# Patient Record
Sex: FEMALE | Race: WHITE | NOT HISPANIC OR LATINO | Employment: PART TIME | URBAN - METROPOLITAN AREA
[De-identification: names, ages, dates, MRNs, and addresses within clinical notes are randomized per-mention and may not be internally consistent; named-entity substitution may affect disease eponyms.]

---

## 2017-08-31 ENCOUNTER — HOSPITAL ENCOUNTER (OUTPATIENT)
Dept: RADIOLOGY | Facility: MEDICAL CENTER | Age: 32
End: 2017-08-31
Attending: FAMILY MEDICINE
Payer: MEDICAID

## 2017-08-31 DIAGNOSIS — M54.5 LOW BACK PAIN, UNSPECIFIED BACK PAIN LATERALITY, UNSPECIFIED CHRONICITY, WITH SCIATICA PRESENCE UNSPECIFIED: ICD-10-CM

## 2017-08-31 PROCEDURE — 72148 MRI LUMBAR SPINE W/O DYE: CPT

## 2017-10-26 ENCOUNTER — NON-PROVIDER VISIT (OUTPATIENT)
Dept: NEUROLOGY | Facility: MEDICAL CENTER | Age: 32
End: 2017-10-26
Payer: MEDICAID

## 2017-10-26 DIAGNOSIS — R20.2 PARESTHESIAS: ICD-10-CM

## 2017-10-26 PROCEDURE — 95909 NRV CNDJ TST 5-6 STUDIES: CPT | Performed by: PSYCHIATRY & NEUROLOGY

## 2017-10-26 PROCEDURE — 95886 MUSC TEST DONE W/N TEST COMP: CPT | Performed by: PSYCHIATRY & NEUROLOGY

## 2017-10-26 NOTE — PROGRESS NOTES
[facility]  Neurodiagnostic Laboratory  [address], [address]  [place] [address]  Tel: [phone]  Fax: [phone]  Test Date:  10/26/2017    Patient: ALISHA BRIZUELA : 1985 Physician: NICOLÁS   Sex: Female Height:  cm Ref Phys: MAXIMILIANO   ID#: [id number] Weight:  lbs. Technician:    CSN#: [id number]         CSN  [id number]        ___________________________  NICOLÁS        Nerve Conduction Studies  Anti Sensory Summary Table     Stim Site NR Peak (ms) Norm Peak (ms) P-T Amp (µV) Norm O-P Amp Site1 Site2 Delta-P (ms) Dist (cm) Yandel (m/s) Norm Yandel (m/s)   Right Sural Anti Sensory (Lat Mall)   Calf    4.0   >5 Calf Lat Mall 4.0 20.0 50 >40     Motor Summary Table     Stim Site NR Onset (ms) Norm Onset (ms) O-P Amp (mV) Norm O-P Amp Site1 Site2 Delta-0 (ms) Dist (cm) Yandel (m/s) Norm Yandel (m/s)   Right Peroneal EDB Motor (Ext Dig Brev)   Ankle    3.3 <5.5 5.5 >3.0 B Fib Ankle 8.3 39.0 47 >40   B Fib    11.6  4.0          Right Tibial Motor (Abd Mccormick Brev)   Ankle    3.2 <6 10.6 >8 Knee Ankle 9.8 40.0 41 >40   Knee    13.0  8.2            EMG+     Side Muscle Nerve Root Ins Act Fibs Psw Amp Dur Poly Recrt Int Pat Comment   Right VastusLat Femoral L2-4 Nml Nml Nml Nml Nml 0 Nml Nml    Right AntTibialis Dp Br Fibular L4-5 Nml Nml Nml Nml Nml 0 Nml Nml    Right Gastroc Tibial S1-2 Nml Nml Nml Nml Nml 0 Nml Nml    Right Ext Dig Brev Dp Br Fibular L5, S1 Nml Nml Nml Nml Nml 0 Nml Nml    Right AbdHallucis MedPlantar S1-2 Nml Nml Nml Nml Nml 0 Nml Nml        Nerve Conduction Studies  Anti Sensory Left/Right Comparison     Stim Site L Lat (ms) R Lat (ms) L-R Lat (ms) L Amp (µV) R Amp (µV) L-R Amp (%) Site1 Site2 L Yandel (m/s) R Yandel (m/s) L-R Yandel (m/s)   Sural Anti Sensory (Lat Mall)   Calf  4.0   14.1  Calf Lat Mall  50      Motor Left/Right Comparison     Stim Site L Lat (ms) R Lat (ms) L-R Lat (ms) L Amp (mV) R Amp (mV) L-R Amp (%) Site1 Site2 L Yandel (m/s) R Yandel (m/s) L-R Yandel (m/s)   Peroneal EDB  Motor (Ext Dig Brev)   Ankle  3.3   5.5  B Fib Ankle  47    B Fib  11.6   4.0         Tibial Motor (Abd Mccormick Brev)   Ankle  3.2   10.6  Knee Ankle  41    Knee  13.0   8.2               Waveforms:           DESCRIPTION:    Normal NCV EMG    SUMMARY:  ________________________________________________________________________      Normal NCV EMG    ________________________________________________________________________      However, recovering right peroneal nerve entrapment could have normal NCV EMG

## 2017-10-27 NOTE — PROCEDURES
DATE OF SERVICE:  10/26/2017    This is an outpatient NCV/EMG.    [facility]  Neurodiagnostic Laboratory  [address], [address]  [place] [address]  Tel: [phone]  Fax: [phone]  Test Date:  10/26/2017     Patient: ALISHA BRIZUELA : 1985 Physician: NICOLÁS   Sex: Female Height:  cm Ref Phys: MAXIMILIANO   ID#: [id number] Weight:  lbs. 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             ___________________________  NICOLÁS          Nerve Conduction Studies  Anti Sensory Summary Table      Stim Site NR Peak (ms) Norm Peak (ms) P-T Amp (µV) Norm O-P Amp Site1 Site2 Delta-P (ms) Dist (cm) Yandel (m/s) Norm Yandel (m/s)   Right Sural Anti Sensory (Lat Mall)   Calf    4.0     >5 Calf Lat Mall 4.0 20.0 50 >40      Motor Summary Table      Stim Site NR Onset (ms) Norm Onset (ms) O-P Amp (mV) Norm O-P Amp Site1 Site2 Delta-0 (ms) Dist (cm) Yandel (m/s) Norm Yandel (m/s)   Right Peroneal EDB Motor (Ext Dig Brev)   Ankle    3.3 <5.5 5.5 >3.0 B Fib Ankle 8.3 39.0 47 >40   B Fib    11.6   4.0                 Right Tibial Motor (Abd Mccormick Brev)   Ankle    3.2 <6 10.6 >8 Knee Ankle 9.8 40.0 41 >40   Knee    13.0   8.2                    EMG+      Side Muscle Nerve Root Ins Act Fibs Psw Amp Dur Poly Recrt Int Pat Comment   Right VastusLat Femoral L2-4 Nml Nml Nml Nml Nml 0 Nml Nml     Right AntTibialis Dp Br Fibular L4-5 Nml Nml Nml Nml Nml 0 Nml Nml     Right Gastroc Tibial S1-2 Nml Nml Nml Nml Nml 0 Nml Nml     Right Ext Dig Brev Dp Br Fibular L5, S1 Nml Nml Nml Nml Nml 0 Nml Nml     Right AbdHallucis MedPlantar S1-2 Nml Nml Nml Nml Nml 0 Nml Nml           Nerve Conduction Studies  Anti Sensory Left/Right Comparison      Stim Site L Lat (ms) R Lat (ms) L-R Lat (ms) L Amp (µV) R Amp (µV) L-R Amp (%) Site1 Site2 L Yandel (m/s) R Yandel (m/s) L-R Yandel (m/s)   Sural Anti Sensory (Lat Mall)   Calf   4.0     14.1   Calf Lat Mall   50         Motor Left/Right Comparison      Stim Site L Lat (ms) R Lat (ms) L-R Lat (ms) L Amp (mV) R Amp (mV) L-R Amp (%) Site1 Site2 L Yandel (m/s) R Yandel (m/s) L-R Yandel (m/s)   Peroneal EDB Motor (Ext Dig Brev)   Ankle   3.3     5.5   B Fib Ankle   47     B Fib   11.6     4.0               Tibial Motor (Abd Mccormick Brev)   Ankle   3.2     10.6   Knee Ankle   41     Knee   13.0     8.2                       Waveforms:             DESCRIPTION:     Normal NCV EMG     SUMMARY:  ________________________________________________________________________        Normal NCV EMG     ________________________________________________________________________        However, recovering right peroneal nerve entrapment could have normal NCV EMG    Nerve conduction studies and EMG studies are useful supportive diagnostic tool but not confirmatory tests in majority of cases. Clinical Correlation is advised       ____________________________________     MD TOMY COREA / KISHA    DD:  10/26/2017 16:10:19  DT:  10/26/2017 16:22:05    D#:  [id number]  Job#:  [id number]    cc: Delmy Tiwari MD

## 2019-02-27 ENCOUNTER — HOSPITAL ENCOUNTER (OUTPATIENT)
Dept: RADIOLOGY | Facility: MEDICAL CENTER | Age: 34
End: 2019-02-27
Attending: FAMILY MEDICINE
Payer: COMMERCIAL

## 2019-02-27 DIAGNOSIS — M25.561 ACUTE PAIN OF RIGHT KNEE: ICD-10-CM

## 2019-02-27 PROCEDURE — 73721 MRI JNT OF LWR EXTRE W/O DYE: CPT | Mod: RT

## 2019-06-07 ENCOUNTER — PHYSICAL THERAPY (OUTPATIENT)
Dept: PHYSICAL THERAPY | Facility: REHABILITATION | Age: 34
End: 2019-06-07
Attending: ORTHOPAEDIC SURGERY
Payer: COMMERCIAL

## 2019-06-07 DIAGNOSIS — M25.561 ACUTE PAIN OF RIGHT KNEE: ICD-10-CM

## 2019-06-07 DIAGNOSIS — M22.41 CHONDROMALACIA OF PATELLOFEMORAL JOINT, RIGHT: ICD-10-CM

## 2019-06-07 PROCEDURE — 97110 THERAPEUTIC EXERCISES: CPT

## 2019-06-07 PROCEDURE — 97162 PT EVAL MOD COMPLEX 30 MIN: CPT

## 2019-06-07 ASSESSMENT — ENCOUNTER SYMPTOMS
EXACERBATED BY: ACTIVITY
ALLEVIATING FACTORS: PHARMACOTHERAPY
ALLEVIATING FACTORS: ACTIVITY MODIFICATION
QUALITY: TIGHT
QUALITY: DISCOMFORT

## 2019-06-07 NOTE — OP THERAPY EVALUATION
Outpatient Physical Therapy  INITIAL EVALUATION    [facility] Physical Therapy [address]  [address]  [place] [address]  Phone:  [phone]  Fax:  [phone]    Date of Evaluation: 06/07/2019    Patient: Vanessa Crenshaw  YOB: 1985  MRN: [id number]     Referring Provider: WALT [id number]  [address]  [place] [address]   Referring Diagnosis Pain in right knee [M25.561]     Time Calculation  Start time: 1330  Stop time: 1430 Time Calculation (min): 60 minutes     Physical Therapy Occurrence Codes    Date of onset of impairment:  2/15/19   Date physical therapy care plan established or reviewed:  6/7/19   Date physical therapy treatment started:  6/7/19          Chief Complaint: Knee Problem    Visit Diagnoses     ICD-10-CM   1. Chondromalacia of patellofemoral joint, right M22.41   2. Acute pain of right knee M25.561         Subjective:   History of Present Illness:     Date of onset:  2/15/2019    Mechanism of injury:  February, wore ski boots for a full day at work.  Was having sharp pains.  No longer having sharp pains, but feels very tight.    Was doing gym workouts 5x/week- jogging/running and weight machines.  Stopped for a few months.  Recently returned to gym 1x/week doing only walking and incline walking on TM without much symptoms.  Tried 2x this week, and caused discomfort and tightness the next day.  Feels tight.  Flat shoes cause distal medial and lateral thigh pain.  , standing 8 hour shifts.  Wears sneakers  Pain:     Quality:  Tight and discomfort    Pain timing: after activity.    Relieving factors:  Pharmacotherapy and activity modification (ibuprofen 600 mg prn (next morning after agg activity))    Aggravating factors:  Activity (walking downhill, standing from sitting)  Treatments:     None        History reviewed. No pertinent past medical history.  History reviewed. No pertinent surgical history.  Social History   Substance Use  "Topics   • Smoking status: Current Every Day Smoker     Packs/day: 3.00     Types: Cigarettes   • Smokeless tobacco: Not on file   • Alcohol use Yes     Family and Occupational History     Social History   • Marital status: Single     Spouse name: N/A   • Number of children: N/A   • Years of education: N/A       Objective     Tenderness     Right Knee   Tenderness in the inferior patella.     Active Range of Motion     Right Knee   Flexion: WFL  Extension: 0 degrees     Passive Range of Motion     Additional Passive Range of Motion Details  Supine passive hamstring stretch- L hip flexion to 75 deg, R hip flexion to 60 degrees.  Prone passive quad stretch knee flexion- equal R and L without symptoms.    Patellar Mobility   Left Knee Patellar tendons within functional limits include the superior and inferior. Hypermobile in the left medial and left lateral patellar tendon(s).     Right Knee Patellar tendons within functional limits include the lateral, superior and inferior. Hypermobile in the medial patellar tendon(s).     Strength:      Left Knee   Normal strength    Right Knee   Flexion: 4+  Extension: 5    Additional Strength Details  R SLR- fatigues with reps  MMTs:  R hip abd= 4+/5  R hip ext= 4+/5  R hip IR and ER = 4+/5      Tests     Right Knee   Positive patella-femoral grind.   Negative lateral Mitch, medial Mitch, patellar apprehension, patellar compression, valgus stress test at 0 degrees and varus stress test at 0 degrees.   Ambulation     Observational Gait   Gait: asymmetric     Quality of Movement During Gait     Knee    Knee (Right): Positive IR tibial torsion.     Functional Assessment   Squat   Sitting toward left side.     Forward Step Up 6\"     Right Leg  Pain and increased contralateral push off.     Forward Step Down 6\"     Right Leg  Pain and valgus.     Single Leg Stance   Left: 30 seconds  Right: 30 (increased knee and hip strategy with R SLS versus ankle strategy only with L SLS) " seconds        Therapeutic Exercises (CPT 78561):     1. Wall squats, emphasis on RLE alignment and equal weight bearing R and LLE.    2. SLR    3. Sidelying hip abd    4. Bridges    5. SL bridges    6. Active HS stretch      Therapeutic Exercise Summary: Provided handout with these exercises for daily HEP.      Time-based treatments/modalities:  Therapeutic exercise minutes (CPT 07136): 15 minutes       Assessment, Response and Plan:   Impairments: activity intolerance, impaired physical strength, lacks appropriate home exercise program, pain with function and weight-bearing intolerance    Assessment details:  33 y.o. female presents with R patellar chondromalacia.  Pt demonstrates decreased strength and weight acceptance on R LE.  Pt will benefit from skilled PT services for strengthening and HEP to improve patellar mobility and positioning in weight bearing activities.  Barriers to therapy:  None  Prognosis: good    Goals:   Short Term Goals:   1. Pt able to return to gym activity 3x/week with < 3/10 symptom intensity.  2. R passive hamstring flexibility equal to L.  3. Pt able to stand from sitting with < 3/10 symptom intensity.  4. Pt will be independent with daily HEP.  Short term goal time span:  2-4 weeks      Long Term Goals:    1. R hip MMTs= 5/5 throughout.  2. R step ups and downs performed without symptoms and with good stability and alignment.  3. Pt will report increased function via improved scores on WOMAC.  Long term goal time span:  6-8 weeks    Plan:   Therapy options:  Physical therapy treatment to continue  Planned therapy interventions:  E Stim Unattended (CPT 75348), Manual Therapy (CPT 76241), Neuromuscular Re-education (CPT 35769) and Therapeutic Exercise (CPT 09172)  Frequency:  2x week  Duration in visits:  8  Discussed with:  Patient  Plan details:  Expect decrease in frequency of visits as pt progresses.      Functional Limitations and Severity Modifiers  WOMAC Grand Total: 25                Referring provider co-signature:  I have reviewed this plan of care and my co-signature certifies the need for services.  Certification Dates:   From 6/7/19     To 8/1/19    Physician Signature: ________________________________ Date: ______________

## 2019-06-14 ENCOUNTER — PHYSICAL THERAPY (OUTPATIENT)
Dept: PHYSICAL THERAPY | Facility: REHABILITATION | Age: 34
End: 2019-06-14
Attending: ORTHOPAEDIC SURGERY
Payer: COMMERCIAL

## 2019-06-14 DIAGNOSIS — M25.561 ACUTE PAIN OF RIGHT KNEE: ICD-10-CM

## 2019-06-14 DIAGNOSIS — M22.41 CHONDROMALACIA OF PATELLOFEMORAL JOINT, RIGHT: ICD-10-CM

## 2019-06-14 PROCEDURE — 97110 THERAPEUTIC EXERCISES: CPT

## 2019-06-14 NOTE — OP THERAPY DAILY TREATMENT
"  Outpatient Physical Therapy  DAILY TREATMENT     [facility] Physical [address]  [address]  [place] [address]  Phone:  [phone]  Fax:  [phone]    Date: 06/14/2019    Patient: Vanessa Crenshaw  YOB: 1985  MRN: [id number]     Time Calculation  Start time: 0832  Stop time: 0857 Time Calculation (min): 25 minutes     Chief Complaint: Knee Problem    Visit #: 2    SUBJECTIVE:  I've been to the gym 2x since eval.  I'm not \"babying\" my knee, I did leg press and hip abd and add weight machines.  I took ibuprofen the following day.  I'm doing the HEP, but not sure if I'm doing a couple right, because I don't feel anything.    OBJECTIVE:          Therapeutic Exercises (CPT 19991):     1. TKE, x15B with medium resistance band    2. Active hamstring stretch at 90/90, x10B, reviewed and corrected for correct technique for HEP    3. SLR with ER, x15B    4. Prone hip ext, x15B    5. Shuttle leg press, 6 cords, x20    6. Long sitting HS stretch, x30 sec B    7. Sidelying hip abd, x10B, reviewed and corrected for correct technique for HEP    8. Standing ITB stretch, x30 sec B    9. Supine ITB stretch, x30 sec B    10. Figure four stretch pulling knee to chest, x30 sec B      Therapeutic Exercise Summary: Add seated HS stretch, ITB stretches, and figure four stretch to HEP.      Time-based treatments/modalities:  Therapeutic exercise minutes (CPT 15633): 25 minutes     ASSESSMENT:   Response to treatment: Pt cooperative and eager to continue progressing exercises independently.    PLAN/RECOMMENDATIONS:   Plan for treatment: therapy treatment to continue next visit.  Planned interventions for next visit: continue with current treatment.      "

## 2019-06-17 ENCOUNTER — PHYSICAL THERAPY (OUTPATIENT)
Dept: PHYSICAL THERAPY | Facility: REHABILITATION | Age: 34
End: 2019-06-17
Attending: ORTHOPAEDIC SURGERY
Payer: COMMERCIAL

## 2019-06-17 DIAGNOSIS — M22.41 CHONDROMALACIA OF PATELLOFEMORAL JOINT, RIGHT: ICD-10-CM

## 2019-06-17 DIAGNOSIS — M25.561 ACUTE PAIN OF RIGHT KNEE: ICD-10-CM

## 2019-06-17 PROCEDURE — 97110 THERAPEUTIC EXERCISES: CPT

## 2019-06-17 NOTE — OP THERAPY DAILY TREATMENT
Outpatient Physical Therapy  DAILY TREATMENT     [facility] Physical [address].  [address]  [place] [address]  Phone:  [phone]  Fax:  [phone]    Date: 06/17/2019    Patient: Vanessa Crenshaw  YOB: 1985  MRN: [id number]     Time Calculation  Start time: 0830  Stop time: 0900 Time Calculation (min): 30 minutes     Chief Complaint: Knee Problem    Visit #: 3    SUBJECTIVE:  Wearing sandals caused some soreness.  Went to gym, did more minutes on incline on treadmill, then did elliptical.  Stretched after gym workout, which seemed to help.    OBJECTIVE:        Therapeutic Exercises (CPT 33278):     1. Standing TKE with ball smash, x10 x5sec R    2. Active hamstring stretch at 90/90, x10B    4. Standing hip ext weighted machine, x15B    5. Shuttle leg press with band for hip abd, 5 cords, x20    6. Single leg leg press, 4 cords, x15B    7. Ball squats, x10    8. Tandem stance on 1/2 FR, x30sec B    9. Airex heel raises and toe raises, x10    10. Figure four stretch pulling knee to chest, x30 sec B      Time-based treatments/modalities:  Therapeutic exercise minutes (CPT 06299): 30 minutes       ASSESSMENT:   Response to treatment: Improving symptoms.    PLAN/RECOMMENDATIONS:   Plan for treatment: therapy treatment to continue next visit.  Planned interventions for next visit: continue with current treatment. Add dynamic balance activities and SL balance activities as tolerated.

## 2019-06-21 ENCOUNTER — PHYSICAL THERAPY (OUTPATIENT)
Dept: PHYSICAL THERAPY | Facility: REHABILITATION | Age: 34
End: 2019-06-21
Attending: ORTHOPAEDIC SURGERY
Payer: COMMERCIAL

## 2019-06-21 DIAGNOSIS — M22.41 CHONDROMALACIA OF PATELLOFEMORAL JOINT, RIGHT: ICD-10-CM

## 2019-06-21 DIAGNOSIS — M25.561 ACUTE PAIN OF RIGHT KNEE: ICD-10-CM

## 2019-06-21 PROCEDURE — 97110 THERAPEUTIC EXERCISES: CPT

## 2019-06-21 NOTE — OP THERAPY DAILY TREATMENT
Outpatient Physical Therapy  DAILY TREATMENT     [facility] Physical [address]  [address]  [place] [address]  Phone:  [phone]  Fax:  [phone]    Date: 06/21/2019    Patient: Vanessa Crenshaw  YOB: 1985  MRN: [id number]     Time Calculation  Start time: 0830  Stop time: 0900 Time Calculation (min): 30 minutes     Chief Complaint: Knee Problem    Visit #: 4    SUBJECTIVE:  Gym 3 x this week.  A little sore in distal R quad, I think from leg press.  Did 50# 4 sets of 10-15.  Pain with sit to stand only if later in day and already tired or from low to floor.    OBJECTIVE:  Current objective measures: MMTs: R knee ext=5, R knee flex=5-/5, R hip flex= 5-/5, R hip ext=5-/5, R hip abd=5-/5, R hip add=5-/5  R SLS= 30 sec with increased sway and hip streaky versus L SLS R hamstring length          Therapeutic Exercises (CPT 21925):     1. Standing TKE with ball smash, x10 x5sec R    2. Rebounder in stride on Airex, x10B with 4kg med ball    3. Upright bike, resistance 2.3, x5 min    4. Standing hip ext weighted machine 25#, x15B    5. Rebounder in SLS, x10B with 4kg med ball    6. Ball bridges, x10, x15 sec holds x4    7. Ball squats, x10    8. Tandem stance on 1/2 FR, x30sec B    9. Airex heel raises and toe raises, x15    11. SLR, x15R, mild fatigue at end of set of 15.      Therapeutic Exercise Summary: Add TKE ball smash, ball bridges, ball squats, SLS and tandem balance to HEP.      Time-based treatments/modalities:  Therapeutic exercise minutes (CPT 93651): 30 minutes       ASSESSMENT:   Response to treatment: Improving strength and symptoms.    PLAN/RECOMMENDATIONS:   Plan for treatment: therapy treatment to continue next visit. Decrease frequency to 1x/week.  Planned interventions for next visit: continue with current treatment.  EMMANUEL clock lunge, EMMANUEL dynadisc activity.

## 2019-06-24 ENCOUNTER — APPOINTMENT (OUTPATIENT)
Dept: PHYSICAL THERAPY | Facility: REHABILITATION | Age: 34
End: 2019-06-24
Attending: ORTHOPAEDIC SURGERY
Payer: COMMERCIAL

## 2019-06-28 ENCOUNTER — APPOINTMENT (OUTPATIENT)
Dept: PHYSICAL THERAPY | Facility: REHABILITATION | Age: 34
End: 2019-06-28
Attending: ORTHOPAEDIC SURGERY
Payer: COMMERCIAL

## 2020-03-02 ENCOUNTER — OFFICE VISIT (OUTPATIENT)
Dept: URGENT CARE | Facility: CLINIC | Age: 35
End: 2020-03-02
Payer: COMMERCIAL

## 2020-03-02 VITALS
OXYGEN SATURATION: 99 % | TEMPERATURE: 98.3 F | SYSTOLIC BLOOD PRESSURE: 116 MMHG | RESPIRATION RATE: 16 BRPM | HEIGHT: 68 IN | DIASTOLIC BLOOD PRESSURE: 72 MMHG | HEART RATE: 66 BPM | WEIGHT: 189 LBS | BODY MASS INDEX: 28.64 KG/M2

## 2020-03-02 DIAGNOSIS — R07.0 THROAT DISCOMFORT: ICD-10-CM

## 2020-03-02 DIAGNOSIS — R68.83 CHILLS (WITHOUT FEVER): ICD-10-CM

## 2020-03-02 DIAGNOSIS — R29.898 WEAKNESS OF LIMB: ICD-10-CM

## 2020-03-02 LAB
APPEARANCE UR: CLEAR
BILIRUB UR STRIP-MCNC: NEGATIVE MG/DL
COLOR UR AUTO: YELLOW
GLUCOSE UR STRIP.AUTO-MCNC: NEGATIVE MG/DL
INT CON NEG: NEGATIVE
INT CON POS: POSITIVE
KETONES UR STRIP.AUTO-MCNC: NEGATIVE MG/DL
LEUKOCYTE ESTERASE UR QL STRIP.AUTO: NEGATIVE
NITRITE UR QL STRIP.AUTO: NEGATIVE
PH UR STRIP.AUTO: 7 [PH] (ref 5–8)
PROT UR QL STRIP: NEGATIVE MG/DL
RBC UR QL AUTO: NEGATIVE
S PYO AG THROAT QL: NEGATIVE
SP GR UR STRIP.AUTO: 1.01
UROBILINOGEN UR STRIP-MCNC: 0.2 MG/DL

## 2020-03-02 PROCEDURE — 81002 URINALYSIS NONAUTO W/O SCOPE: CPT | Performed by: EMERGENCY MEDICINE

## 2020-03-02 PROCEDURE — 87880 STREP A ASSAY W/OPTIC: CPT | Performed by: EMERGENCY MEDICINE

## 2020-03-02 PROCEDURE — 99203 OFFICE O/P NEW LOW 30 MIN: CPT | Performed by: EMERGENCY MEDICINE

## 2020-03-02 ASSESSMENT — ENCOUNTER SYMPTOMS
CHANGE IN BOWEL HABIT: 0
WEAKNESS: 1
COUGH: 0
SHORTNESS OF BREATH: 0
SORE THROAT: 1
LOSS OF CONSCIOUSNESS: 0
ROS GI COMMENTS: SOME DECREASED APPETITE.
DIARRHEA: 0
ABDOMINAL PAIN: 0
HEADACHES: 0
FLANK PAIN: 0
FEVER: 0
VOMITING: 0
PALPITATIONS: 0

## 2020-03-02 NOTE — LETTER
March 2, 2020       Patient: Vanessa Crenshaw   YOB: 1985   Date of Visit: 3/2/2020         To Whom It May Concern:    It is my medical opinion that Vanessa Crenshaw was not able to attend full workday due to medical reasons.      Sincerely,          Kwasi Gardner M.D.  Electronically Signed

## 2020-03-03 NOTE — PROGRESS NOTES
Subjective:      Vanessa Crenshaw is a 34 y.o. female who presents with Weakness ( x today, weakness in the knees, dizziness, chills, headache)            Other   This is a new problem. The current episode started today. The problem has been gradually improving. Associated symptoms include a sore throat and weakness. Pertinent negatives include no abdominal pain, change in bowel habit, chest pain, congestion, coughing, fever, headaches, rash, urinary symptoms or vomiting. Nothing aggravates the symptoms. She has tried nothing for the symptoms. The treatment provided significant relief.   Patient notes onset of leg and hand sensation of weakness, subsequently some lightheadedness, chills.  Then noted throat tightness when swallowing food.  Associated with work anxiety; denies similar prior events.    Review of Systems   Constitutional: Negative for fever.   HENT: Positive for sore throat. Negative for congestion and nosebleeds.    Respiratory: Negative for cough and shortness of breath.    Cardiovascular: Negative for chest pain and palpitations.   Gastrointestinal: Negative for abdominal pain, change in bowel habit, diarrhea and vomiting.        Some decreased appetite.   Genitourinary: Negative for dysuria, flank pain, frequency, hematuria and urgency.        No vaginal discharge or bleeding. Denies pregnancy.   Skin: Negative for rash.   Neurological: Positive for weakness. Negative for loss of consciousness and headaches.     History reviewed. No pertinent past medical history.  History reviewed. No pertinent surgical history.   Allergy:  Patient has no known allergies.     Current Outpatient Medications:   •  bacitracin-polymyxin b, Apply to affected area bid, Not Taking  •  oxyCODONE-acetaminophen, 1-2 Tab, Oral, Q4HRS PRN (Patient not taking: Reported on 3/2/2020), Not Taking  •  ondansetron, 4 mg, Oral, Q8HRS PRN (Patient not taking: Reported on 3/2/2020), Not Taking   family history is not on file.  "  Social History     Tobacco Use   • Smoking status: Current Every Day Smoker     Packs/day: 3.00     Types: Cigarettes   • Smokeless tobacco: Never Used   Substance Use Topics   • Alcohol use: Yes   • Drug use: Not Currently     Types: Intravenous     Comment: meth         Objective:     /72   Pulse 66   Temp 36.8 °C (98.3 °F) (Temporal)   Resp 16   Ht 1.727 m (5' 8\")   Wt 85.7 kg (189 lb)   SpO2 99%   BMI 28.74 kg/m²      Physical Exam  Constitutional:       General: She is not in acute distress.     Appearance: She is well-developed. She is not ill-appearing.   HENT:      Head: Normocephalic.      Right Ear: Hearing and external ear normal.      Left Ear: Hearing and external ear normal.      Nose: No rhinorrhea.      Mouth/Throat:      Mouth: Mucous membranes are moist.      Pharynx: Oropharynx is clear. No uvula swelling.   Eyes:      General: Lids are normal.      Conjunctiva/sclera: Conjunctivae normal.   Neck:      Musculoskeletal: Neck supple.      Thyroid: No thyromegaly.      Trachea: Phonation normal.   Cardiovascular:      Rate and Rhythm: Normal rate and regular rhythm.  No extrasystoles are present.     Heart sounds: Normal heart sounds. No murmur.   Pulmonary:      Effort: Pulmonary effort is normal.      Breath sounds: Normal breath sounds.   Abdominal:      General: There is no distension.      Palpations: Abdomen is soft. There is no mass.      Tenderness: There is no abdominal tenderness.   Skin:     General: Skin is warm and dry.   Neurological:      Mental Status: She is alert and oriented to person, place, and time.      Motor: No tremor.      Gait: Gait is intact.   Psychiatric:         Mood and Affect: Mood normal.         Speech: Speech normal.         Behavior: Behavior is cooperative.         Thought Content: Thought content normal.            Advised to be reevaluated if symptoms are recurrent or worsening     Assessment/Plan:     1. Weakness of limb  Resolved  2. Chills " (without fever)  normal- POCT Urinalysis  3. Throat discomfort  negative- POCT Rapid Strep A

## 2020-04-03 ENCOUNTER — TELEPHONE (OUTPATIENT)
Dept: PHYSICAL THERAPY | Facility: REHABILITATION | Age: 35
End: 2020-04-03

## 2020-04-03 NOTE — OP THERAPY DISCHARGE SUMMARY
Outpatient Physical Therapy  DISCHARGE SUMMARY NOTE      [facility] Therapy [address]  [address]  [place] [address]  Phone:  [phone]  Fax:  [phone]    Date of Visit: 04/03/2020    Patient: Vanessa Crenshaw  YOB: 1985  MRN: [id number]     Referring Provider: Erik Degroot M.D.  [address] [place] [address] [address]  [address]  [place] [address]   Referring Diagnosis Pain in right knee [M25.561]     Physical Therapy Occurrence Codes    Date of onset of impairment:  2/15/19   Date physical therapy care plan established or reviewed:  6/7/19   Date physical therapy treatment started:  6/7/19              Your patient is being discharged from Physical Therapy with the following comments:   · Goals met      Sarahi Majano, PT    Date: 4/3/2020

## 2020-05-12 ENCOUNTER — HOSPITAL ENCOUNTER (EMERGENCY)
Facility: MEDICAL CENTER | Age: 35
End: 2020-05-12
Attending: EMERGENCY MEDICINE
Payer: COMMERCIAL

## 2020-05-12 VITALS
SYSTOLIC BLOOD PRESSURE: 127 MMHG | RESPIRATION RATE: 18 BRPM | DIASTOLIC BLOOD PRESSURE: 84 MMHG | TEMPERATURE: 97.1 F | HEART RATE: 69 BPM | WEIGHT: 191.8 LBS | OXYGEN SATURATION: 96 % | BODY MASS INDEX: 29.07 KG/M2 | HEIGHT: 68 IN

## 2020-05-12 DIAGNOSIS — R11.0 NAUSEA: ICD-10-CM

## 2020-05-12 LAB
APPEARANCE UR: CLEAR
BACTERIA #/AREA URNS HPF: NEGATIVE /HPF
BILIRUB UR QL STRIP.AUTO: NEGATIVE
COLOR UR: YELLOW
EPI CELLS #/AREA URNS HPF: NEGATIVE /HPF
GLUCOSE UR STRIP.AUTO-MCNC: NEGATIVE MG/DL
HCG UR QL: NEGATIVE
HYALINE CASTS #/AREA URNS LPF: NORMAL /LPF
KETONES UR STRIP.AUTO-MCNC: ABNORMAL MG/DL
LEUKOCYTE ESTERASE UR QL STRIP.AUTO: NEGATIVE
MICRO URNS: ABNORMAL
NITRITE UR QL STRIP.AUTO: NEGATIVE
PH UR STRIP.AUTO: 5.5 [PH] (ref 5–8)
PROT UR QL STRIP: NEGATIVE MG/DL
RBC # URNS HPF: NORMAL /HPF
RBC UR QL AUTO: ABNORMAL
SP GR UR STRIP.AUTO: 1.01
UROBILINOGEN UR STRIP.AUTO-MCNC: 0.2 MG/DL
WBC #/AREA URNS HPF: NORMAL /HPF

## 2020-05-12 PROCEDURE — 81001 URINALYSIS AUTO W/SCOPE: CPT

## 2020-05-12 PROCEDURE — 99284 EMERGENCY DEPT VISIT MOD MDM: CPT

## 2020-05-12 PROCEDURE — 81025 URINE PREGNANCY TEST: CPT

## 2020-05-12 RX ORDER — ONDANSETRON 4 MG/1
4 TABLET, ORALLY DISINTEGRATING ORAL EVERY 8 HOURS PRN
Qty: 10 TAB | Refills: 0 | Status: SHIPPED | OUTPATIENT
Start: 2020-05-12 | End: 2023-12-25

## 2020-05-13 NOTE — ED PROVIDER NOTES
ED Provider Note    CHIEF COMPLAINT  Chief Complaint   Patient presents with   • Blood in Urine     One week ago, recently diagnosed with a UTI, prescriptions for phenazopyridine and nitrofurantoin   • Urinary Frequency     Symptoms resolved after taking medication   • Bloated     Feeling of fullness when bending forward   • Nausea     For one day   • Abdominal Pain     Right lower quadrant pain for one day       HPI  Vanessa Crenshaw is a 34 y.o. female who presents with a tingling sensation and bloating sensation in her right lower quadrant without abdominal pain.  A week ago she had urgency hesitancy and was clinically diagnosed with a UTI and treated with Macrobid.  She has 2 doses remaining.  She was treated on the seventh.  She doubts a urine culture was done.  She is had no fever, flank pain, abdominal pain.  Today she developed some nausea.  No diabetes or immune compromise.  No prior UTI or kidney stone.    REVIEW OF SYSTEMS  Pertinent positives include: Abdominal tingling, nausea, bloating, possible recent UTI, early menstrual..  Pertinent negatives include: Fever, cough, shortness of breath.    PAST MEDICAL HISTORY  Denies including appendicitis UTI or kidney stone    SOCIAL HISTORY  Social History     Tobacco Use   • Smoking status: Current Every Day Smoker     Packs/day: 3.00     Types: Cigarettes   • Smokeless tobacco: Never Used   Substance Use Topics   • Alcohol use: Yes   • Drug use: Not Currently     Types: Intravenous     Comment: meth     .    CURRENT MEDICATIONS  Home Medications     Reviewed by Caesar Henao R.N. (Registered Nurse) on 05/12/20 at 2042  Med List Status: Partial   Medication Last Dose Status   bacitracin-polymyxin b (POLYSPORIN) 500-92938 UNIT/GM OINT  Active   ondansetron (ZOFRAN) 4 MG TABS  Active   oxycodone-acetaminophen (PERCOCET) 5-325 MG TABS  Active                ALLERGIES  No Known Allergies    PHYSICAL EXAM  VITAL SIGNS: BP (!) 173/95   Pulse 95   Temp  "36.2 °C (97.1 °F) (Oral)   Resp 18   Ht 1.727 m (5' 8\")   Wt 87 kg (191 lb 12.8 oz)   SpO2 99%   BMI 29.16 kg/m² Hypertensive, afebrile  Constitutional :  Well developed, Well nourished, well-appearing.   HNT: Atraumatic.   Eyes: pupils reactive without eye discharge nor conjunctival hyperemia.  Cardiovascular: Regular rhythm, No murmurs, No rubs, No gallops.  No cyanosis.   Respiratory: No rales, rhonchi, wheeze  Abdomen:  Soft, nontender including the right lower quadrant, no distention, no CVA tenderness  Skin: Warm, dry, no erythema, no rash.   Musculoskeletal: no limb deformities.    LABORATORY:  Results for orders placed or performed during the hospital encounter of 05/12/20   URINALYSIS,CULTURE IF INDICATED   Result Value Ref Range    Color Yellow     Character Clear     Specific Gravity 1.007 <1.035    Ph 5.5 5.0 - 8.0    Glucose Negative Negative mg/dL    Ketones Trace (A) Negative mg/dL    Protein Negative Negative mg/dL    Bilirubin Negative Negative    Urobilinogen, Urine 0.2 Negative    Nitrite Negative Negative    Leukocyte Esterase Negative Negative    Occult Blood Trace (A) Negative    Micro Urine Req Microscopic    BETA-HCG QUALITATIVE URINE   Result Value Ref Range    Beta-Hcg Urine Negative Negative   URINE MICROSCOPIC (W/UA)   Result Value Ref Range    WBC 0-2 /hpf    RBC 0-2 /hpf    Bacteria Negative None /hpf    Epithelial Cells Negative /hpf    Hyaline Cast 0-2 /lpf         COURSE & MEDICAL DECISION MAKING  Well-appearing patient presents with tingling in her right abdomen and a sensation of bloating without pain or fever.  Her UTI appears to be resolving on Macrobid.  At this point laboratory evaluation and imaging considered but unlikely to .  This is unlikely to be early appendicitis or bowel obstruction.    This patient has borderline or elevated blood pressure as recorded above and was instructed to followup with primary physician for comprehensive blood pressure " evaluation and yearly fasting cholesterol assessment according to to CMS protocol.    PLAN:  Finish antibiotics  Return for uncontrolled vomiting, dizziness, severe abdominal pain, ill appearance    your doctor if not better 2 days          CONDITION:  Good.    FINAL IMPRESSION:  1. Nausea          Electronically signed by: Gavin Coreas M.D., 5/12/2020

## 2020-05-13 NOTE — ED NOTES
Pt verbalizes understanding of discharge and follow-up instructions. Given Rx Xo.  VSS.  All questions answered.  Ambulates to discharge with steady gait.

## 2020-05-13 NOTE — DISCHARGE INSTRUCTIONS
Initially antibiotic.  Take Zofran for nausea.  See your doctor if not better in 2 to 3 days.  Return for ill appearance, control vomiting, abdominal pain, GI bleed or other concerning symptoms.  None of these are anticipated.    You had a borderline or high normal blood pressure reading today.  This does not necessarily mean you have hypertension.  Please followup with your/a primary physician for comprehensive blood pressure evaluation and yearly fasting cholesterol assessment.  BP Readings from Last 3 Encounters:   05/12/20 146/93   03/02/20 116/72   11/08/12 115/75

## 2020-05-13 NOTE — ED TRIAGE NOTES
Chief Complaint   Patient presents with   • Blood in Urine     One week ago, recently diagnosed with a UTI, prescriptions for phenazopyridine and nitrofurantoin   • Urinary Frequency     Symptoms resolved after taking medication   • Bloated     Feeling of fullness when bending forward   • Nausea     For one day   • Abdominal Pain     Right lower quadrant pain for one day

## 2020-05-13 NOTE — ED NOTES
Assist RN: Pt resting comfortably in bed. Respirations even and unlabored. Pt denies needs at this time. Urine sent to lab. Call light within reach.

## 2020-11-17 ENCOUNTER — HOSPITAL ENCOUNTER (OUTPATIENT)
Dept: RADIOLOGY | Facility: MEDICAL CENTER | Age: 35
End: 2020-11-17
Attending: PHYSICIAN ASSISTANT
Payer: COMMERCIAL

## 2020-11-17 DIAGNOSIS — R10.31 RLQ ABDOMINAL PAIN: ICD-10-CM

## 2020-11-17 PROCEDURE — 76830 TRANSVAGINAL US NON-OB: CPT

## 2021-04-08 NOTE — H&P
DATE OF ADMISSION:  2021     HISTORY OF PRESENT ILLNESS:  The patient is a 35-year-old  0, para 0   who was seen for abnormal uterine bleeding, had an ultrasound suggestive of   small fibroid impinging into the endometrial cavity and thick lining.  The   patient scheduled for hysteroscopy, dilatation and curettage, and MyoSure   removal of the fibroid polyp.  Here for preop appointment.  History of heavy   periods and painful periods for a few years now, is sexually active, has a 60   years old partner and uses withdrawal method techniques for contraception, is   not interested in family.     MEDICAL HISTORY:  Denies high blood pressure, diabetes or thyroid issues.     OBSTETRIC HISTORY:   0, para 0.     SOCIAL HISTORY:  Current everyday smoker, smokes about five cigarettes a day.     SURGICAL HISTORY:  No prior surgeries.     FAMILY HISTORY:  Nothing contributory.     ALLERGIES:  No known drug allergies.     REVIEW OF SYSTEMS:  The patient is alert and oriented.  Denies shortness of   breath, chest pain or palpitation.  Regular bowel and bladder habits.     PHYSICAL EXAMINATION:  VITAL SIGNS:  The patient is 5 feet 8 inches tall and weighs 200 pounds.    Vital signs are stable.  See EMR for current vitals.  GENERAL:  The patient is awake, alert, well developed, well nourished, well   groomed.  RESPIRATIONS:  The patient is relaxed, breathes without effort.  LUNGS:  Clear to auscultate, expands symmetrically.  CARDIOVASCULAR:  Rate is normal, rhythm is regular.  S1, S2 normal.  No   murmurs, gallops or rubs.  PELVIC:  Deferred.     IMPRESSION:  A 35-year-old  0, para 0 with abnormal uterine bleeding   with a small submucosal fibroid with thickened endometrial lining, scheduled   for hysteroscopy, dilatation and curettage, and resection of polyp/fibroid.    Preoperative instructions was given.  Operative procedure discussed in detail.    Postoperative recovery explained.  The patient  understands and wants to go   ahead with the procedure.  Blood pressure today has been 160/88 mmHg.  The   patient has not been able to sleep for a few days and has been using   hydroxyzine.  The patient denies any high blood pressure prior.  Repeat blood   pressure was 140/90 mmHg.  The patient has a primary care and will follow up   with the primary care regarding the blood pressure.  All questions regarding   surgery was answered to satisfaction.        ______________________________  MD DELMY Irving/CARLITO    DD:  04/08/2021 14:58  DT:  04/08/2021 16:20    Job#:  [id number]

## 2021-04-14 ENCOUNTER — PRE-ADMISSION TESTING (OUTPATIENT)
Dept: ADMISSIONS | Facility: MEDICAL CENTER | Age: 36
DRG: 989 | End: 2021-04-14
Attending: OBSTETRICS & GYNECOLOGY
Payer: MEDICAID

## 2021-04-14 DIAGNOSIS — Z01.812 PRE-OPERATIVE LABORATORY EXAMINATION: ICD-10-CM

## 2021-04-14 LAB
ANION GAP SERPL CALC-SCNC: 7 MMOL/L (ref 7–16)
APPEARANCE UR: CLEAR
BASOPHILS # BLD AUTO: 0.4 % (ref 0–1.8)
BASOPHILS # BLD: 0.02 K/UL (ref 0–0.12)
BILIRUB UR QL STRIP.AUTO: NEGATIVE
BUN SERPL-MCNC: 9 MG/DL (ref 8–22)
CALCIUM SERPL-MCNC: 8.8 MG/DL (ref 8.5–10.5)
CHLORIDE SERPL-SCNC: 106 MMOL/L (ref 96–112)
CO2 SERPL-SCNC: 25 MMOL/L (ref 20–33)
COLOR UR: YELLOW
CREAT SERPL-MCNC: 0.55 MG/DL (ref 0.5–1.4)
EOSINOPHIL # BLD AUTO: 0.14 K/UL (ref 0–0.51)
EOSINOPHIL NFR BLD: 2.8 % (ref 0–6.9)
ERYTHROCYTE [DISTWIDTH] IN BLOOD BY AUTOMATED COUNT: 49.1 FL (ref 35.9–50)
GLUCOSE SERPL-MCNC: 88 MG/DL (ref 65–99)
GLUCOSE UR STRIP.AUTO-MCNC: NEGATIVE MG/DL
HCT VFR BLD AUTO: 42.6 % (ref 37–47)
HGB BLD-MCNC: 13.3 G/DL (ref 12–16)
IMM GRANULOCYTES # BLD AUTO: 0.01 K/UL (ref 0–0.11)
IMM GRANULOCYTES NFR BLD AUTO: 0.2 % (ref 0–0.9)
KETONES UR STRIP.AUTO-MCNC: NEGATIVE MG/DL
LEUKOCYTE ESTERASE UR QL STRIP.AUTO: NEGATIVE
LYMPHOCYTES # BLD AUTO: 1.56 K/UL (ref 1–4.8)
LYMPHOCYTES NFR BLD: 31.6 % (ref 22–41)
MCH RBC QN AUTO: 27.3 PG (ref 27–33)
MCHC RBC AUTO-ENTMCNC: 31.2 G/DL (ref 33.6–35)
MCV RBC AUTO: 87.3 FL (ref 81.4–97.8)
MICRO URNS: NORMAL
MONOCYTES # BLD AUTO: 0.42 K/UL (ref 0–0.85)
MONOCYTES NFR BLD AUTO: 8.5 % (ref 0–13.4)
NEUTROPHILS # BLD AUTO: 2.79 K/UL (ref 2–7.15)
NEUTROPHILS NFR BLD: 56.5 % (ref 44–72)
NITRITE UR QL STRIP.AUTO: NEGATIVE
NRBC # BLD AUTO: 0 K/UL
NRBC BLD-RTO: 0 /100 WBC
PH UR STRIP.AUTO: 5.5 [PH] (ref 5–8)
PLATELET # BLD AUTO: 219 K/UL (ref 164–446)
PMV BLD AUTO: 9.5 FL (ref 9–12.9)
POTASSIUM SERPL-SCNC: 4.5 MMOL/L (ref 3.6–5.5)
PROT UR QL STRIP: NEGATIVE MG/DL
RBC # BLD AUTO: 4.88 M/UL (ref 4.2–5.4)
RBC UR QL AUTO: NEGATIVE
SODIUM SERPL-SCNC: 138 MMOL/L (ref 135–145)
SP GR UR STRIP.AUTO: 1.01
UROBILINOGEN UR STRIP.AUTO-MCNC: 0.2 MG/DL
WBC # BLD AUTO: 4.9 K/UL (ref 4.8–10.8)

## 2021-04-14 PROCEDURE — 36415 COLL VENOUS BLD VENIPUNCTURE: CPT

## 2021-04-14 PROCEDURE — 81003 URINALYSIS AUTO W/O SCOPE: CPT

## 2021-04-14 PROCEDURE — 85025 COMPLETE CBC W/AUTO DIFF WBC: CPT

## 2021-04-14 PROCEDURE — 80048 BASIC METABOLIC PNL TOTAL CA: CPT

## 2021-04-14 RX ORDER — IBUPROFEN 600 MG/1
600 TABLET ORAL EVERY 6 HOURS PRN
COMMUNITY
End: 2023-12-25

## 2021-04-14 RX ORDER — HYDROXYZINE HYDROCHLORIDE 10 MG/1
10 TABLET, FILM COATED ORAL PRN
COMMUNITY
End: 2023-12-25

## 2021-04-17 ENCOUNTER — PRE-ADMISSION TESTING (OUTPATIENT)
Dept: ADMISSIONS | Facility: MEDICAL CENTER | Age: 36
DRG: 989 | End: 2021-04-17
Attending: OBSTETRICS & GYNECOLOGY
Payer: MEDICAID

## 2021-04-17 DIAGNOSIS — Z01.812 PRE-OPERATIVE LABORATORY EXAMINATION: ICD-10-CM

## 2021-04-17 LAB — COVID ORDER STATUS COVID19: NORMAL

## 2021-04-17 PROCEDURE — C9803 HOPD COVID-19 SPEC COLLECT: HCPCS

## 2021-04-17 PROCEDURE — U0003 INFECTIOUS AGENT DETECTION BY NUCLEIC ACID (DNA OR RNA); SEVERE ACUTE RESPIRATORY SYNDROME CORONAVIRUS 2 (SARS-COV-2) (CORONAVIRUS DISEASE [COVID-19]), AMPLIFIED PROBE TECHNIQUE, MAKING USE OF HIGH THROUGHPUT TECHNOLOGIES AS DESCRIBED BY CMS-2020-01-R: HCPCS

## 2021-04-17 PROCEDURE — U0005 INFEC AGEN DETEC AMPLI PROBE: HCPCS

## 2021-04-18 LAB
SARS-COV-2 RNA RESP QL NAA+PROBE: NOTDETECTED
SPECIMEN SOURCE: NORMAL

## 2021-04-21 ENCOUNTER — HOSPITAL ENCOUNTER (INPATIENT)
Facility: MEDICAL CENTER | Age: 36
LOS: 1 days | DRG: 989 | End: 2021-04-21
Attending: OBSTETRICS & GYNECOLOGY | Admitting: OBSTETRICS & GYNECOLOGY
Payer: MEDICAID

## 2021-04-21 ENCOUNTER — ANESTHESIA (OUTPATIENT)
Dept: SURGERY | Facility: MEDICAL CENTER | Age: 36
DRG: 989 | End: 2021-04-21
Payer: MEDICAID

## 2021-04-21 ENCOUNTER — ANESTHESIA EVENT (OUTPATIENT)
Dept: SURGERY | Facility: MEDICAL CENTER | Age: 36
DRG: 989 | End: 2021-04-21
Payer: MEDICAID

## 2021-04-21 VITALS
BODY MASS INDEX: 31.07 KG/M2 | TEMPERATURE: 97.3 F | HEART RATE: 78 BPM | DIASTOLIC BLOOD PRESSURE: 64 MMHG | WEIGHT: 205.03 LBS | RESPIRATION RATE: 18 BRPM | OXYGEN SATURATION: 96 % | SYSTOLIC BLOOD PRESSURE: 116 MMHG | HEIGHT: 68 IN

## 2021-04-21 DIAGNOSIS — G89.18 POST-OP PAIN: ICD-10-CM

## 2021-04-21 LAB
BASOPHILS # BLD AUTO: 0.4 % (ref 0–1.8)
BASOPHILS # BLD: 0.02 K/UL (ref 0–0.12)
EOSINOPHIL # BLD AUTO: 0.04 K/UL (ref 0–0.51)
EOSINOPHIL NFR BLD: 0.7 % (ref 0–6.9)
ERYTHROCYTE [DISTWIDTH] IN BLOOD BY AUTOMATED COUNT: 48.3 FL (ref 35.9–50)
HCG UR QL: NEGATIVE
HCT VFR BLD AUTO: 40 % (ref 37–47)
HGB BLD-MCNC: 12.4 G/DL (ref 12–16)
IMM GRANULOCYTES # BLD AUTO: 0.02 K/UL (ref 0–0.11)
IMM GRANULOCYTES NFR BLD AUTO: 0.4 % (ref 0–0.9)
LYMPHOCYTES # BLD AUTO: 0.62 K/UL (ref 1–4.8)
LYMPHOCYTES NFR BLD: 11.1 % (ref 22–41)
MCH RBC QN AUTO: 27.1 PG (ref 27–33)
MCHC RBC AUTO-ENTMCNC: 31 G/DL (ref 33.6–35)
MCV RBC AUTO: 87.5 FL (ref 81.4–97.8)
MONOCYTES # BLD AUTO: 0.12 K/UL (ref 0–0.85)
MONOCYTES NFR BLD AUTO: 2.1 % (ref 0–13.4)
NEUTROPHILS # BLD AUTO: 4.78 K/UL (ref 2–7.15)
NEUTROPHILS NFR BLD: 85.3 % (ref 44–72)
NRBC # BLD AUTO: 0 K/UL
NRBC BLD-RTO: 0 /100 WBC
PATHOLOGY CONSULT NOTE: NORMAL
PLATELET # BLD AUTO: 201 K/UL (ref 164–446)
PMV BLD AUTO: 9.1 FL (ref 9–12.9)
RBC # BLD AUTO: 4.57 M/UL (ref 4.2–5.4)
WBC # BLD AUTO: 5.6 K/UL (ref 4.8–10.8)

## 2021-04-21 PROCEDURE — 85025 COMPLETE CBC W/AUTO DIFF WBC: CPT

## 2021-04-21 PROCEDURE — 160041 HCHG SURGERY MINUTES - EA ADDL 1 MIN LEVEL 4: Performed by: OBSTETRICS & GYNECOLOGY

## 2021-04-21 PROCEDURE — 700101 HCHG RX REV CODE 250: Performed by: OBSTETRICS & GYNECOLOGY

## 2021-04-21 PROCEDURE — 88305 TISSUE EXAM BY PATHOLOGIST: CPT

## 2021-04-21 PROCEDURE — 700105 HCHG RX REV CODE 258: Performed by: OBSTETRICS & GYNECOLOGY

## 2021-04-21 PROCEDURE — 160048 HCHG OR STATISTICAL LEVEL 1-5: Performed by: OBSTETRICS & GYNECOLOGY

## 2021-04-21 PROCEDURE — 502587 HCHG PACK, D&C: Performed by: OBSTETRICS & GYNECOLOGY

## 2021-04-21 PROCEDURE — 306637 HCHG MISC ORTHO ITEM RC 0274

## 2021-04-21 PROCEDURE — 700111 HCHG RX REV CODE 636 W/ 250 OVERRIDE (IP): Performed by: ANESTHESIOLOGY

## 2021-04-21 PROCEDURE — 160036 HCHG PACU - EA ADDL 30 MINS PHASE I: Performed by: OBSTETRICS & GYNECOLOGY

## 2021-04-21 PROCEDURE — A9270 NON-COVERED ITEM OR SERVICE: HCPCS | Performed by: OBSTETRICS & GYNECOLOGY

## 2021-04-21 PROCEDURE — 81025 URINE PREGNANCY TEST: CPT

## 2021-04-21 PROCEDURE — 160002 HCHG RECOVERY MINUTES (STAT): Performed by: OBSTETRICS & GYNECOLOGY

## 2021-04-21 PROCEDURE — 0UDB8ZZ EXTRACTION OF ENDOMETRIUM, VIA NATURAL OR ARTIFICIAL OPENING ENDOSCOPIC: ICD-10-PCS | Performed by: OBSTETRICS & GYNECOLOGY

## 2021-04-21 PROCEDURE — 700101 HCHG RX REV CODE 250: Performed by: ANESTHESIOLOGY

## 2021-04-21 PROCEDURE — 160035 HCHG PACU - 1ST 60 MINS PHASE I: Performed by: OBSTETRICS & GYNECOLOGY

## 2021-04-21 PROCEDURE — 160046 HCHG PACU - 1ST 60 MINS PHASE II: Performed by: OBSTETRICS & GYNECOLOGY

## 2021-04-21 PROCEDURE — 160029 HCHG SURGERY MINUTES - 1ST 30 MINS LEVEL 4: Performed by: OBSTETRICS & GYNECOLOGY

## 2021-04-21 PROCEDURE — 501838 HCHG SUTURE GENERAL: Performed by: OBSTETRICS & GYNECOLOGY

## 2021-04-21 PROCEDURE — 160009 HCHG ANES TIME/MIN: Performed by: OBSTETRICS & GYNECOLOGY

## 2021-04-21 PROCEDURE — 160025 RECOVERY II MINUTES (STATS): Performed by: OBSTETRICS & GYNECOLOGY

## 2021-04-21 RX ORDER — ENEMA 19; 7 G/133ML; G/133ML
1 ENEMA RECTAL
Status: CANCELLED | OUTPATIENT
Start: 2021-04-21

## 2021-04-21 RX ORDER — AMOXICILLIN 250 MG
1 CAPSULE ORAL
Status: CANCELLED | OUTPATIENT
Start: 2021-04-21

## 2021-04-21 RX ORDER — ONDANSETRON 2 MG/ML
INJECTION INTRAMUSCULAR; INTRAVENOUS PRN
Status: DISCONTINUED | OUTPATIENT
Start: 2021-04-21 | End: 2021-04-21 | Stop reason: SURG

## 2021-04-21 RX ORDER — DEXAMETHASONE SODIUM PHOSPHATE 4 MG/ML
4 INJECTION, SOLUTION INTRA-ARTICULAR; INTRALESIONAL; INTRAMUSCULAR; INTRAVENOUS; SOFT TISSUE
Status: CANCELLED | OUTPATIENT
Start: 2021-04-21

## 2021-04-21 RX ORDER — METHYLERGONOVINE MALEATE 0.2 MG/ML
INJECTION INTRAVENOUS
Status: DISCONTINUED
Start: 2021-04-21 | End: 2021-04-21 | Stop reason: HOSPADM

## 2021-04-21 RX ORDER — HYDROMORPHONE HYDROCHLORIDE 1 MG/ML
0.4 INJECTION, SOLUTION INTRAMUSCULAR; INTRAVENOUS; SUBCUTANEOUS
Status: DISCONTINUED | OUTPATIENT
Start: 2021-04-21 | End: 2021-04-21 | Stop reason: HOSPADM

## 2021-04-21 RX ORDER — SODIUM CHLORIDE, SODIUM LACTATE, POTASSIUM CHLORIDE, CALCIUM CHLORIDE 600; 310; 30; 20 MG/100ML; MG/100ML; MG/100ML; MG/100ML
INJECTION, SOLUTION INTRAVENOUS CONTINUOUS
Status: DISCONTINUED | OUTPATIENT
Start: 2021-04-21 | End: 2021-04-21 | Stop reason: HOSPADM

## 2021-04-21 RX ORDER — SCOLOPAMINE TRANSDERMAL SYSTEM 1 MG/1
1 PATCH, EXTENDED RELEASE TRANSDERMAL
Status: CANCELLED | OUTPATIENT
Start: 2021-04-21

## 2021-04-21 RX ORDER — DOCUSATE SODIUM 100 MG/1
100 CAPSULE, LIQUID FILLED ORAL 2 TIMES DAILY
Status: CANCELLED | OUTPATIENT
Start: 2021-04-21

## 2021-04-21 RX ORDER — ROCURONIUM BROMIDE 10 MG/ML
INJECTION, SOLUTION INTRAVENOUS PRN
Status: DISCONTINUED | OUTPATIENT
Start: 2021-04-21 | End: 2021-04-21 | Stop reason: SURG

## 2021-04-21 RX ORDER — SODIUM CHLORIDE 0.65 %
2 AEROSOL, SPRAY (ML) NASAL PRN
COMMUNITY
Start: 2021-02-23 | End: 2023-12-25

## 2021-04-21 RX ORDER — BUPIVACAINE HYDROCHLORIDE 2.5 MG/ML
INJECTION, SOLUTION EPIDURAL; INFILTRATION; INTRACAUDAL
Status: DISCONTINUED
Start: 2021-04-21 | End: 2021-04-21 | Stop reason: HOSPADM

## 2021-04-21 RX ORDER — CARBOXYMETHYLCELLULOSE SODIUM 0.5 G/100ML
2 SOLUTION/ DROPS OPHTHALMIC PRN
COMMUNITY
Start: 2021-03-29 | End: 2023-12-25

## 2021-04-21 RX ORDER — LABETALOL HYDROCHLORIDE 5 MG/ML
5 INJECTION, SOLUTION INTRAVENOUS
Status: DISCONTINUED | OUTPATIENT
Start: 2021-04-21 | End: 2021-04-21 | Stop reason: HOSPADM

## 2021-04-21 RX ORDER — DEXAMETHASONE SODIUM PHOSPHATE 4 MG/ML
INJECTION, SOLUTION INTRA-ARTICULAR; INTRALESIONAL; INTRAMUSCULAR; INTRAVENOUS; SOFT TISSUE PRN
Status: DISCONTINUED | OUTPATIENT
Start: 2021-04-21 | End: 2021-04-21 | Stop reason: SURG

## 2021-04-21 RX ORDER — OXYCODONE HYDROCHLORIDE 5 MG/1
10 TABLET ORAL
Status: CANCELLED | OUTPATIENT
Start: 2021-04-21

## 2021-04-21 RX ORDER — IBUPROFEN 600 MG/1
600 TABLET ORAL EVERY 6 HOURS PRN
Qty: 20 TABLET | Refills: 1 | Status: SHIPPED | OUTPATIENT
Start: 2021-04-21 | End: 2023-12-25

## 2021-04-21 RX ORDER — BISACODYL 10 MG
10 SUPPOSITORY, RECTAL RECTAL
Status: CANCELLED | OUTPATIENT
Start: 2021-04-21

## 2021-04-21 RX ORDER — ACETAMINOPHEN 500 MG
1000 TABLET ORAL EVERY 6 HOURS PRN
Status: CANCELLED | OUTPATIENT
Start: 2021-04-26

## 2021-04-21 RX ORDER — POLYETHYLENE GLYCOL 3350 17 G/17G
1 POWDER, FOR SOLUTION ORAL 2 TIMES DAILY PRN
Status: CANCELLED | OUTPATIENT
Start: 2021-04-21

## 2021-04-21 RX ORDER — OXYCODONE HYDROCHLORIDE 5 MG/1
5 TABLET ORAL
Status: CANCELLED | OUTPATIENT
Start: 2021-04-21

## 2021-04-21 RX ORDER — HYDROMORPHONE HYDROCHLORIDE 1 MG/ML
0.2 INJECTION, SOLUTION INTRAMUSCULAR; INTRAVENOUS; SUBCUTANEOUS
Status: DISCONTINUED | OUTPATIENT
Start: 2021-04-21 | End: 2021-04-21 | Stop reason: HOSPADM

## 2021-04-21 RX ORDER — IBUPROFEN 400 MG/1
800 TABLET ORAL 3 TIMES DAILY PRN
Status: CANCELLED | OUTPATIENT
Start: 2021-04-26

## 2021-04-21 RX ORDER — OXYCODONE HYDROCHLORIDE AND ACETAMINOPHEN 5; 325 MG/1; MG/1
2 TABLET ORAL
Status: DISCONTINUED | OUTPATIENT
Start: 2021-04-21 | End: 2021-04-21 | Stop reason: HOSPADM

## 2021-04-21 RX ORDER — SODIUM CHLORIDE, SODIUM LACTATE, POTASSIUM CHLORIDE, CALCIUM CHLORIDE 600; 310; 30; 20 MG/100ML; MG/100ML; MG/100ML; MG/100ML
INJECTION, SOLUTION INTRAVENOUS EVERY 6 HOURS
Status: CANCELLED | OUTPATIENT
Start: 2021-04-21 | End: 2021-04-21

## 2021-04-21 RX ORDER — HYDROMORPHONE HYDROCHLORIDE 1 MG/ML
0.1 INJECTION, SOLUTION INTRAMUSCULAR; INTRAVENOUS; SUBCUTANEOUS
Status: DISCONTINUED | OUTPATIENT
Start: 2021-04-21 | End: 2021-04-21 | Stop reason: HOSPADM

## 2021-04-21 RX ORDER — DIPHENHYDRAMINE HYDROCHLORIDE 50 MG/ML
25 INJECTION INTRAMUSCULAR; INTRAVENOUS EVERY 6 HOURS PRN
Status: CANCELLED | OUTPATIENT
Start: 2021-04-21

## 2021-04-21 RX ORDER — AMOXICILLIN 250 MG
1 CAPSULE ORAL NIGHTLY
Status: CANCELLED | OUTPATIENT
Start: 2021-04-21

## 2021-04-21 RX ORDER — MIDAZOLAM HYDROCHLORIDE 1 MG/ML
INJECTION INTRAMUSCULAR; INTRAVENOUS PRN
Status: DISCONTINUED | OUTPATIENT
Start: 2021-04-21 | End: 2021-04-21 | Stop reason: SURG

## 2021-04-21 RX ORDER — OXYCODONE HYDROCHLORIDE AND ACETAMINOPHEN 5; 325 MG/1; MG/1
1 TABLET ORAL
Qty: 20 TABLET | Refills: 0 | Status: SHIPPED | OUTPATIENT
Start: 2021-04-21 | End: 2021-04-27

## 2021-04-21 RX ORDER — VASOPRESSIN 20 U/ML
INJECTION PARENTERAL
Status: DISCONTINUED
Start: 2021-04-21 | End: 2021-04-21 | Stop reason: HOSPADM

## 2021-04-21 RX ORDER — ACETAMINOPHEN 500 MG
1000 TABLET ORAL EVERY 6 HOURS
Status: CANCELLED | OUTPATIENT
Start: 2021-04-21 | End: 2021-04-26

## 2021-04-21 RX ORDER — SUCCINYLCHOLINE/SOD CL,ISO/PF 200MG/10ML
SYRINGE (ML) INTRAVENOUS PRN
Status: DISCONTINUED | OUTPATIENT
Start: 2021-04-21 | End: 2021-04-21 | Stop reason: SURG

## 2021-04-21 RX ORDER — METOPROLOL TARTRATE 1 MG/ML
1 INJECTION, SOLUTION INTRAVENOUS
Status: DISCONTINUED | OUTPATIENT
Start: 2021-04-21 | End: 2021-04-21 | Stop reason: HOSPADM

## 2021-04-21 RX ORDER — SODIUM CHLORIDE, SODIUM LACTATE, POTASSIUM CHLORIDE, CALCIUM CHLORIDE 600; 310; 30; 20 MG/100ML; MG/100ML; MG/100ML; MG/100ML
INJECTION, SOLUTION INTRAVENOUS CONTINUOUS
Status: ACTIVE | OUTPATIENT
Start: 2021-04-21 | End: 2021-04-21

## 2021-04-21 RX ORDER — IBUPROFEN 400 MG/1
800 TABLET ORAL 3 TIMES DAILY
Status: CANCELLED | OUTPATIENT
Start: 2021-04-21 | End: 2021-04-26

## 2021-04-21 RX ORDER — IBUPROFEN 600 MG/1
600 TABLET ORAL EVERY 6 HOURS PRN
Status: CANCELLED | OUTPATIENT
Start: 2021-04-21

## 2021-04-21 RX ORDER — ONDANSETRON 2 MG/ML
4 INJECTION INTRAMUSCULAR; INTRAVENOUS EVERY 4 HOURS PRN
Status: CANCELLED | OUTPATIENT
Start: 2021-04-21

## 2021-04-21 RX ORDER — GLYCOPYRROLATE 0.2 MG/ML
INJECTION INTRAMUSCULAR; INTRAVENOUS PRN
Status: DISCONTINUED | OUTPATIENT
Start: 2021-04-21 | End: 2021-04-21 | Stop reason: SURG

## 2021-04-21 RX ORDER — OXYCODONE HYDROCHLORIDE AND ACETAMINOPHEN 5; 325 MG/1; MG/1
1 TABLET ORAL EVERY 4 HOURS PRN
Status: CANCELLED | OUTPATIENT
Start: 2021-04-21

## 2021-04-21 RX ORDER — HALOPERIDOL 5 MG/ML
1 INJECTION INTRAMUSCULAR
Status: DISCONTINUED | OUTPATIENT
Start: 2021-04-21 | End: 2021-04-21 | Stop reason: HOSPADM

## 2021-04-21 RX ORDER — OXYCODONE HYDROCHLORIDE AND ACETAMINOPHEN 5; 325 MG/1; MG/1
1 TABLET ORAL
Status: DISCONTINUED | OUTPATIENT
Start: 2021-04-21 | End: 2021-04-21 | Stop reason: HOSPADM

## 2021-04-21 RX ORDER — HALOPERIDOL 5 MG/ML
1 INJECTION INTRAMUSCULAR EVERY 6 HOURS PRN
Status: CANCELLED | OUTPATIENT
Start: 2021-04-21

## 2021-04-21 RX ORDER — HYDROMORPHONE HYDROCHLORIDE 1 MG/ML
0.5 INJECTION, SOLUTION INTRAMUSCULAR; INTRAVENOUS; SUBCUTANEOUS
Status: CANCELLED | OUTPATIENT
Start: 2021-04-21

## 2021-04-21 RX ORDER — DIPHENHYDRAMINE HYDROCHLORIDE 50 MG/ML
12.5 INJECTION INTRAMUSCULAR; INTRAVENOUS
Status: DISCONTINUED | OUTPATIENT
Start: 2021-04-21 | End: 2021-04-21 | Stop reason: HOSPADM

## 2021-04-21 RX ADMIN — SODIUM CHLORIDE, POTASSIUM CHLORIDE, SODIUM LACTATE AND CALCIUM CHLORIDE: 600; 310; 30; 20 INJECTION, SOLUTION INTRAVENOUS at 11:45

## 2021-04-21 RX ADMIN — ONDANSETRON 4 MG: 2 INJECTION INTRAMUSCULAR; INTRAVENOUS at 13:29

## 2021-04-21 RX ADMIN — POVIDONE IODINE 15 ML: 100 SOLUTION TOPICAL at 11:59

## 2021-04-21 RX ADMIN — PROPOFOL 150 MG: 10 INJECTION, EMULSION INTRAVENOUS at 13:04

## 2021-04-21 RX ADMIN — ROCURONIUM BROMIDE 5 MG: 10 INJECTION, SOLUTION INTRAVENOUS at 13:04

## 2021-04-21 RX ADMIN — FENTANYL CITRATE 100 MCG: 50 INJECTION, SOLUTION INTRAMUSCULAR; INTRAVENOUS at 13:04

## 2021-04-21 RX ADMIN — MIDAZOLAM HYDROCHLORIDE 2 MG: 1 INJECTION, SOLUTION INTRAMUSCULAR; INTRAVENOUS at 12:51

## 2021-04-21 RX ADMIN — DEXAMETHASONE SODIUM PHOSPHATE 8 MG: 4 INJECTION, SOLUTION INTRA-ARTICULAR; INTRALESIONAL; INTRAMUSCULAR; INTRAVENOUS; SOFT TISSUE at 12:53

## 2021-04-21 RX ADMIN — GLYCOPYRROLATE 0.1 MG: 0.2 INJECTION INTRAMUSCULAR; INTRAVENOUS at 13:15

## 2021-04-21 RX ADMIN — Medication 100 MG: at 13:04

## 2021-04-21 ASSESSMENT — PAIN SCALES - GENERAL: PAIN_LEVEL: 3

## 2021-04-21 ASSESSMENT — PAIN DESCRIPTION - PAIN TYPE
TYPE: SURGICAL PAIN

## 2021-04-21 NOTE — OR NURSING
1337- Pt to PACU bay 6 from OR. Report from anesthesia and OR RN. Oral airway in place. Respirations even and unlabored. VSS. Peripad in place, small amount of spotting noted.    1338- Pt waking up. Oral airway D/C'd    1344- ERAs protocol started    1350- Dr. Rehman at bedside to update patient. Plan for patient to stay inpatient overnight.    1420- Report to Nasrin RN    1500- Report from Nasrin RN, care reassumed.     1502- Pt weaned off of supplemental O2. SpO2 stable on room air. Able to tolerate PO intake.    1510- Dr. Rehman to bedside to speak with patient. Pt requesting to go home. Orders obtained for CBC. Ok to discharge patient home after 4 hours if CBC and bleeding stable. Phase II criteria met.    1710- Pt up to bathroom. Able to void. Dressed independently. Pt's mother, Tasha, called and updated. Discharge instructions discusses. Verbalized understanding.     1715- PIV removed with tip intact.    1739- Discharge criteria met. CBC and bleeding WDL. Pt escorted out of department ambulatory. Discharged home to responsible adult.

## 2021-04-21 NOTE — DISCHARGE INSTRUCTIONS
ACTIVITY: Rest and take it easy for the first 24 hours.  A responsible adult is recommended to remain with you during that time.  It is normal to feel sleepy.  We encourage you to not do anything that requires balance, judgment or coordination.    MILD FLU-LIKE SYMPTOMS ARE NORMAL. YOU MAY EXPERIENCE GENERALIZED MUSCLE ACHES, THROAT IRRITATION, HEADACHE AND/OR SOME NAUSEA.    FOR 24 HOURS DO NOT:  Drive, operate machinery or run household appliances.  Drink beer or alcoholic beverages.   Make important decisions or sign legal documents.    SPECIAL INSTRUCTIONS: See handout. Pelvic rest for 2 weeks.    DIET: To avoid nausea, slowly advance diet as tolerated, avoiding spicy or greasy foods for the first day.  Add more substantial food to your diet according to your physician's instructions.  Babies can be fed formula or breast milk as soon as they are hungry.  INCREASE FLUIDS AND FIBER TO AVOID CONSTIPATION.    SURGICAL DRESSING/BATHING: Ok to shower. No submerging in water  (baths, pools, hot tubs, etc) for 2 weeks or until cleared by Dr. Rehman.    FOLLOW-UP APPOINTMENT:  A follow-up appointment should be arranged with your doctor in 2 weeks; call to schedule.    You should CALL YOUR PHYSICIAN if you develop:  Fever greater than 101 degrees F.  Pain not relieved by medication, or persistent nausea or vomiting.  Excessive bleeding (blood soaking through dressing) or unexpected drainage from the wound.  Extreme redness or swelling around the incision site, drainage of pus or foul smelling drainage.  Inability to urinate or empty your bladder within 8 hours.  Problems with breathing or chest pain.    You should call 911 if you develop problems with breathing or chest pain.  If you are unable to contact your doctor or surgical center, you should go to the nearest emergency room or urgent care center.  Physician's telephone #: [phone]    If any questions arise, call your doctor.  If your doctor is not  available, please feel free to call the Surgical Center at [phone]. The Contact Center is open Monday through Friday 7AM to 5PM and may speak to a nurse at [phone], or toll free at [phone].     A registered nurse may call you a few days after your surgery to see how you are doing after your procedure.    MEDICATIONS: Resume taking daily medication.  Take prescribed pain medication with food.  If no medication is prescribed, you may take non-aspirin pain medication if needed.  PAIN MEDICATION CAN BE VERY CONSTIPATING.  Take a stool softener or laxative such as senokot, pericolace, or milk of magnesia if needed.    Prescription given for Percocet.  You may start this medication at any time    If your physician has prescribed pain medication that includes Acetaminophen (Tylenol), do not take additional Acetaminophen (Tylenol) while taking the prescribed medication.    Depression / Suicide Risk    As you are discharged from this [facility] facility, it is important to learn how to keep safe from harming yourself.    Recognize the warning signs:  · Abrupt changes in personality, positive or negative- including increase in energy   · Giving away possessions  · Change in eating patterns- significant weight changes-  positive or negative  · Change in sleeping patterns- unable to sleep or sleeping all the time   · Unwillingness or inability to communicate  · Depression  · Unusual sadness, discouragement and loneliness  · Talk of wanting to die  · Neglect of personal appearance   · Rebelliousness- reckless behavior  · Withdrawal from people/activities they love  · Confusion- inability to concentrate     If you or a loved one observes any of these behaviors or has concerns about self-harm, here's what you can do:  · Talk about it- your feelings and reasons for harming yourself  · Remove any means that you might use to hurt yourself (examples: pills, rope, extension cords, firearm)  · Get professional help  from the community (Mental Health, Substance Abuse, psychological counseling)  · Do not be alone:Call your Safe Contact- someone whom you trust who will be there for you.  · Call your local CRISIS HOTLINE 990-3311 or 691-039-9507  · Call your local Children's Mobile Crisis Response Team [place] (157) 872-3630 or www.[employer]  · Call the toll free National Suicide Prevention Hotlines   · National Suicide Prevention Lifeline 519-070-OJLO (2786)  · National Hope Line Network 800-SUICIDE (209-7101)

## 2021-04-21 NOTE — ANESTHESIA PROCEDURE NOTES
Airway    Date/Time: 4/21/2021 1:04 PM  Performed by: Rudy Cesar D.O.  Authorized by: Rudy Cesar D.O.     Location:  OR  Urgency:  Elective  Indications for Airway Management:  Anesthesia      Spontaneous Ventilation: absent    Sedation Level:  Deep  Preoxygenated: Yes    Patient Position:  Sniffing  Final Airway Type:  Endotracheal airway  Final Endotracheal Airway:  ETT  Cuffed: Yes    Technique Used for Successful ETT Placement:  Direct laryngoscopy    Insertion Site:  Oral  Blade Type:  Mendez  Laryngoscope Blade/Videolaryngoscope Blade Size:  3  ETT Size (mm):  7.5  Measured from:  Lips  ETT to Lips (cm):  21  Placement Verified by: auscultation and capnometry    Cormack-Lehane Classification:  Grade IIa - partial view of glottis  Number of Attempts at Approach:  1

## 2021-04-21 NOTE — OP REPORT
DATE OF SERVICE:  2021     INDICATIONS:  The patient is a 35-year-old  0, para 0 who was seen for   abnormal uterine bleeding and had an ultrasound suggestive of an endometrial   polyp and also intramural fibroid.  The patient here for hysteroscopy,   dilatation and curettage, and polypectomy.     PREOPERATIVE DIAGNOSIS:  Abnormal uterine bleeding, possible endometrial   polyp.     POSTOPERATIVE DIAGNOSIS:  Abnormal uterine bleeding.     PROCEDURE PERFORMED:  Hysteroscopy, dilatation and curettage.     ANESTHESIA:  General.     ANESTHESIOLOGIST:  Rudy Cesar DO     SURGEON:  Perla Rehman MD     ESTIMATED BLOOD LOSS:  Minimal.     DESCRIPTION OF PROCEDURE:  The patient was consented and taken to the   operating room.  General anesthesia was induced without difficulty, prepped   and draped in the normal dorsal lithotomy position.  Bladder was straight   catheterized, 10 mL of clear urine was noted.  The posterior vaginal wall was   retracted with a self-retaining speculum.  Anterior lip of the cervix was held   with tenaculum.  Difficult to sound the cervix, so the cervix was initially   dilated with ____ dilator serially up to 2-3 cm from the external os.  Then,   the hysteroscope was introduced and kind of estimated the possibility of the   direction of the cavity of the uterus.  Then, again sounding was done up to 7   cm and serial dilatation to #8 Hegar's dilator.  Hysteroscope was introduced   and advanced under direct visualization.  During entry, perforation was   confirmed, hence withdrew the hysteroscope and just a curettage on the left   side where the possibility of endometrial lining was seen and perforation was   noted on the right side.  ____ specimen was sent for pathology.  All the   instruments were removed.  No bleeding from the cervical os or tenaculum held   site was noted.  The patient was extubated and transferred to the recovery   room under stable  condition.        ______________________________  MD THANG Irving    DD:  04/21/2021 13:43  DT:  04/21/2021 14:31    Job#:  [id number]

## 2021-04-21 NOTE — OR SURGEON
Immediate Post OP Note    PreOp Diagnosis: AUB   endometrial polyp.      PostOp Diagnosis: same.    Procedure(s):  HYSTEROSCOPY - Wound Class: Clean Contaminated  DILATION AND CURETTAGE. - Wound Class: Clean Contaminated    Surgeon(s):  Perla Rehman M.D.    Anesthesiologist/Type of Anesthesia:  Anesthesiologist: Rudy Cesar D.O./General    Surgical Staff:  Circulator: Cassy Jones R.N.  Scrub Person: Aniyah Coker    Specimens removed if any:  ID Type Source Tests Collected by Time Destination   A : Endometrial Curretting Other Other PATHOLOGY SPECIMEN Perla Rehman M.D. 4/21/2021 1329        Estimated Blood Loss: minimal.    Findings: normal size uterus.cavity not visualised      Complications: uterine perforation.        4/21/2021 1:38 PM Perla Rehman M.D.

## 2021-04-21 NOTE — OR NURSING
1420 Received report from Ute GOODRICH, assumed care of pt at this time. Pt anxious about being admitted, pt wishes to be discharged home. Dr. Hodges to speak with pt after case.     1445 Pt ambulated to Br, gait steady, void x1.     1500 Report back to Ute GOODRICH.

## 2021-04-21 NOTE — ANESTHESIA TIME REPORT
Anesthesia Start and Stop Event Times     Date Time Event    4/21/2021 1100 Ready for Procedure     1251 Anesthesia Start     1343 Anesthesia Stop        Responsible Staff  04/21/21    Name Role Begin End    Rudy Cesar D.O. Anesth 1251 1343        Preop Diagnosis (Free Text):  Pre-op Diagnosis     ABNORMAL UTERINE BLEEDING, UTERINE POLYP        Preop Diagnosis (Codes):    Post op Diagnosis  Uterine polyp      Premium Reason  Non-Premium    Comments:

## 2021-07-27 ENCOUNTER — TELEPHONE (OUTPATIENT)
Dept: OBGYN | Facility: CLINIC | Age: 36
End: 2021-07-27

## 2021-08-26 ENCOUNTER — TELEPHONE (OUTPATIENT)
Dept: OBGYN | Facility: CLINIC | Age: 36
End: 2021-08-26

## 2021-08-26 NOTE — TELEPHONE ENCOUNTER
**Pt left message wanting to pastor appt that was cancelled in July/ Called pt back but line went silent**OV

## 2021-10-12 ENCOUNTER — GYNECOLOGY VISIT (OUTPATIENT)
Dept: OBGYN | Facility: CLINIC | Age: 36
End: 2021-10-12
Payer: MEDICAID

## 2021-10-12 ENCOUNTER — HOSPITAL ENCOUNTER (OUTPATIENT)
Facility: MEDICAL CENTER | Age: 36
End: 2021-10-12
Attending: OBSTETRICS & GYNECOLOGY
Payer: MEDICAID

## 2021-10-12 VITALS — BODY MASS INDEX: 33.75 KG/M2 | WEIGHT: 222 LBS

## 2021-10-12 DIAGNOSIS — N89.8 VAGINAL DISCHARGE: ICD-10-CM

## 2021-10-12 DIAGNOSIS — B97.7 HIGH RISK HPV INFECTION: ICD-10-CM

## 2021-10-12 DIAGNOSIS — N93.9 ABNORMAL UTERINE BLEEDING (AUB): ICD-10-CM

## 2021-10-12 PROCEDURE — 87510 GARDNER VAG DNA DIR PROBE: CPT

## 2021-10-12 PROCEDURE — 87480 CANDIDA DNA DIR PROBE: CPT

## 2021-10-12 PROCEDURE — 99204 OFFICE O/P NEW MOD 45 MIN: CPT | Performed by: OBSTETRICS & GYNECOLOGY

## 2021-10-12 PROCEDURE — 87660 TRICHOMONAS VAGIN DIR PROBE: CPT

## 2021-10-12 NOTE — PROGRESS NOTES
"Sharif Crenshaw is a 36 y.o. female who presents with New Patient (irregular bleeding)        CC: Irregular bleeding    HPI: 36-year-old  0, last menstrual period 2021, using Depo-Provera for cycle control, presents for second opinion regarding irregular bleeding.  She states she originally presented to another gynecologist for vague complaints of feeling a \"puffy area\" in her right lower abdomen.  An ultrasound was obtained, which revealed a possible small fibroid versus endometrial polyp.  At that time, she was having monthly menses, lasting 5 to 6 days, with normal flow.  She did not have any complaints of abnormal bleeding.  She underwent a D&C hysteroscopy, which was complicated by uterine perforation.  Postoperatively, she was placed on Depo-Provera for bleeding.  Pathology from the D&C revealed benign secretory type endometrium with changes suggestive of a polyp.  No atypical hyperplasia or neoplasia was identified.    Since her last Depo shot in 2021, she states she has had irregular bleeding.  She states she bleeds most days, and it is described as light and spotting.  She is concerned that the color is brown.  She complains of some vaginal odor associated with the brown discharge.  She reports cramping associated with the bleeding.    Last Pap smear in 2020 showed no intraepithelial lesion, but positive high risk HPV, not type XVI or XVIII.    Past Medical History:   Diagnosis Date   • Asthma    • Dental disorder     upper denture   • Psychiatric problem     anxiety       Past Surgical History:   Procedure Laterality Date   • HYSTEROSCOPY WITH MYOSURE  2021    Procedure: HYSTEROSCOPY;  Surgeon: Perla Rehman M.D.;  Location: SURGERY SAME DAY [facility];  Service: Gynecology   • DILATION AND CURETTAGE  2021    Procedure: DILATION AND CURETTAGE.;  Surgeon: Perla Rehman M.D.;  Location: SURGERY SAME DAY [facility];  Service: Gynecology   • " ABDOMINAL EXPLORATION           Current Outpatient Medications:   •  hydrOXYzine HCl (ATARAX) 10 MG Tab, Take 10 mg by mouth as needed for Itching. For anxiety, Disp: , Rfl:   •  ibuprofen (MOTRIN) 600 MG Tab, Take 600 mg by mouth every 6 hours as needed., Disp: , Rfl:   •  LUBRICATING PLUS EYE DROPS 0.5 % Solution, Inject 2 Drops into the eye as needed. (Patient not taking: Reported on 10/12/2021), Disp: , Rfl:   •  DEEP SEA NASAL SPRAY 0.65 % Solution, Administer 2 Sprays into affected nostril(S) as needed. (Patient not taking: Reported on 10/12/2021), Disp: , Rfl:   •  ibuprofen (MOTRIN) 600 MG Tab, Take 1 tablet by mouth every 6 hours as needed for up to 20 doses. (Patient not taking: Reported on 10/12/2021), Disp: 20 tablet, Rfl: 1  •  ondansetron (ZOFRAN ODT) 4 MG TABLET DISPERSIBLE, Take 1 Tab by mouth every 8 hours as needed for Nausea. (Patient taking differently: Take 4 mg by mouth every 8 hours as needed for Nausea (Patient not taking).), Disp: 10 Tab, Rfl: 0  •  bacitracin-polymyxin b (POLYSPORIN) 500-13244 UNIT/GM OINT, Apply to affected area bid (Patient not taking: Reported on 3/2/2020), Disp: 30 g, Rfl: 2  •  oxycodone-acetaminophen (PERCOCET) 5-325 MG TABS, Take 1-2 Tabs by mouth every four hours as needed. (Patient not taking: Reported on 3/2/2020), Disp: 50 Each, Rfl: 0  •  ondansetron (ZOFRAN) 4 MG TABS, Take 1 Tab by mouth every 8 hours as needed for Nausea/Vomiting. (Patient not taking: Reported on 3/2/2020), Disp: 10 Tab, Rfl: 1    Molds & smuts    Family History   Problem Relation Age of Onset   • Diabetes Mother    • Hypertension Father    • Heart Disease Father        Social History     Socioeconomic History   • Marital status: Single     Spouse name: Not on file   • Number of children: Not on file   • Years of education: Not on file   • Highest education level: Not on file   Occupational History   • Not on file   Tobacco Use   • Smoking status: Current Every Day Smoker     Packs/day: 1.00      Types: Cigarettes   • Smokeless tobacco: Never Used   Vaping Use   • Vaping Use: Never used   Substance and Sexual Activity   • Alcohol use: Yes     Comment: 6 week   • Drug use: Yes     Types: Inhaled     Comment: meth hx, marajuana occassionally on    • Sexual activity: Yes   Other Topics Concern   • Not on file   Social History Narrative   • Not on file     Social Determinants of Health     Financial Resource Strain:    • Difficulty of Paying Living Expenses:    Food Insecurity:    • Worried About Running Out of Food in the Last Year:    • Ran Out of Food in the Last Year:    Transportation Needs:    • Lack of Transportation (Medical):    • Lack of Transportation (Non-Medical):    Physical Activity:    • Days of Exercise per Week:    • Minutes of Exercise per Session:    Stress:    • Feeling of Stress :    Social Connections:    • Frequency of Communication with Friends and Family:    • Frequency of Social Gatherings with Friends and Family:    • Attends Yazidism Services:    • Active Member of Clubs or Organizations:    • Attends Club or Organization Meetings:    • Marital Status:    Intimate Partner Violence:    • Fear of Current or Ex-Partner:    • Emotionally Abused:    • Physically Abused:    • Sexually Abused:        OB History    Para Term  AB Living   0 0 0 0 0 0   SAB TAB Ectopic Molar Multiple Live Births   0 0 0 0 0 0       ROS REVIEW OF SYSTEMS:    Pertinent positives and negatives mentioned in HPI.    All other systems reviewed and are negative or noncontributory.           Objective     Wt 101 kg (222 lb)   LMP 2021 (LMP Unknown)   BMI 33.75 kg/m²      Physical Exam        GENERAL: Alert, in no apparent distress  PSYCHIATRIC: Appropriate affect, intact insight and judgement.  NECK:  Nontender, no masses.  No Thyromegaly or nodules. No lymphadenopathy.  RESPIRATORY: Normal respiratory effort.  Lungs clear to auscultation.   CARDIOVASCULAR: RRR, no murmur, gallop,  or rub.  ABDOMEN: Soft, nontender, nondistended.  No palpable masses.  No rebound or guarding.  No inguinal lymphadenopathy.  No hepatosplenomegaly.  No hernias.  BACK: No CVA tenderness  EXTREMITIES: No edema  SKIN: No rash    GENITOURINARY:  Normal external genitalia, no lesions.  Normal urethral meatus, no masses or tenderness.  Normal bladder without fullness or masses.  Vagina well estrogenized.  Brown, mucousy, bloody discharge is present.  Cervix without lesions or discharge, nontender.  No active bleeding is noted.  Uterus normal size, shape, and contour, nontender.  Adnexa nontender, no masses.  Normal anus and perineum.    Rectal Exam - not indicated.     35 pages of medical records reviewed [facility]  Pelvic ultrasound from 11/17/2020 reviewed  Surgical pathology from 4/21/2021 reviewed           Assessment & Plan        1. Abnormal uterine bleeding (AUB)  I suspect this is related to the Depo-Provera.  She states she is not getting her follow-up shot, which she is due for.  I recommended observation of the bleeding.  We discussed that uterine perforation is an excepted risk of D&C hysteroscopy.  Tissue was obtained, revealing no hyperplasia or malignancy.  I do not feel repeat procedure is indicated.  If her bleeding persists, we discussed a Mirena IUD or progestin only pills may be an alternative.  She does not desire definitive surgery, as she is unsure of her future childbearing.  Her partner is considering a vasectomy.  She uses tobacco, so OCPs are contraindicated.  She agrees with the plan to observe bleeding at this time.    2. Vaginal discharge  Vaginal pathogen swab obtained.  We will call if treatment is necessary.  - VAGINAL PATHOGENS DNA PANEL; Future    3.  Positive high-risk HPV infection -recommend repeat Pap smear with HPV testing in 1 year.  Discussed that if this Pap smear is abnormal, she may need colposcopy for further evaluation.    Follow-up as needed.

## 2021-10-12 NOTE — NON-PROVIDER
Pt here for new pt appt  Pt states she is experencing irregular bleeding   Pharmacy verified  Good #:[phone] (home)   PAP: 2020 +HPV  BC: Selena

## 2021-10-13 DIAGNOSIS — N89.8 VAGINAL DISCHARGE: ICD-10-CM

## 2021-10-13 LAB
AMBIGUOUS DTTM AMBI4: NORMAL
CANDIDA DNA VAG QL PROBE+SIG AMP: NEGATIVE
G VAGINALIS DNA VAG QL PROBE+SIG AMP: NEGATIVE
T VAGINALIS DNA VAG QL PROBE+SIG AMP: NEGATIVE

## 2022-01-19 ENCOUNTER — HOSPITAL ENCOUNTER (OUTPATIENT)
Dept: RADIOLOGY | Facility: MEDICAL CENTER | Age: 37
End: 2022-01-19
Attending: INTERNAL MEDICINE
Payer: MEDICAID

## 2022-01-19 DIAGNOSIS — R10.9 ACUTE ABDOMINAL PAIN: ICD-10-CM

## 2022-01-19 PROCEDURE — 76705 ECHO EXAM OF ABDOMEN: CPT

## 2022-04-21 ENCOUNTER — HOSPITAL ENCOUNTER (OUTPATIENT)
Dept: RADIOLOGY | Facility: MEDICAL CENTER | Age: 37
End: 2022-04-21
Attending: PHYSICIAN ASSISTANT
Payer: MEDICAID

## 2022-04-21 DIAGNOSIS — R10.11 ABDOMINAL PAIN, RIGHT UPPER QUADRANT: ICD-10-CM

## 2022-04-21 PROCEDURE — 74018 RADEX ABDOMEN 1 VIEW: CPT

## 2022-06-24 NOTE — ANESTHESIA POSTPROCEDURE EVALUATION
Patient: Vanessa Gillilandom    Procedure Summary     Date: 04/21/21 Room / Location: [facility] ROOM 27 / SURGERY SAME DAY [facility]    Anesthesia Start: 1251 Anesthesia Stop: 1343    Procedures:       HYSTEROSCOPY (Vagina )      DILATION AND CURETTAGE. (Vagina ) Diagnosis: (ABNORMAL UTERINE BLEEDING, UTERINE POLYP)    Surgeons: Pelra Rehman M.D. Responsible Provider: Rudy Cesar D.O.    Anesthesia Type: general ASA Status: 2          Final Anesthesia Type: general  Last vitals  BP   Blood Pressure: 129/81    Temp   37.3 °C (99.2 °F)    Pulse   72   Resp   18    SpO2   96 %      Anesthesia Post Evaluation    Patient location during evaluation: PACU  Patient participation: complete - patient participated  Level of consciousness: awake and alert  Pain score: 3    Airway patency: patent  Anesthetic complications: no  Cardiovascular status: hemodynamically stable  Respiratory status: acceptable  Hydration status: euvolemic    PONV: none          No complications documented.     Nurse Pain Score: 3 (NPRS)         [FreeTextEntry1] : Follow up for annual wellness exam

## 2022-08-10 ENCOUNTER — PHYSICAL THERAPY (OUTPATIENT)
Dept: PHYSICAL THERAPY | Facility: REHABILITATION | Age: 37
End: 2022-08-10
Attending: INTERNAL MEDICINE
Payer: MEDICAID

## 2022-08-10 DIAGNOSIS — M25.569 KNEE PAIN, UNSPECIFIED CHRONICITY, UNSPECIFIED LATERALITY: ICD-10-CM

## 2022-08-10 PROCEDURE — 97163 PT EVAL HIGH COMPLEX 45 MIN: CPT
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
## 2022-08-31 ENCOUNTER — APPOINTMENT (OUTPATIENT)
Dept: PHYSICAL THERAPY | Facility: REHABILITATION | Age: 37
End: 2022-08-31
Attending: INTERNAL MEDICINE
Payer: MEDICAID
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
## 2022-09-14 ENCOUNTER — APPOINTMENT (OUTPATIENT)
Dept: PHYSICAL THERAPY | Facility: REHABILITATION | Age: 37
End: 2022-09-14
Attending: INTERNAL MEDICINE
Payer: MEDICAID

## 2022-09-19 ENCOUNTER — APPOINTMENT (OUTPATIENT)
Dept: PHYSICAL THERAPY | Facility: REHABILITATION | Age: 37
End: 2022-09-19
Attending: INTERNAL MEDICINE
Payer: MEDICAID

## 2022-09-21 ENCOUNTER — APPOINTMENT (OUTPATIENT)
Dept: PHYSICAL THERAPY | Facility: REHABILITATION | Age: 37
End: 2022-09-21
Attending: INTERNAL MEDICINE
Payer: MEDICAID

## 2022-09-26 ENCOUNTER — PHYSICAL THERAPY (OUTPATIENT)
Dept: PHYSICAL THERAPY | Facility: REHABILITATION | Age: 37
End: 2022-09-26
Attending: INTERNAL MEDICINE
Payer: MEDICAID

## 2022-09-26 DIAGNOSIS — M25.569 KNEE PAIN, UNSPECIFIED CHRONICITY, UNSPECIFIED LATERALITY: ICD-10-CM

## 2022-09-26 PROCEDURE — 97110 THERAPEUTIC EXERCISES: CPT

## 2022-09-26 NOTE — OP THERAPY DAILY TREATMENT
Outpatient Physical Therapy  DAILY TREATMENT     [facility] Therapy [address] [facility], [address]  [place] [address]  Phone:  [phone]    Date: 09/26/2022    Patient: Vanessa Crenshaw  YOB: 1985  MRN: [id number]     Time Calculation    Start time: 1015  Stop time: 1100 Time Calculation (min): 45 minutes         Chief Complaint: knee pain   Visit #: 2    SUBJECTIVE:  Pt states she hasn't worked out in a few weeks and her knee hasn't been hurting. She has been walking her dog 2 miles without difficulty. She recently moved and she has stairs: which irritates her knee.     OBJECTIVE:          Therapeutic Exercises (CPT 15930):     1. Bike, 5 minute warm up    2. hip flexor stretching , kneeling    3. clam shells with BTB, 2 x 10    4. bridging, 2 x 10 , 3 x 1minute holds    5. side stepping with PTB    6. shuttle  5C DL 4C SL, 3 X 12    7. Reverse lunges with emphasis on keeping weight posterior, 2 x 10, mild crepitus PF joints: nonpainful    8. ball bridge with HS curls, 3 sets to fatique, core weakness limiting factor.    Time-based treatments/modalities:    Physical Therapy Timed Treatment Charges  Therapeutic exercise minutes (CPT 91330): 45 minutes    ASSESSMENT:   Response to treatment: pt tolerated treatment well with no PF aggravation. Pt would benefit from progressive core strengthening.     PLAN/RECOMMENDATIONS:   Plan for treatment: therapy treatment to continue next visit.  Planned interventions for next visit: neuromuscular re-education (CPT 03674) and therapeutic exercise (CPT 81341).

## 2022-09-28 ENCOUNTER — APPOINTMENT (OUTPATIENT)
Dept: PHYSICAL THERAPY | Facility: REHABILITATION | Age: 37
End: 2022-09-28
Attending: INTERNAL MEDICINE
Payer: MEDICAID

## 2022-10-03 ENCOUNTER — PHYSICAL THERAPY (OUTPATIENT)
Dept: PHYSICAL THERAPY | Facility: REHABILITATION | Age: 37
End: 2022-10-03
Attending: INTERNAL MEDICINE
Payer: MEDICAID

## 2022-10-03 DIAGNOSIS — M25.569 KNEE PAIN, UNSPECIFIED CHRONICITY, UNSPECIFIED LATERALITY: ICD-10-CM

## 2022-10-03 PROCEDURE — 97110 THERAPEUTIC EXERCISES: CPT

## 2022-10-03 PROCEDURE — 97112 NEUROMUSCULAR REEDUCATION: CPT

## 2022-10-03 NOTE — OP THERAPY DAILY TREATMENT
Outpatient Physical Therapy  DAILY TREATMENT     [facility] Therapy [address] [facility], [address]  [place] [address]  Phone:  [phone]    Date: 10/03/2022    Patient: Vanessa Crenshaw  YOB: 1985  MRN: [id number]     Time Calculation    Start time: 1015  Stop time: 1100 Time Calculation (min): 45 minutes         Chief Complaint:   Visit #: 3    SUBJECTIVE: able to walk and go up and down stairs without symptoms   Post workout right anterior knee pain , + hamstring curl when returning     OBJECTIVE:  Current objective measures:           Therapeutic Exercises (CPT 95817):     1. nu step L4, 5 minute warm up    2. hip flexor stretching , kneeling    3. clam shells with BTB, 2 x 10, reviewed    4. bridging, 2 x 10 , 3 x 1minute holds    5. side stepping with PTB, reviewed    6. shuttle 6c left/ 5C R DL 4C SLright/5 cords left, 3 X 12    7. Reverse lunges with emphasis on keeping weight posterior, 2 x 10, mild crepitus PF joints: nonpainful, miniml UE support    8. ball bridge with HS curls, 3 sets to fatique, reviewed verbally    Therapeutic Treatments and Modalities:     1. Neuromuscular Re-education (CPT 19267), PF joint mob right knee, CFM right patellar tendon , quad /hip flexor stretch right  Time-based treatments/modalities:    Physical Therapy Timed Treatment Charges  Neuromusc re-ed, balance, coor, post minutes (CPT 57494): 10 minutes  Therapeutic exercise minutes (CPT 55753): 30 minutes    ASSESSMENT:   Response to treatment: fear avoidance with returning to gym exercise,add single leg functional strength /eccentric quad strength next session    PLAN/RECOMMENDATIONS:   Plan for treatment: therapy treatment to continue next visit.  Planned interventions for next visit: continue with current treatment.

## 2022-10-05 ENCOUNTER — APPOINTMENT (OUTPATIENT)
Dept: PHYSICAL THERAPY | Facility: REHABILITATION | Age: 37
End: 2022-10-05
Attending: INTERNAL MEDICINE
Payer: MEDICAID

## 2022-10-06 ENCOUNTER — APPOINTMENT (OUTPATIENT)
Dept: PHYSICAL THERAPY | Facility: REHABILITATION | Age: 37
End: 2022-10-06
Attending: INTERNAL MEDICINE
Payer: MEDICAID

## 2022-10-10 ENCOUNTER — APPOINTMENT (OUTPATIENT)
Dept: PHYSICAL THERAPY | Facility: REHABILITATION | Age: 37
End: 2022-10-10
Attending: INTERNAL MEDICINE
Payer: MEDICAID

## 2022-10-12 ENCOUNTER — APPOINTMENT (OUTPATIENT)
Dept: PHYSICAL THERAPY | Facility: REHABILITATION | Age: 37
End: 2022-10-12
Attending: INTERNAL MEDICINE
Payer: MEDICAID

## 2022-10-17 ENCOUNTER — APPOINTMENT (OUTPATIENT)
Dept: PHYSICAL THERAPY | Facility: REHABILITATION | Age: 37
End: 2022-10-17
Attending: INTERNAL MEDICINE
Payer: MEDICAID

## 2022-10-19 ENCOUNTER — APPOINTMENT (OUTPATIENT)
Dept: PHYSICAL THERAPY | Facility: REHABILITATION | Age: 37
End: 2022-10-19
Attending: INTERNAL MEDICINE
Payer: MEDICAID

## 2022-10-20 ENCOUNTER — TELEPHONE (OUTPATIENT)
Dept: PHYSICAL THERAPY | Facility: REHABILITATION | Age: 37
End: 2022-10-20
Payer: MEDICAID

## 2022-10-20 NOTE — OP THERAPY PROGRESS SUMMARY
Outpatient Physical Therapy  Progress  NOTE      [facility] Therapy [address] [facility], [address]  [place] [address]  Phone:  [phone]    Date of Visit: 10/20/2022    Patient: Vanessa Crenshaw  YOB: 1985  MRN: [id number]     Referring Provider: Vanessa Carlson M.D.   Referring Diagnosis  Knee pain, unspecified chronicity, unspecified laterality        Rehab Potential: good    Progress Report Period: 8/10/2022-10/20/22    Functional Assessment Used          Objective Findings and Assessment:   Patient progression towards goals: Short Term Goals:   --pt sits with minor burning at most in thigh and knee for work funciton MET  -pt walks without pain during her inclines; calms within same day vs next day MET  -pt with improved glute strength to 4/5 or greater to reduce knee strain with functional movements NT      Long Term Goals:    -pt sits with no sxs for work function partially met  -pt walks without pain during her inclines; sxs calm 15 min after partially MET  -pt with improved glute strength to 4+/5 or greater to reduce knee strain with functional movements Partially MET    Objective findings and assessment details: Patient has been seen for initial evaluation and 2 follow up appointments with positive improvements functionally.  Patient is able to go up and down stairs without symptoms.  Patient is able to walk 2 miles without symptom per daily treatment note.   Current symptoms:  post workout anterior knee pain with tenderness right patellar tendon(per daily treatment notes) mild crepitus bilateral P-F joints per daily treatment notes  Patient demonstrates + fear  of going to gym/working out and having onset of symptoms.  Strength: 4-/5 bilateral hip extension and abduction, 4-/5 quad right, 4+/5 left  Impaired neuromuscular control with single leg squat right >left  Recommend continued PT to meet goals stated below.         Goals:   Short Term Goals:   Patient  able to go up and down stairs without railing consistently without symptoms  Patient able to exercise at gym and perform HEP without symptoms post exrecise > 50% of the time  Short term goal time span:  2-4 weeks      Long Term Goals:    Patient able to perform gym program unrestricted and without bilateral knee symptoms > 75% of the time  Patient able to demonstrate normal neuromuscular control with single leg squat and step down bilateral LE  Patient able to complete all ADLS and activities without restriction or symptoms  Long term goal time span:  6-8 weeks    Plan:   Planned therapy interventions:  Neuromuscular Re-education (CPT 93534), Manual Therapy (CPT 10239), Gait Training (CPT 76807), E Stim Unattended (CPT 48242), Therapeutic Exercise (CPT 36201) and Therapeutic Activities (CPT 69998)  Frequency:  2x week  Duration in weeks:  8  Plan details:  1-2 x week x 8 weeks    Treatment codes:  2 there ex(44912) , 1 neuromuscular re ed(10820), 1 manual (45529)      Referring provider co-signature:  I have reviewed this plan of care and my co-signature certifies the need for services.     Certification Period: 10/20/2022 to 12/19/22    Physician Signature: ________________________________ Date: ______________

## 2022-10-24 ENCOUNTER — APPOINTMENT (OUTPATIENT)
Dept: PHYSICAL THERAPY | Facility: REHABILITATION | Age: 37
End: 2022-10-24
Attending: INTERNAL MEDICINE
Payer: MEDICAID

## 2022-10-26 ENCOUNTER — APPOINTMENT (OUTPATIENT)
Dept: PHYSICAL THERAPY | Facility: REHABILITATION | Age: 37
End: 2022-10-26
Attending: INTERNAL MEDICINE
Payer: MEDICAID

## 2022-10-31 ENCOUNTER — APPOINTMENT (OUTPATIENT)
Dept: PHYSICAL THERAPY | Facility: REHABILITATION | Age: 37
End: 2022-10-31
Payer: MEDICAID

## 2022-11-02 ENCOUNTER — APPOINTMENT (OUTPATIENT)
Dept: PHYSICAL THERAPY | Facility: REHABILITATION | Age: 37
End: 2022-11-02
Attending: INTERNAL MEDICINE
Payer: MEDICAID

## 2022-11-07 ENCOUNTER — APPOINTMENT (OUTPATIENT)
Dept: PHYSICAL THERAPY | Facility: REHABILITATION | Age: 37
End: 2022-11-07
Attending: INTERNAL MEDICINE
Payer: MEDICAID

## 2022-11-30 ENCOUNTER — HOSPITAL ENCOUNTER (OUTPATIENT)
Dept: RADIOLOGY | Facility: MEDICAL CENTER | Age: 37
End: 2022-11-30
Attending: NURSE PRACTITIONER
Payer: MEDICAID

## 2022-11-30 DIAGNOSIS — M25.561 RIGHT KNEE PAIN, UNSPECIFIED CHRONICITY: ICD-10-CM

## 2022-11-30 PROCEDURE — 73721 MRI JNT OF LWR EXTRE W/O DYE: CPT | Mod: RT

## 2022-12-02 ENCOUNTER — PHYSICAL THERAPY (OUTPATIENT)
Dept: PHYSICAL THERAPY | Facility: MEDICAL CENTER | Age: 37
End: 2022-12-02
Attending: INTERNAL MEDICINE
Payer: MEDICAID

## 2022-12-02 DIAGNOSIS — M25.561 RIGHT KNEE PAIN, UNSPECIFIED CHRONICITY: ICD-10-CM

## 2022-12-02 PROCEDURE — 97162 PT EVAL MOD COMPLEX 30 MIN: CPT

## 2022-12-02 ASSESSMENT — ENCOUNTER SYMPTOMS
QUALITY: KNIFE-LIKE
QUALITY: CRAMPING
QUALITY: STRETCHING SENSATION
QUALITY: THROBBING

## 2022-12-02 NOTE — OP THERAPY EVALUATION
Outpatient Physical Therapy  INITIAL EVALUATION    [facility] Outpatient Physical Therapy  [address]  [facility] [address]  Phone:  [phone]  Fax:  [phone]    Date of Evaluation: 12/02/2022    Patient: Vanessa Crenshaw  YOB: 1985  MRN: [id number]     Referring Provider: Vanessa Carlson M.D.  [address] [place] [id number]   Referring Diagnosis Pain in right knee [M25.561]     Time Calculation    1031 1120 49 min         Chief Complaint: Knee Problem      Visit Diagnoses     ICD-10-CM   1. Right knee pain, unspecified chronicity  M25.561       Date of onset of impairment: Multiple active episodes found    Subjective:   History of Present Illness:     Mechanism of injury:  Patient is a 37 year old female with a PMH including: Rib fractures and pulmonary contusion, traumatic cephalohematoma, t/s fracture, asthma, anxiety, dilation and curettage (2021). Pt reporting has had chronic R knee issues from 2015 with soreness in the knee. No clear CT in the past.   She was seen in PT 6/2019-6/21/22 and again 8/10/22-10/3/22 before fall.    Pt presents to therapy with complaints of Chronic R knee pain from old injury where she fell onto knee onto hardwood when trying to step over baby gate a week before Halloween; had improvements with PT in the past. Pt reporting she notices she is limping; endorses numbness and swelling. Denies instability, locking, popping, tingling. Of note, pt did have fall recently due to slipping when seeing her primary which increased s/s.  Endorses cramping in the calf with prolonged walking. A few days ago endorses electric shock at superior and anterior knee. Notices tingling in anterior knee and during day (without ability to pinpoint activities). Pt reporting she has returned to the gym on TM x 10 min intervals and feels tightness in calf with walking.         Prior level of function:  Knee issues in past that  improved but not resolved; reagitated with fall  Pain:     Location:  Calf and anterior R knee    Quality:  Knife-like, stretching sensation, cramping and throbbing (odd sensation)    Pain Comments::  Aggravating: prolonged sitting  Prolonged walking-cramping in calf>1/2 mile or 10 min (sits on recliner <1 hour), ascending stairs, mopping     Relieving: sitting and resting  Social Support:     Lives in:  Apartment (stairs)  Diagnostic Tests:     Diagnostic Tests Comments:  11/30/22 R knee MRI:     FINDINGS:     Cruciate ligaments: Intact.     Collateral ligaments: Intact.     Extensor mechanism: Quadriceps and patellar tendons are unremarkable.     Menisci: No meniscal tear, parameniscal cyst, or displaced meniscal fragment is identified.     Osteochondral structures: Mild degenerative marrow edema is again noted within the central tibial plateau. There is no MR evidence of osteonecrosis, contusion, or fracture. There is no focal full-thickness chondral defect. Mild cartilage thinning and   irregularity is noted within the median ridge patella. Degenerative subchondral marrow edema is noted within the median ridge patella.     Other abnormalities: There is a physiologic amount of joint fluid.     IMPRESSION:        1.  No evidence of acute internal derangement.  2.  Mild chondromalacia patella, stable.    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)  Hobbies: going to gym  Exercise: No current exercise routine in place; walks with dog 10 min;  prior to fall 2-3 miles per week and at gym 30 min on TM at incline, elliptical, leg presses, machines (few times per week)    ADL's: Indep with ADL's      Patient Goals:     Other patient goals:  Return to gym    Past Medical History:   Diagnosis Date    Asthma     Dental disorder     upper denture    Psychiatric problem      anxiety     Past Surgical History:   Procedure Laterality Date    HYSTEROSCOPY WITH MYOSURE  4/21/2021    Procedure: HYSTEROSCOPY;  Surgeon: Perla Rehman M.D.;  Location: SURGERY SAME DAY [facility];  Service: Gynecology    DILATION AND CURETTAGE  4/21/2021    Procedure: DILATION AND CURETTAGE.;  Surgeon: Perla Rehman M.D.;  Location: SURGERY SAME DAY [facility];  Service: Gynecology    ABDOMINAL EXPLORATION       Social History     Tobacco Use    Smoking status: Every Day     Packs/day: 1.00     Types: Cigarettes    Smokeless tobacco: Never   Substance Use Topics    Alcohol use: Yes     Comment: 6 week     Family and Occupational History     Socioeconomic History    Marital status: Single     Spouse name: Not on file    Number of children: Not on file    Years of education: Not on file    Highest education level: Not on file   Occupational History    Not on file       Objective     Neurological Testing     Dermatome testing   Lumbar (left)   All left lumbar dermatomes intact    Lumbar (right)   All right lumbar dermatomes intact    Palpation     Additional Palpation Details  TTP lateral R joint line  Odd sensation and tenderness at tibial plateau R  No TTP quad tendon R     Active Range of Motion     Lumbar   All lumbar active range of motion within functional limits    Additional Active Range of Motion Details  -2 - 124 deg R knee ROM  Hip ROM IR 19 deg (WFL ER) L hip IR and ER WFL   *tested in 90/90 position        Strength:      Additional Strength Details  Squat WFL  Hip abd and add 3+ while in bridge  Full range bridge DL  3/4 AROM SL bridge R; WFL on L     Tests       Lumbar spine (left)      Negative slump.   Lumbar spine (right)     Negative slump.       Therapeutic Exercises (CPT 46609):     1. pt edu, PT exam findings    2. new hep as below      Therapeutic Exercise Summary: Access Code: [email or web address]  URL: [email or web address]  Date: 12/02/2022  Prepared by: Kelvin  Marlin    Exercises  Bridge with Hip Abduction and Resistance - Ground Touches - 2 x daily - 7 x weekly - 2 sets - 10-15 reps  Marching Bridge - 2 x daily - 7 x weekly - 2 sets - 10-15 reps      Time-based treatments/modalities:           Assessment, Response and Plan:   Impairments: abnormal or restricted ROM, activity intolerance, impaired physical strength, lacks appropriate home exercise program and pain with function    Assessment details:  Patient is a pleasant and cooperative 37 year old female with PMH remarkable for chronic R knee pain without clear CT. She did receive PT with improvement and had flare up following GLF onto hardwood onto R knee. Recent MRI imaging (see above); unremarkable for internal derangement or fractures. No red flags. Exam findings suggestive of proximal pelvic girdle weakness R>L, slight swelling at medial R knee and limited R hip IR ROM. Neural and l/s screen negative today. Pt may benefit from skilled physical therapy in order to address above impairments in order to improve QOL and return to reported ADL's.     Goals:   Short Term Goals:   1. Pt will be independent with written HEP.  2. Pt will be able to progress 1/3 of hep.  3. Therapist to assess calf strength and endurance.  Short term goal time span:  2-4 weeks      Long Term Goals:    1. Pt will be independent with written HEP.  2. Pt will have a sig improvement in WOMAC score >/= MDC (eval: 25)  3. Pt will be able to return to gym for LE workouts with min to no modifications at least 3x/wk using weight machines to improve QOL.  4. Pt will be able to navigate stairs with step through gait pattern and no incr in s/s at least 70% of the time or greater.       Long term goal time span:  6-8 weeks    Plan:   Therapy options:  Physical therapy treatment to continue  Planned therapy interventions:  Therapeutic Exercise (CPT 39663) and Neuromuscular Re-education (CPT 42757)  Frequency: 1-2x/wk.  Duration in visits:   12  Discussed with:  Patient  Plan details:  UPOC: 2/17/23    *POC extended to allot for lag in auth time    3x97110 and 1x97112 per visit for 12 visits    Functional Assessment Used        Referring provider co-signature:  I have reviewed this plan of care and my co-signature certifies the need for services.    Certification Period: 12/02/2022 to  2/17/23    Physician Signature: ________________________________ Date: ______________

## 2023-01-18 ENCOUNTER — PHYSICAL THERAPY (OUTPATIENT)
Dept: PHYSICAL THERAPY | Facility: MEDICAL CENTER | Age: 38
End: 2023-01-18
Attending: INTERNAL MEDICINE
Payer: MEDICAID

## 2023-01-18 DIAGNOSIS — M25.569 KNEE PAIN, UNSPECIFIED CHRONICITY, UNSPECIFIED LATERALITY: ICD-10-CM

## 2023-01-18 DIAGNOSIS — M25.561 RIGHT KNEE PAIN, UNSPECIFIED CHRONICITY: ICD-10-CM

## 2023-01-18 PROCEDURE — 97110 THERAPEUTIC EXERCISES: CPT

## 2023-01-18 NOTE — OP THERAPY DAILY TREATMENT
Outpatient Physical Therapy  DAILY TREATMENT     [facility] Outpatient Physical Therapy  [address]  [place] [address]  Phone:  [phone]  Fax:  [phone]    Date: 01/18/2023    Patient: Vanessa Crenshaw  YOB: 1985  MRN: [id number]     Time Calculation    Start time: 1315  Stop time: 1358 Time Calculation (min): 43 minutes         Chief Complaint: Knee Problem    Visit #: 2    SUBJECTIVE:  Pt reporting she is completing walking 30 min-has not noticed cramping in the calf.  Notices tightness with anterior knee after prolonged sitting. Pt has been completing her exercises 5x/week.       OBJECTIVE:  Current objective measures:   Today:  Calf strength/endurance: Tightness initiating on R calf after 5-6 resps; fatigue after 25 reps     Modified lester test: tightness B hip flexors and quads; onset of n/t at knee       From eval:  Additional Active Range of Motion Details  -2 - 124 deg R knee ROM    Strength:    Core endurance 1 min 50 sec endurance     Additional Strength Details  Squat WFL  Hip abd and add 3+ while in bridge  Full range bridge DL  3/4 AROM SL bridge R; WFL on L        Therapeutic Exercises (CPT 00597):     1. pt edu, PT exam findings    2. new hep as below    3. upright bike, x3 min, cardiovascular warm up; fatigue    4. ball bridge, 1x 2 min, able to maintain 1 min 50 sec before fatigue; hep    5. toy soldier, 2x endurance, hep; fatigue    7. SL bridge march, x10, reviewed    8. bridge+clamshells, x10 blue TB, progressed resistance; reviewed    9. SL calf raises, 25x ea      Therapeutic Exercise Summary: Access Code: [email or web address]  URL: [email or web address]  Date: 12/02/2022  Prepared by: Kelvin Isaac    Exercises  Bridge with Hip Abduction and Resistance - Ground Touches - 2 x daily - 7 x weekly - 2 sets - 10-15 reps  Marching Bridge - 2 x daily - 7 x weekly - 2 sets - 10-15 reps      Time-based  treatments/modalities:    Physical Therapy Timed Treatment Charges  Therapeutic exercise minutes (CPT 49703): 43 minutes      Pain rating (1-10) before treatment: 0/10 R knee pain   Pain rating (1-10) after treatment:    Pain Comments::  Aggravating: prolonged sitting  Prolonged walking-cramping in calf>1/2 mile or 10 min (sits on recliner <1 hour), ascending stairs, mopping        ASSESSMENT:   Response to treatment:   Good carryover of hep from initial eval. Reporting s/s of cramping have reduced since initial eval with amb. Demo calf weakness and lower endurance RLE compared to L with hip flexor and quad tightness B; reproductive of n/t with modified lester testing. Initiation of hip flexor stretching and updated to hep. Pt will benefit from continued stretching to ant hip and proximal pelvic girdle strengthening. Will additionally initiate eccentric calf strengthening in subsequent sessions.       PLAN/RECOMMENDATIONS:   Plan for treatment: continue with treatment.  Planned interventions for next visit: continue with current POC.  Review hep for compliance and understanding   3x97110 and 1x97112 per visit for 12 visits

## 2023-01-23 ENCOUNTER — PHYSICAL THERAPY (OUTPATIENT)
Dept: PHYSICAL THERAPY | Facility: MEDICAL CENTER | Age: 38
End: 2023-01-23
Attending: INTERNAL MEDICINE
Payer: MEDICAID

## 2023-01-23 DIAGNOSIS — M25.561 RIGHT KNEE PAIN, UNSPECIFIED CHRONICITY: ICD-10-CM

## 2023-01-23 DIAGNOSIS — M25.569 KNEE PAIN, UNSPECIFIED CHRONICITY, UNSPECIFIED LATERALITY: ICD-10-CM

## 2023-01-23 PROCEDURE — 97110 THERAPEUTIC EXERCISES: CPT

## 2023-01-23 NOTE — OP THERAPY DAILY TREATMENT
Outpatient Physical Therapy  DAILY TREATMENT     [facility] Outpatient Physical Therapy  [address]  [place] [address]  Phone:  [phone]  Fax:  [phone]    Date: 01/23/2023    Patient: Vanessa Crenshaw  YOB: 1985  MRN: [id number]     Time Calculation    Start time: 1032  Stop time: 1115 Time Calculation (min): 43 minutes         Chief Complaint: Knee Problem    Visit #: 3    SUBJECTIVE:  Pt reporting she fell on ice while walking into work. States she has had tingling peripatellar and down shin since the fall. Fell on bottom; did not impact head and did not hit knee. States she fell down hard and fast and was able to stand immediately afterwards.       OBJECTIVE:  Current objective measures:   Today:  Slump negative         Therapeutic Exercises (CPT 27689):     1. pt edu, PT exam findings    2. new hep as below    3. upright bike, x3 min, cardiovascular warm up; fatigue    4. hip flexor stretch+PPT, reviewed    11. heel raises+BFR straps, x3 min; x 2 min, 1 min rest break in b/w sets      Therapeutic Exercise Summary: Access Code: [email or web address]  URL: [email or web address]  Date: 12/02/2022  Prepared by: Kelvin Isaac    Exercises  Bridge with Hip Abduction and Resistance - Ground Touches - 2 x daily - 7 x weekly - 2 sets - 10-15 reps  Marching Bridge - 2 x daily - 7 x weekly - 2 sets - 10-15 reps      Therapeutic Treatments and Modalities:     1. Neuromuscular Re-education (CPT 08645), see below    Therapeutic Treatment and Modalities Summary: Neuro re-ed:   IASTM to shin R and lateral R knee, distal and mid lateral thigh //abolished n/t at R knee and thigh   Time-based treatments/modalities:    Physical Therapy Timed Treatment Charges  Therapeutic exercise minutes (CPT 20801): 43 minutes      Pain rating (1-10) before treatment: R lateral knee and thigh n/t  Pain rating (1-10) after treatment:  0/10 pain and n/t  Pain Comments::   Aggravating: prolonged sitting  Prolonged walking-cramping in calf>1/2 mile or 10 min (sits on recliner <1 hour), ascending stairs, mopping        ASSESSMENT:   Response to treatment:   Pt presenting to therapy today s/p fall on ice. No red flags warranting immediate imaging. Did present with n/t at lateral R knee and shin that were resolved following IASTM. Introduction of BFR training to encourage additional mobility at knee and strengthening to calf. Pt demo normal fatigue to txt without negative side effects. Will review current hep for compliance next session to ensure carryover.    PLAN/RECOMMENDATIONS:   Plan for treatment: continue with treatment.  Planned interventions for next visit: continue with current POC.  Review hep for compliance and understanding-toy soldier    3x97110 and 1x97112 per visit for 12 visits

## 2023-01-25 ENCOUNTER — PHYSICAL THERAPY (OUTPATIENT)
Dept: PHYSICAL THERAPY | Facility: MEDICAL CENTER | Age: 38
End: 2023-01-25
Attending: INTERNAL MEDICINE
Payer: MEDICAID

## 2023-01-25 DIAGNOSIS — M25.561 RIGHT KNEE PAIN, UNSPECIFIED CHRONICITY: ICD-10-CM

## 2023-01-25 DIAGNOSIS — M25.569 KNEE PAIN, UNSPECIFIED CHRONICITY, UNSPECIFIED LATERALITY: ICD-10-CM

## 2023-01-25 PROCEDURE — 97112 NEUROMUSCULAR REEDUCATION: CPT

## 2023-01-25 PROCEDURE — 97110 THERAPEUTIC EXERCISES: CPT

## 2023-01-25 NOTE — OP THERAPY DAILY TREATMENT
"  Outpatient Physical Therapy  DAILY TREATMENT     [facility] Outpatient Physical Therapy  [address]  [place] [address]  Phone:  [phone]  Fax:  [phone]    Date: 01/25/2023    Patient: Vanessa Crenshaw  YOB: 1985  MRN: [id number]     Time Calculation    Start time: 0946  Stop time: 1029 Time Calculation (min): 43 minutes         Chief Complaint: Knee Problem    Visit #: 4    SUBJECTIVE:  Pt reporting abolished n/t in LE since BFR and IASTM txt from last session.      OBJECTIVE:  Current objective measures:   Today:  Slump negative         Therapeutic Exercises (CPT 83344):     1. pt edu, PT exam findings    2. new hep as below    4. hip flexor stretch+PPT, 1x 2 min, reviewed; good carryover    5. toy soldier, 2x endurance, reviewed; VC's to relax knees and avoid hyperextension    7. SL bridge march, x10, NT    8. bridge+clamshells, x10 blue TB, NT    9. standing heel raises, x20 x10 at bar    12. BFR+upright bike, x2.5 min x 2    13. resisted ankle DF and eversion, added to hep      Therapeutic Exercise Summary: Pt performed these exercises with instruction and SPV.  Provided handout with these exercises for daily HEP.        Therapeutic Treatments and Modalities:     1. Neuromuscular Re-education (CPT 56060), x8 min    Therapeutic Treatment and Modalities Summary: Neuro re-ed:   Manual isometrics to ankle DF and ever 7x5 sec with pt in hooklying  Time-based treatments/modalities:    Physical Therapy Timed Treatment Charges  Neuromusc re-ed, balance, coor, post minutes (CPT 98932): 8 minutes  Therapeutic exercise minutes (CPT 52719): 35 minutes      Pain rating (1-10) before treatment: 0/10; \"when brushing the skin\" 6/10   Pain rating (1-10) after treatment:  0/10 pain and n/t  Pain Comments::  Aggravating: prolonged sitting  Prolonged walking-cramping in calf>1/2 mile or 10 min (sits on recliner <1 hour), ascending stairs, mopping    "     ASSESSMENT:   Response to treatment:   Pt responding well to IASTM and BFR training from last session; presenting to therapy today without n/t-this has been abolished. Continues to have hypersensitivity at skin at region of tib anterior and everters -completed isometrics and BFR repeat to assist with decreasing pain at this area. Will progress isometrics to increased resistance at next session if hypersensitivity reduced.       PLAN/RECOMMENDATIONS:   Plan for treatment: continue with treatment.  Planned interventions for next visit: continue with current POC.     3x97110 and 1x97112 per visit for 12 visits

## 2023-01-30 ENCOUNTER — PHYSICAL THERAPY (OUTPATIENT)
Dept: PHYSICAL THERAPY | Facility: MEDICAL CENTER | Age: 38
End: 2023-01-30
Attending: INTERNAL MEDICINE
Payer: MEDICAID

## 2023-01-30 DIAGNOSIS — M25.569 KNEE PAIN, UNSPECIFIED CHRONICITY, UNSPECIFIED LATERALITY: ICD-10-CM

## 2023-01-30 DIAGNOSIS — M25.561 RIGHT KNEE PAIN, UNSPECIFIED CHRONICITY: ICD-10-CM

## 2023-01-30 PROCEDURE — 97110 THERAPEUTIC EXERCISES: CPT

## 2023-01-30 PROCEDURE — 97112 NEUROMUSCULAR REEDUCATION: CPT

## 2023-01-30 NOTE — OP THERAPY DAILY TREATMENT
Outpatient Physical Therapy  DAILY TREATMENT     [facility] Outpatient Physical Therapy  [address]  [place] [address]  Phone:  [phone]  Fax:  [phone]    Date: 01/30/2023    Patient: Vanessa Crenshaw  YOB: 1985  MRN: [id number]     Time Calculation    Start time: 0903  Stop time: 0943 Time Calculation (min): 40 minutes         Chief Complaint: Knee Problem    Visit #: 5    SUBJECTIVE:  Pt reporting she had cramping after last session and rested. Pt reporting she still feels tenderness in the anterior shin.       OBJECTIVE:  Current objective measures:   Pelvic hip drop during SL bridge     Therapeutic Exercises (CPT 74955):     1. pt edu, PT exam findings    2. new hep as below    4. hip flexor stretch+PPT, 1x 2 min, reviewed; good carryover    5. toy soldier, 2x endurance, reviewed; VC's to relax knees and avoid hyperextension    7. SL bridge march, x10, reviewed; corrected with cone on stomach with VC's to maintain stable pelvis    8. bridge+clamshells, x10 blue TB, NT    9. standing heel raises, x20 x10 at bar    12. BFR+upright bike, x2 rounds to fatigue, initially strap at prox R hip and then at prox R calf    13. resisted ankle DF and eversion isometrics, 5x 5 sec, reviewed; poor carryover    15. ankle DF dynamic stretching on wedge    16. pt education, re: pool therapy benefits      Therapeutic Exercise Summary: Pt performed these exercises with instruction and SPV.  Provided handout with these exercises for daily HEP.        Therapeutic Treatments and Modalities:     1. Neuromuscular Re-education (CPT 23684), x5 min    Therapeutic Treatment and Modalities Summary: Neuro re-ed:   Manual isometrics to ankle DF and ever 7x5 sec with pt in hooklying  Time-based treatments/modalities:    Physical Therapy Timed Treatment Charges  Neuromusc re-ed, balance, coor, post minutes (CPT 28460): 5 minutes  Therapeutic exercise minutes (CPT 13313):  "35 minutes      Pain rating (1-10) before treatment: 0/10; \"when brushing the skin\" 6/10   Pain rating (1-10) after treatment:  0/10 pain and n/t  Pain Comments::  Aggravating: prolonged sitting  Prolonged walking-cramping in calf>1/2 mile or 10 min (sits on recliner <1 hour), ascending stairs, mopping        ASSESSMENT:   Response to treatment:   Poor carryover of ankle isometrics-pt candid and did not complete due to fatigue following last session. States she continues to have anterior shin discomfort corwin with prolonged walking. Will review and progress ankle isometrics next session and return to heel raise training for endurance training at calves.    PLAN/RECOMMENDATIONS:   Plan for treatment: continue with treatment.  Planned interventions for next visit: continue with current hep  Review SL bridge with cone  Progress hep if able  Calf strength and endurance training     3x97110 and 1x97112 per visit for 12 visits    "

## 2023-02-01 ENCOUNTER — PHYSICAL THERAPY (OUTPATIENT)
Dept: PHYSICAL THERAPY | Facility: MEDICAL CENTER | Age: 38
End: 2023-02-01
Attending: INTERNAL MEDICINE
Payer: MEDICAID

## 2023-02-01 DIAGNOSIS — M25.569 KNEE PAIN, UNSPECIFIED CHRONICITY, UNSPECIFIED LATERALITY: ICD-10-CM

## 2023-02-01 DIAGNOSIS — M25.561 RIGHT KNEE PAIN, UNSPECIFIED CHRONICITY: ICD-10-CM

## 2023-02-01 PROCEDURE — 97110 THERAPEUTIC EXERCISES: CPT

## 2023-02-01 NOTE — OP THERAPY DAILY TREATMENT
"  Outpatient Physical Therapy  DAILY TREATMENT     [facility] Outpatient Physical Therapy  [address]  [place] [address]  Phone:  [phone]  Fax:  [phone]    Date: 02/01/2023    Patient: Vanessa Crenshaw  YOB: 1985  MRN: [id number]     Time Calculation    Start time: 0946  Stop time: 1026 Time Calculation (min): 40 minutes         Chief Complaint: Knee Problem    Visit #: 6    SUBJECTIVE:  Pt reporting she had pain at medial knee ant posterior thigh x 2 days after last session, presenting today with 0/10 pain. Went to gym today and did TM, was unable to do inclines since injury b/c bothered knee.     OBJECTIVE:  Current objective measures:   TTP soleus and post tib R   +femoral n. Tension test R (tested SL)  Modified lester stretch: tightness at quads and hip flexors B    Therapeutic Exercises (CPT 90603):     1. pt edu, PT exam findings    2. new hep as below    4. hip flexor stretch+PPT->progressed to include quad stretch, 1x 2 min, reviewed; good carryover    6. calf raises, DC from hep    12. upright bike, x5 min, cardiovascular warm up    13. resisted ankle DF and eversion isometrics, 5x 5 sec, reviewed; poor carryover    14. modified lester stretching, x15 dynamic stretch B, tightness R with impaired quality of motion compared to L; hep    15. femoral n. glides in prone, x15 ea, hep    16. pt education, re: nerve anatomy; nerve health (bloodflow, space, motion, etc)      Therapeutic Exercise Summary: Pt performed these exercises with instruction and SPV.  Provided handout with these exercises for daily HEP.        Time-based treatments/modalities:    Physical Therapy Timed Treatment Charges  Therapeutic exercise minutes (CPT 92504): 40 minutes      Pain rating (1-10) before treatment: 0/10; \"when brushing the skin\" 6/10   Pain rating (1-10) after treatment:  0/10 pain and n/t  Pain Comments::  Aggravating: prolonged sitting  Prolonged " walking-cramping in calf>1/2 mile or 10 min (sits on recliner <1 hour), ascending stairs, mopping        ASSESSMENT:   Response to treatment:   Temporary DC calf raises due to increase in cramping and pain since last session. Pos femoral n. Tension testing today-this may be new onset since recent fall on ice. Addition of n. Glides to hep and gentle stretching to anterior hips and quads. Will evaluate progress next session and return to gentle strengthening; may need to strengthen calves in unloaded position if continuing to produce pain in axial loaded positions.       PLAN/RECOMMENDATIONS:   Plan for treatment: continue with treatment.  Planned interventions for next visit: continue with current hep  Assess response to femoral n. Glides and stretching     3x97110 and 1x97112 per visit for 12 visits

## 2023-02-06 ENCOUNTER — APPOINTMENT (OUTPATIENT)
Dept: PHYSICAL THERAPY | Facility: MEDICAL CENTER | Age: 38
End: 2023-02-06
Attending: INTERNAL MEDICINE
Payer: MEDICAID

## 2023-02-08 ENCOUNTER — PHYSICAL THERAPY (OUTPATIENT)
Dept: PHYSICAL THERAPY | Facility: MEDICAL CENTER | Age: 38
End: 2023-02-08
Attending: INTERNAL MEDICINE
Payer: MEDICAID

## 2023-02-08 DIAGNOSIS — M25.569 KNEE PAIN, UNSPECIFIED CHRONICITY, UNSPECIFIED LATERALITY: ICD-10-CM

## 2023-02-08 DIAGNOSIS — M25.561 RIGHT KNEE PAIN, UNSPECIFIED CHRONICITY: ICD-10-CM

## 2023-02-08 PROCEDURE — 97110 THERAPEUTIC EXERCISES: CPT

## 2023-02-08 NOTE — OP THERAPY DAILY TREATMENT
"  Outpatient Physical Therapy  DAILY TREATMENT     [facility] Outpatient Physical Therapy  [address]  [place] [address]  Phone:  [phone]  Fax:  [phone]    Date: 02/08/2023    Patient: Vanessa Crenshaw  YOB: 1985  MRN: [id number]     Time Calculation    Start time: 1031  Stop time: 1112 Time Calculation (min): 41 minutes         Chief Complaint: Knee Problem    Visit #: 7    SUBJECTIVE:  Pt has increased speed on TM; has progressed her ex using leg weight machines.   Was able to complete three sets of calf raises DL without cramping.     OBJECTIVE:  Current objective measures:   TTP soleus and post tib R   +femoral n. Tension test R (tested SL)  Modified lester stretch: tightness at quads and hip flexors B    Therapeutic Exercises (CPT 38886):     1. pt edu, PT exam findings    2. new hep as below    3. inclined TM walk, L3 incline; 2 mph x 4 min; L5 incline x1 min, cardiovascular warm up    4. hip flexor stretch+PPT->progressed to include quad stretch, 1x 2 min, reviewed; good carryover    5. dynamic calf stretching on wedge, x20    6. calf raises, DC from hep    13. resisted ankle DF and eversion isometrics, 5x 5 sec, reviewed; poor carryover    14. quad stretching in standing using table, x10 ea with PPT    15. femoral n. glides in prone, x15 ea, reviewed; good carryover    16. calf raises, 20x ea    17. wall sits, 2x60 sec, slight onset of tingling R quad    18. squats, 2x10, incr tibial translation B with instability      Therapeutic Exercise Summary: Pt performed these exercises with instruction and SPV.  Provided handout with these exercises for daily HEP.        Time-based treatments/modalities:    Physical Therapy Timed Treatment Charges  Therapeutic exercise minutes (CPT 30929): 41 minutes      Pain rating (1-10) before treatment: 0/10; \"when brushing the skin\" 6/10   Pain rating (1-10) after treatment:  0/10 pain and n/t  Pain " Comments::  Aggravating: prolonged sitting  Prolonged walking-cramping in calf>1/2 mile or 10 min (sits on recliner <1 hour), ascending stairs, mopping      Goals:   Short Term Goals:   1. Pt will be independent with written HEP.  2. Pt will be able to progress 1/3 of hep.  3. Therapist to assess calf strength and endurance.  Short term goal time span:  2-4 weeks      Long Term Goals:    1. Pt will be independent with written HEP.  2. Pt will have a sig improvement in WOMAC score >/= MDC (eval: 25)  3. Pt will be able to return to gym for LE workouts with min to no modifications at least 3x/wk using weight machines to improve QOL.  4. Pt will be able to navigate stairs with step through gait pattern and no incr in s/s at least 70% of the time or greater.         Long term goal time span:  6-8 weeks    ASSESSMENT:   Response to treatment:   Able to relieve numbness/tingling in anterior R thigh following femoral n. Glides in prone. Fatigue following incline walking on TM without cramping and incr tibial translation B with instability; this improved with demo and cuing with cuing to 45 deg knee flexion for increased knee surface area. Will assess progress and goals next session to determine additional needs for PT.       PLAN/RECOMMENDATIONS:   Plan for treatment: continue with treatment.  Planned interventions for next visit: continue with current hep  Assess stair navigation     [id number] and [id number] per visit for 12 visits

## 2023-02-13 ENCOUNTER — PHYSICAL THERAPY (OUTPATIENT)
Dept: PHYSICAL THERAPY | Facility: MEDICAL CENTER | Age: 38
End: 2023-02-13
Attending: INTERNAL MEDICINE
Payer: MEDICAID

## 2023-02-13 DIAGNOSIS — M25.561 RIGHT KNEE PAIN, UNSPECIFIED CHRONICITY: ICD-10-CM

## 2023-02-13 DIAGNOSIS — M25.569 KNEE PAIN, UNSPECIFIED CHRONICITY, UNSPECIFIED LATERALITY: ICD-10-CM

## 2023-02-13 PROCEDURE — 97110 THERAPEUTIC EXERCISES: CPT

## 2023-02-13 NOTE — OP THERAPY DAILY TREATMENT
"  Outpatient Physical Therapy  DAILY TREATMENT     [facility] Outpatient Physical Therapy  [address]  [place] [address]  Phone:  [phone]  Fax:  [phone]    Date: 02/13/2023    Patient: Vanessa Crenshaw  YOB: 1985  MRN: [id number]     Time Calculation    Start time: 1111  Stop time: 1200 Time Calculation (min): 49 minutes         Chief Complaint: Knee Problem    Visit #: 8    SUBJECTIVE:  Pt reporting squats and wall squats may be irritating the knee. Nerve glides did not relieve the burning or n/t that ensued. Pt reporting n/t at anterior shin stopped. Pt reporting she had intermittent incline walking at gym without challenges; grossly 1.0-1.5 incline. Pt went for 40 min walk, states femoral n. Glides assisted with decreasing tightness in leg with improvement.       OBJECTIVE:  Current objective measures:   Ant tibial translation during squats    Genu recurvatum during stance and end of step up    Stair navigation: One HR utilized for step up/step down; no incr in pain reported    Therapeutic Exercises (CPT 04316):     1. pt edu, PT exam findings    2. new hep as below    3. upright bike, L1 x5 min, cardiovascular warm up    13. SLR+TrA, 2x5 ea, VC's to engage core    14. quad stretching in standing using table, x10 ea with PPT    15. femoral n. glides in prone, x15 ea, reviewed    16. calf raises, 20x ea    17. wall sits, 2x60 sec, slight onset of tingling R quad    18. squats, 2x10, incr tibial translation B with instability-improved with pole in front of knees to discourage    19. step ups, 6 in x5 ea, VC's to avoid hyperextension in standing      Therapeutic Exercise Summary: Pt performed these exercises with instruction and SPV.  Provided handout with these exercises for daily HEP.    Responds well to cuing \"leading with hips\"        Time-based treatments/modalities:    Physical Therapy Timed Treatment Charges  Therapeutic exercise " "minutes (CPT 75089): 49 minutes      Pain rating (1-10) before treatment: 0/10; \"when brushing the skin\" 6/10   Pain rating (1-10) after treatment:  0/10 pain and n/t  Pain Comments::  Aggravating: prolonged sitting  Prolonged walking-cramping in calf>1/2 mile or 10 min (sits on recliner <1 hour), ascending stairs, mopping      Goals:   Short Term Goals:   1. Pt will be independent with written HEP. (Met)  2. Pt will be able to progress 1/3 of hep. (Met)  3. Therapist to assess calf strength and endurance. (Met)  Short term goal time span:  2-4 weeks      Long Term Goals:    1. Pt will be independent with written HEP.  2. Pt will have a sig improvement in WOMAC score >/= MDC (eval: 25)  3. Pt will be able to return to gym for LE workouts with min to no modifications at least 3x/wk using weight machines to improve QOL.  4. Pt will be able to navigate stairs with step through gait pattern and no incr in s/s at least 70% of the time or greater.         Long term goal time span:  6-8 weeks    ASSESSMENT:   Response to treatment:   Able to relieve numbness/tingling in anterior R thigh following femoral n. Glides in prone, however, unsuccessful when completing at home during bout of n/t over weekend-will continue to monitor in session. Emphasis on correct squat mechanics and edu re addressing Genu recurvatum during stance and end of step up with pt indep by end of session without incr in s/s. Will review step ups next session as pt demo locking of knees, fear and decreased weight acceptance onto limb.      PLAN/RECOMMENDATIONS:   Plan for treatment: continue with treatment.  Planned interventions for next visit: continue with current hep  Review step ups     [id number] and [id number] per visit for 12 visits    "

## 2023-02-15 ENCOUNTER — PHYSICAL THERAPY (OUTPATIENT)
Dept: PHYSICAL THERAPY | Facility: MEDICAL CENTER | Age: 38
End: 2023-02-15
Attending: INTERNAL MEDICINE
Payer: MEDICAID

## 2023-02-15 DIAGNOSIS — M25.569 KNEE PAIN, UNSPECIFIED CHRONICITY, UNSPECIFIED LATERALITY: ICD-10-CM

## 2023-02-15 DIAGNOSIS — M25.561 RIGHT KNEE PAIN, UNSPECIFIED CHRONICITY: ICD-10-CM

## 2023-02-15 PROCEDURE — 97110 THERAPEUTIC EXERCISES: CPT

## 2023-02-15 PROCEDURE — 97012 MECHANICAL TRACTION THERAPY: CPT

## 2023-02-15 NOTE — OP THERAPY DAILY TREATMENT
"  Outpatient Physical Therapy  DAILY TREATMENT     [facility] Outpatient Physical Therapy  [address]  [place] [address]  Phone:  [phone]  Fax:  [phone]    Date: 02/15/2023    Patient: Vanessa Crenshaw  YOB: 1985  MRN: [id number]     Time Calculation    Start time: 1445  Stop time: 1533 Time Calculation (min): 48 minutes         Chief Complaint: Knee Problem    Visit #: 9    SUBJECTIVE:  Pt reporting burning occurring below the knee and feeling tightness; pain with forward bending and pressing the gas pedal.       OBJECTIVE:  Current objective measures:   +peroneal n. Tension test R    L/s AROM:  Flexion: FT to toes following peroneal n. Glides without burning at knees  WFL ROM in standing; slightly limited l/s extension in prone    Therapeutic Exercises (CPT 44498):     1. pt edu, PT exam findings    2. new hep as below    3. upright bike, L1 x5 min, cardiovascular warm up; onset of burning at knee-improved with posterior lean    4. pt educ, re:    5. peroneal n. glides, x10    6. REIL, x10, limited l/s AROM    7. pt edu, re: utilizing new hep for prevention and during burning and n/t episodes    13. SLR+TrA, 2x5 ea, reviewed    15. femoral n. glides in prone, x15 ea, verbal review    17. wall sits    19. step ups, 6 in x5 ea, VC's to avoid hyperextension in standing      Therapeutic Exercise Summary: Pt performed these exercises with instruction and SPV.  Provided handout with these exercises for daily HEP.    Responds well to cuing \"leading with hips\"        Time-based treatments/modalities:    Physical Therapy Timed Treatment Charges  Therapeutic exercise minutes (CPT 36063): 48 minutes      Pain rating (1-10) before treatment: 0/10; \"when brushing the skin\" 6/10   Pain rating (1-10) after treatment:  0/10 pain and n/t  Pain Comments::  Aggravating: prolonged sitting  Prolonged walking-cramping in calf>1/2 mile or 10 min (sits on recliner " <1 hour), ascending stairs, mopping      Goals:   Short Term Goals:   1. Pt will be independent with written HEP. (Met)  2. Pt will be able to progress 1/3 of hep. (Met)  3. Therapist to assess calf strength and endurance. (Met)  Short term goal time span:  2-4 weeks      Long Term Goals:    1. Pt will be independent with written HEP.  2. Pt will have a sig improvement in WOMAC score >/= MDC (eval: 25)  3. Pt will be able to return to gym for LE workouts with min to no modifications at least 3x/wk using weight machines to improve QOL.  4. Pt will be able to navigate stairs with step through gait pattern and no incr in s/s at least 70% of the time or greater.         Long term goal time span:  6-8 weeks    ASSESSMENT:   Response to treatment:   Suspect component of posterior derangement with pt responding well to posterior lean during bike warm up. Improved s/s with peroneal glides. Additionally set pt up with trial of REIL, standing ext for work purposes and trial of traction today using mild parameters b/w 45-50% BW. Will assess how pt responds to incorporation of additional treatment measures. Strength and squat mechanics otherwise improving.       PLAN/RECOMMENDATIONS:   Plan for treatment: continue with treatment.  Planned interventions for next visit: continue with current hep  Assess response to trial of REIL and [facility] traction completed in session     [id number] and 1x97112 per visit for 12 visits

## 2023-02-20 ENCOUNTER — APPOINTMENT (OUTPATIENT)
Dept: PHYSICAL THERAPY | Facility: MEDICAL CENTER | Age: 38
End: 2023-02-20
Attending: INTERNAL MEDICINE
Payer: MEDICAID

## 2023-02-22 ENCOUNTER — PHYSICAL THERAPY (OUTPATIENT)
Dept: PHYSICAL THERAPY | Facility: MEDICAL CENTER | Age: 38
End: 2023-02-22
Attending: INTERNAL MEDICINE
Payer: MEDICAID

## 2023-02-22 DIAGNOSIS — M25.569 KNEE PAIN, UNSPECIFIED CHRONICITY, UNSPECIFIED LATERALITY: ICD-10-CM

## 2023-02-22 DIAGNOSIS — M25.561 RIGHT KNEE PAIN, UNSPECIFIED CHRONICITY: ICD-10-CM

## 2023-02-22 PROCEDURE — 97110 THERAPEUTIC EXERCISES: CPT

## 2023-02-22 NOTE — OP THERAPY DAILY TREATMENT
"  Outpatient Physical Therapy  DAILY TREATMENT     [facility] Outpatient Physical Therapy  [address]  [place] [address]  Phone:  [phone]  Fax:  [phone]    Date: 02/22/2023    Patient: Vanessa Crenshaw  YOB: 1985  MRN: [id number]     Time Calculation    Start time: 1445  Stop time: 1530 Time Calculation (min): 45 minutes         Chief Complaint: Knee Problem    Visit #: 10    SUBJECTIVE:  Pt reporting hasn't had tingling in her knee but also hasn't been to the gym. She has felt a little rushed today. States she had a little kink in the neck from traction last time so she doesn't want to repeat this treatment. Calf raises have become easier.       OBJECTIVE:  Current objective measures:   +peroneal n. Tension test R    L/s AROM:  Flexion: FT to toes following peroneal n. Glides without burning at knees  WFL ROM in standing; slightly limited l/s extension in prone    Therapeutic Exercises (CPT 13328):     1. pt edu, PT exam findings    2. new hep as below    3. upright bike, L1 x5 min, cardiovascular warm up; onset of burning at knee-improved with posterior lean    5. peroneal n. glides, x10    6. REIL, x10, verbal review    7. pt edu, re: utilizing new hep for prevention and during burning and n/t episodes    9. calf raises, SL x20 ea    10. toy soldier at bar, pink TB, 3x to endurance, fatigue; VC's for correction due to knee bend and lifting vs. WS    13. SLR+TrA, 2x5 ea, reviewed    15. femoral n. glides in prone, x15 ea, verbal review    19. step ups, 6 in x2, pt reporting \"knee feels hollow\" states knee feels weak and does not like this exercise    20. step downs 4 in step, 10x ea, VC's for hinge with improvement; pt reporting minor knee pain at end of session at patellar tendon R      Therapeutic Exercise Summary: Pt performed these exercises with instruction and SPV.  Provided handout with these exercises for daily HEP.    Responds well " "to cuing \"leading with hips\"        Time-based treatments/modalities:    Physical Therapy Timed Treatment Charges  Therapeutic exercise minutes (CPT 31277): 45 minutes      Pain rating (1-10) before treatment: 0/10; \"when brushing the skin\" 6/10   Pain rating (1-10) after treatment:  0/10 pain and n/t  Pain Comments::  Aggravating: prolonged sitting  Prolonged walking-cramping in calf>1/2 mile or 10 min (sits on recliner <1 hour), ascending stairs, mopping      Goals:   Short Term Goals:   1. Pt will be independent with written HEP. (Met)  2. Pt will be able to progress 1/3 of hep. (Met)  3. Therapist to assess calf strength and endurance. (Met)  Short term goal time span:  2-4 weeks      Long Term Goals:    1. Pt will be independent with written HEP.  2. Pt will have a sig improvement in WOMAC score >/= MDC (eval: 25)  3. Pt will be able to return to gym for LE workouts with min to no modifications at least 3x/wk using weight machines to improve QOL.  4. Pt will be able to navigate stairs with step through gait pattern and no incr in s/s at least 70% of the time or greater.         Long term goal time span:  6-8 weeks    Pain comments:Prolonged walking-cramping in calf>1/2 mile or 10 min (sits on recliner <1 hour), ascending stairs, mopping       ASSESSMENT:   Response to treatment:   Pt has demonstrated a lot of improvements in therapy with improved walking without cramps, stair navigation, decreased pain with ADL's and chores. Pt has not returned to gym so is unsure if REIL is assisting with n/t in LE or if she has not been \"active\" at the gym. Discussion re: continuance of REIL and standing l/s extension at work until next session and will assess progress made overall. Will review squat mechanics next session.       PLAN/RECOMMENDATIONS:   Plan for treatment: continue with treatment.  Planned interventions for next visit: continue with current hep  Progress assessment     [id number] and 1x97112 per visit for 12 " visits

## 2023-02-27 ENCOUNTER — PHYSICAL THERAPY (OUTPATIENT)
Dept: PHYSICAL THERAPY | Facility: MEDICAL CENTER | Age: 38
End: 2023-02-27
Attending: INTERNAL MEDICINE
Payer: MEDICAID

## 2023-02-27 DIAGNOSIS — M25.561 RIGHT KNEE PAIN, UNSPECIFIED CHRONICITY: ICD-10-CM

## 2023-02-27 PROCEDURE — 97110 THERAPEUTIC EXERCISES: CPT

## 2023-02-27 NOTE — OP THERAPY DAILY TREATMENT
"  Outpatient Physical Therapy  DAILY TREATMENT     [facility] Outpatient Physical Therapy  [address]  [place] [address]  Phone:  [phone]  Fax:  [phone]    Date: 02/27/2023    Patient: Vanessa Crenshaw  YOB: 1985  MRN: [id number]     Time Calculation    Start time: 0947  Stop time: 1031 Time Calculation (min): 44 minutes         Chief Complaint: Knee Problem    Visit #: 11    SUBJECTIVE:  Pt reporting he went to the gym. Did not notice at the gym. Is noticing some tingling intermittent when she is active. Has not been completing the squats as she is fearful it will cause s/s.       OBJECTIVE:  Current objective measures:   Peroneal n. Tension test neg B  Slump test: Neg R, pos L for neural tension-pain free    L/s AROM:  Flexion: FT to toes following peroneal n. Glides without burning at knees  WFL ROM in standing; slightly limited l/s extension in prone    Therapeutic Exercises (CPT 17875):     1. pt edu, PT exam findings    2. new hep as below    3. upright bike, L1 x6 min, cardiovascular warm up; onset of burning at knee-improved with posterior lean    4. mini squats to plinth    5. 1/2 lunges, x10 ea at bar, discussed decreased ant tibial excursion    8. calf eccentrics on stairs with HR, x6 ea    9. calf raises, SL x20 ea, reviewed    15. femoral n. glides in prone, x10 ea, reviewed    20. step downs 2-4 in step, 10x ea, VC's for hinge with improvement compared to last session      Therapeutic Exercise Summary: Pt performed these exercises with instruction and SPV.  Provided handout with these exercises for daily HEP.    Responds well to cuing \"leading with hips\"        Time-based treatments/modalities:    Physical Therapy Timed Treatment Charges  Therapeutic exercise minutes (CPT 03824): 44 minutes      Pain rating (1-10) before treatment: 0/10; \"when brushing the skin\" 6/10   Pain rating (1-10) after treatment:  0/10 pain and n/t  Pain " Comments::  Aggravating: prolonged sitting  Prolonged walking-cramping in calf>1/2 mile or 10 min (sits on recliner <1 hour), ascending stairs, mopping      Goals:   Short Term Goals:   1. Pt will be independent with written HEP. (Met)  2. Pt will be able to progress 1/3 of hep. (Met)  3. Therapist to assess calf strength and endurance. (Met)  Short term goal time span:  2-4 weeks      Long Term Goals:    1. Pt will be independent with written HEP.  2. Pt will have a sig improvement in WOMAC score >/= MDC (eval: 25)  3. Pt will be able to return to gym for LE workouts with min to no modifications at least 3x/wk using weight machines to improve QOL.  4. Pt will be able to navigate stairs with step through gait pattern and no incr in s/s at least 70% of the time or greater.         Long term goal time span:  6-8 weeks    Pain comments:Prolonged walking-cramping in calf>1/2 mile or 10 min (sits on recliner <1 hour), ascending stairs, mopping       ASSESSMENT:   Response to treatment:   Pt demonstrating improvements in BLE strength; decreased ant tibial translation noted with pt reporting no pain today during squatting. Discussion today re: progression of indep hep including examples. Will complete DC note next session and review questions/indep hep for maintenance.      PLAN/RECOMMENDATIONS:   Plan for treatment: continue with treatment.  Planned interventions for next visit: continue with current hep  DC next session with indep hep     [id number] and 1x97112 per visit for 12 visits

## 2023-03-02 ENCOUNTER — PHYSICAL THERAPY (OUTPATIENT)
Dept: PHYSICAL THERAPY | Facility: MEDICAL CENTER | Age: 38
End: 2023-03-02
Attending: INTERNAL MEDICINE
Payer: MEDICAID

## 2023-03-02 DIAGNOSIS — M25.569 KNEE PAIN, UNSPECIFIED CHRONICITY, UNSPECIFIED LATERALITY: ICD-10-CM

## 2023-03-02 DIAGNOSIS — M25.561 RIGHT KNEE PAIN, UNSPECIFIED CHRONICITY: ICD-10-CM

## 2023-03-02 PROCEDURE — 97110 THERAPEUTIC EXERCISES: CPT

## 2023-03-02 NOTE — OP THERAPY DAILY TREATMENT
"  Outpatient Physical Therapy  DAILY TREATMENT     [facility] Outpatient Physical Therapy  [address]  [place] [address]  Phone:  [phone]  Fax:  [phone]    Date: 03/02/2023    Patient: Vanessa Cernshaw  YOB: 1985  MRN: [id number]     Time Calculation    Start time: 0815  Stop time: 0835 Time Calculation (min): 20 minutes         Chief Complaint: Knee Problem    Visit #: 12    SUBJECTIVE:  Pt reporting has not gone to gym in  a few days. Reports she needs to leave at 8:35 for MD appt that was rescheduled.      OBJECTIVE:  Current objective measures:   See DC note    Therapeutic Exercises (CPT 74575):     1. pt edu, PT exam findings    2. new hep as below    3. upright bike, L1 x6 min, cardiovascular warm up; onset of burning at knee-improved with posterior lean    4. mini squats to plinth    5. 1/2 lunges, x10 ea at bar, discussed decreased ant tibial excursion    8. calf eccentrics on stairs with HR, x6 ea    9. calf raises, SL x20 ea, reviewed    15. femoral n. glides in prone, x10 ea, reviewed    20. step downs 2-4 in step, 10x ea, VC's for hinge with improvement compared to last session      Therapeutic Exercise Summary: Pt performed these exercises with instruction and SPV.  Provided handout with these exercises for daily HEP.    Responds well to cuing \"leading with hips\"        Time-based treatments/modalities:    Physical Therapy Timed Treatment Charges  Therapeutic exercise minutes (CPT 27101): 20 minutes        ASSESSMENT:   Response to treatment:   Abbreviated session as pt needing to leave for doctors appt today.  See DC note for details.        PLAN/RECOMMENDATIONS:   Plan for treatment: DC with indep hep      "

## 2023-03-02 NOTE — OP THERAPY DISCHARGE SUMMARY
Outpatient Physical Therapy  DISCHARGE SUMMARY NOTE      [facility] Outpatient Physical Therapy  [address]  [facility] [address]  Phone:  [phone]  Fax:  [phone]    Date of Visit: 03/02/2023    Patient: Vanessa Crenshaw  YOB: 1985  MRN: [id number]     Referring Provider: Vanessa Carlson M.D.  [address] [place] [id number]   Referring Diagnosis Pain in right knee [M25.561]         Functional Assessment Used  WOMAC Grand Total: 14.58     Your patient is being discharged from Physical Therapy with the following comments:   Goals met  Goals partially met    Comments:  Per lynnette, Pt presents to therapy with complaints of Chronic R knee pain from old injury where she fell onto knee onto hardwood when trying to step over baby gate a week before Halloween; had improvements with PT in the past. Pt reporting she notices she is limping; endorses numbness and swelling. Denies instability, locking, popping, tingling. Of note, pt did have fall recently due to slipping when seeing her primary which increased s/s.  Endorses cramping in the calf with prolonged walking. A few days ago endorses electric shock at superior and anterior knee. Notices tingling in anterior knee and during day (without ability to pinpoint activities). Pt reporting she has returned to the gym on TM x 10 min intervals and feels tightness in calf with walking.     Aggravating factors at eval: prolonged sitting, Prolonged walking-cramping in calf>1/2 mile or 10 min (sits on recliner <1 hour), ascending stairs, mopping       Pt has been seen for 12 visits with improvements in skilled physical therapy.  Initially doing very well with POC complicated by slip/fall on ice which re-aggravated knee pain while being seen by PT. She has since recovered from the this incident. She is no longer experiencing cramping with walking and is able to walk for prolonged periods >1/2 mile. She is  able to sit for >10 minutes without any issues; work is not impacted. Pt able to navigate stairs successfully using HR and step through gait pattern. Has returned to normal chores and mopping without knee pain. Pt is currently attending gym regularly and completing routine without increase in pain. Pt does occasionally experience n/t at R anterior quad intermittently, but this is brief. She also experiences tingling and knee discomfort with prolonged walking while wearing snow boots and ascending stairs 20% of the time. This only occurs when she is active and can use her hep to manage and abolish independently. Pt has demonstrated improved squat mechanics, improved strength at proximal pelvic girdle and BLE's. She has progressed from plinth to functional WB activities including SL challenges and is ready to DC today with indep hep. Pt advised to return to PT in the future if needed.    Objective      Neurological Testing      Dermatome testing   Lumbar (left)   All left lumbar dermatomes intact     Lumbar (right)   All right lumbar dermatomes intact       ROM:  R Knee ROM WFL 0-137 deg without s/s      Strength:       Additional Strength Details  Squat WFL--improved        Tests    Lumbar spine (left)      Negative slump.   Lumbar spine (right)     Negative slump.     Goals:   Short Term Goals:   1. Pt will be independent with written HEP. (Met)  2. Pt will be able to progress 1/3 of hep. (Met)  3. Therapist to assess calf strength and endurance. (Met)  Short term goal time span:  2-4 weeks      Long Term Goals:    1. Pt will be independent with written HEP. (Met)  2. Pt will have a sig improvement in WOMAC score >/= MDC (eval: 25) (met)  3. Pt will be able to return to gym for LE workouts with min to no modifications at least 3x/wk using weight machines to improve QOL. (Met)  4. Pt will be able to navigate stairs with step through gait pattern and no incr in s/s at least 70% of the time or greater. (partially met;  20% still has s/s up the stairs)        Long term goal time span:  6-8 weeks    Recommendations:  Pt has satisfied above listed goals and is ready to DC today with independent hep. All questions answered and verbally reviewed independent hep with pt. Expressed to pt that she can return to skilled physical therapy if condition should change or worsen in future.      Kelvin Isaac, PT    Date: 3/2/2023

## 2023-03-15 ENCOUNTER — APPOINTMENT (OUTPATIENT)
Dept: PHYSICAL THERAPY | Facility: MEDICAL CENTER | Age: 38
End: 2023-03-15
Attending: INTERNAL MEDICINE
Payer: MEDICAID
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
## 2023-03-21 ENCOUNTER — APPOINTMENT (OUTPATIENT)
Dept: PHYSICAL THERAPY | Facility: MEDICAL CENTER | Age: 38
End: 2023-03-21
Attending: INTERNAL MEDICINE
Payer: MEDICAID

## 2023-03-27 ENCOUNTER — APPOINTMENT (OUTPATIENT)
Dept: PHYSICAL THERAPY | Facility: MEDICAL CENTER | Age: 38
End: 2023-03-27
Attending: INTERNAL MEDICINE
Payer: MEDICAID

## 2023-03-29 ENCOUNTER — APPOINTMENT (OUTPATIENT)
Dept: PHYSICAL THERAPY | Facility: MEDICAL CENTER | Age: 38
End: 2023-03-29
Attending: INTERNAL MEDICINE
Payer: MEDICAID

## 2023-04-20 ENCOUNTER — HOSPITAL ENCOUNTER (OUTPATIENT)
Dept: RADIOLOGY | Facility: MEDICAL CENTER | Age: 38
End: 2023-04-20
Attending: INTERNAL MEDICINE
Payer: MEDICAID

## 2023-04-20 DIAGNOSIS — R10.9 ABDOMINAL PAIN, UNSPECIFIED ABDOMINAL LOCATION: ICD-10-CM

## 2023-04-20 PROCEDURE — 78227 HEPATOBIL SYST IMAGE W/DRUG: CPT

## 2023-06-27 ENCOUNTER — PHYSICAL THERAPY (OUTPATIENT)
Dept: PHYSICAL THERAPY | Facility: MEDICAL CENTER | Age: 38
End: 2023-06-27
Attending: INTERNAL MEDICINE
Payer: MEDICAID

## 2023-06-27 DIAGNOSIS — M25.561 RIGHT KNEE PAIN, UNSPECIFIED CHRONICITY: ICD-10-CM

## 2023-06-27 PROCEDURE — 97162 PT EVAL MOD COMPLEX 30 MIN: CPT

## 2023-06-27 ASSESSMENT — ENCOUNTER SYMPTOMS
PAIN SCALE AT LOWEST: 0
QUALITY: STRETCHING SENSATION
PAIN SCALE AT HIGHEST: 5
PAIN TIMING: INTERMITTENT
QUALITY: THROBBING
QUALITY: CRAMPING
PAIN SCALE: 0
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
## 2023-07-19 ENCOUNTER — TELEPHONE (OUTPATIENT)
Dept: PHYSICAL THERAPY | Facility: MEDICAL CENTER | Age: 38
End: 2023-07-19
Payer: MEDICAID

## 2023-07-26 ENCOUNTER — PHYSICAL THERAPY (OUTPATIENT)
Dept: PHYSICAL THERAPY | Facility: MEDICAL CENTER | Age: 38
End: 2023-07-26
Attending: INTERNAL MEDICINE
Payer: MEDICAID

## 2023-07-26 DIAGNOSIS — M25.561 RIGHT KNEE PAIN, UNSPECIFIED CHRONICITY: ICD-10-CM

## 2023-07-26 DIAGNOSIS — M25.569 KNEE PAIN, UNSPECIFIED CHRONICITY, UNSPECIFIED LATERALITY: ICD-10-CM

## 2023-07-26 PROCEDURE — 97110 THERAPEUTIC EXERCISES: CPT

## 2023-07-26 NOTE — OP THERAPY DAILY TREATMENT
Outpatient Physical Therapy  DAILY TREATMENT     [facility] Outpatient Physical Therapy  [address]  [place] [address]  Phone:  [phone]  Fax:  [phone]    Date: 07/26/2023    Patient: Vanessa Crenshaw  YOB: 1985  MRN: [id number]     Time Calculation    Start time: 1116  Stop time: 1158 Time Calculation (min): 42 minutes         Chief Complaint: Knee Problem    Visit #: 2    SUBJECTIVE:  Pt reporting did some leg exercises and felt a lot of swelling. Was walking at faster pace for 30 minutes. Had swelling at the lateral knee. Was tender and swelling-wrapped the knee with improvements. Not noticing a lot of change with cramping with hep. Pt reporting wakes up with cramping in the leg.       OBJECTIVE:  Current objective measures:   WOMAC Grand Total: 31.25   See progress note          Therapeutic Exercises (CPT 06200):     2. prev hep, sciatic n. glides, PPU's, verbal review    6. inferior patellar glides, x2 min seated, hep    7. eccentric heel drops at step, DL and then SL, x10 ea, hep    8. staggered sit to stand, x5 ea, increased difficulty on RLE; hep      Therapeutic Exercise Summary: Pt performed these exercises with instruction and SPV.  Provided handout with these exercises for daily HEP.    Access Code: [email or web address]  URL: [email or web address]  Date: 07/26/2023  Prepared by: Kelvin Isaac    Exercises  - Eccentric Heel Lowering on Step  - 3 x weekly - 1-2 sets - 10-15 reps  - Seated Inferior Patellar Glide  - 2 x daily - 7 x weekly - 1 reps  - Staggered Sit-to-Stand  - 3 x weekly - 1-2 sets - 10-15 reps          Time-based treatments/modalities:           Pain rating (1-10) before treatment:    Pain rating (1-10) after treatment:    Pain Comments::  Aggravating: prolonged sitting>after a work shift, prolonged walking>1 mile, stairs (walking on heels), stiffness and weakness in the morning    ASSESSMENT:   Response to  treatment: see progress note     PLAN/RECOMMENDATIONS:   Plan for treatment: therapy treatment to continue next visit.  Planned interventions for next visit: continue with current treatment.  3x97110 and 1x97112 per visit for 12 visits

## 2023-07-26 NOTE — OP THERAPY PROGRESS SUMMARY
Outpatient Physical Therapy  PROGRESS SUMMARY NOTE      [facility] Outpatient Physical Therapy  [address]  [place] [address]  Phone:  [phone]  Fax:  [phone]    Date of Visit: 07/26/2023    Patient: Vanessa Crenshaw  YOB: 1985  MRN: [id number]     Referring Provider: Vanessa Carlson M.D.  [address] [place] [id number]   Referring Diagnosis Pain in right knee [M25.561]     Visit Diagnoses     ICD-10-CM   1. Right knee pain, unspecified chronicity  M25.561   2. Knee pain, unspecified chronicity, unspecified laterality  M25.569       Rehab Potential: fair/good    Progress Report Period: 6/27/23-7/26/23    Functional Assessment Used  WOMAC Grand Total: 31.25       Objective Findings and Assessment:   Patient progression towards goals: Pt presenting for first follow up since evaluation due to lag in insurance authorization. Reporting she has not noticed significant changes since initial evaluation; tension and cramping overall unchanged with hep. Based on no changes from eval, suspect posterior derangement may be ruled out.Endorses she continues to navigate stairs on her heels out of habit -has not attempted to ascend/descend normally. Does notice cramping and stiffness in the AM. Has noticed some swelling at lateral knee after completing incr amb and LE workout at the gym even though she used low weight resistance. Pt demonstrating increased tenderness at distal HS insertion at R knee, neural tension, and hypomobile patella in sagittal plane based on today's assessment. Noted posterior chain weakness R>L. Will encourage mobilization of patella and work on strengthening to prox pelvic girdle in upcoming sessions.     Objective findings and assessment details: Hypomobile patella inferior glide R  TTP distal HS insertion R  Staggered sit to stands: increased difficulty R>L      Goals:   Short Term Goals:   1. Pt will be independent  with written HEP. (Met)  2. Pt will be able to progress 1/3 of hep. (Ongoing)    Short term goal time span:  2-4 weeks      Long Term Goals:    1. Pt will be independent with written HEP.  2. Pt will have a sig improvement in WOMAC score >/= MDC (eval: 45.83) (met)  3. Pt will be able to return to gym for LE workouts with min to no modifications at least 3x/wk using weight machines to improve QOL.  4. Pt will be able to navigatetairs with step through gait pattern and no incr in s/s at least 70% of the time or greater.        Long term goal time span:  6-8 weeks    Plan:   Planned therapy interventions:  Neuromuscular Re-education (CPT 26789), Gait Training (CPT 49093), Therapeutic Exercise (CPT 40366) and Manual Therapy (CPT 81604)  Frequency: 1-2x/wk.  Duration in visits:  12  Plan details:  UPOC: 9/15/23    3x97110 and 1x97112 per visit for 12 visits       Referring provider co-signature:  I have reviewed this plan of care and my co-signature certifies the need for services.     Certification Period: 07/26/2023 to 9/15/23    Physician Signature: ________________________________ Date: ______________

## 2023-08-01 ENCOUNTER — APPOINTMENT (OUTPATIENT)
Dept: PHYSICAL THERAPY | Facility: MEDICAL CENTER | Age: 38
End: 2023-08-01
Attending: INTERNAL MEDICINE
Payer: MEDICAID

## 2023-08-08 ENCOUNTER — APPOINTMENT (OUTPATIENT)
Dept: PHYSICAL THERAPY | Facility: MEDICAL CENTER | Age: 38
End: 2023-08-08
Attending: INTERNAL MEDICINE
Payer: MEDICAID

## 2023-08-15 ENCOUNTER — PHYSICAL THERAPY (OUTPATIENT)
Dept: PHYSICAL THERAPY | Facility: MEDICAL CENTER | Age: 38
End: 2023-08-15
Attending: INTERNAL MEDICINE
Payer: MEDICAID

## 2023-08-15 DIAGNOSIS — M25.569 KNEE PAIN, UNSPECIFIED CHRONICITY, UNSPECIFIED LATERALITY: ICD-10-CM

## 2023-08-15 DIAGNOSIS — M25.561 RIGHT KNEE PAIN, UNSPECIFIED CHRONICITY: ICD-10-CM

## 2023-08-15 PROCEDURE — 97110 THERAPEUTIC EXERCISES: CPT

## 2023-08-15 NOTE — OP THERAPY DAILY TREATMENT
Outpatient Physical Therapy  DAILY TREATMENT     [facility]  [address]  [place] [address]  Phone:  [phone]  Fax:  [phone]    Date: 08/15/2023    Patient: Vanessa Crenshaw  YOB: 1985  MRN: [id number]     Time Calculation    Start time: 1500  Stop time: 1538 Time Calculation (min): 38 minutes         Chief Complaint: Knee Problem    Visit #: 3    SUBJECTIVE:  Pt will be starting [employer] tomorrow. 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 Pt will be needing to kneel and squat. Pt believes she pinched a nerve while kayaking. Pt reporting she took a break from her exercises. Stating she went to the gym and felt fine.     OBJECTIVE:  Current objective measures:       Objective findings and assessment details:   Hypomobile patella inferior glide R  TTP distal HS insertion R  Staggered sit to stands: increased difficulty R>L       Therapeutic Exercises (CPT 92813):     3. upright bike, L1 x6 min, cardiovascular warm up; onset of burning at knee-improved with posterior lean    4. mini squats to plinth    5. 1/2 lunges, x10 ea at bar, discussed decreased ant tibial excursion    6. inferior patellar glides, x2 min seated, reviewed    7. eccentric heel drops at step, DL and then SL, x10 ea, hep    8. staggered sit to stand, x5 ea, increased difficulty on RLE; hep    9. calf raises, DL x 10, SL x10, reviewed; VC's to slow eccentrics    10. sciatic n. glides, x10, reviewed    12. SLS on foam pad, 2x10 sec    13. WS sagittal plane, x10    14. wobble board sagittal plane, x20 ea    15. femoral n. glides in prone, x10 ea, reviewed    20. UPOC: 8/26/23, 10x ea, VC's for hinge with improvement compared to last session      Therapeutic Exercise Summary: Pt performed these exercises with instruction and SPV.  Provided handout with these exercises for daily HEP.    Access Code: [email or web address]  URL:  [email or web address]  Date: 07/26/2023  Prepared by: Kelvin Isaac    Exercises  - Eccentric Heel Lowering on Step  - 3 x weekly - 1-2 sets - 10-15 reps  - Seated Inferior Patellar Glide  - 2 x daily - 7 x weekly - 1 reps  - Staggered Sit-to-Stand  - 3 x weekly - 1-2 sets - 10-15 reps        Therapeutic Treatments and Modalities:     1. Neuromuscular Re-education (CPT 25818), see below    Therapeutic Treatment and Modalities Summary: Neuro re-ed:  R inferior Patellar glides   STM to HS R     Time-based treatments/modalities:    Physical Therapy Timed Treatment Charges  Therapeutic exercise minutes (CPT 62226): 38 minutes      Pain rating (1-10) before treatment:    Pain rating (1-10) after treatment:    Pain Comments::  Aggravating: prolonged sitting>after a work shift, prolonged walking>1 mile, stairs (walking on heels), stiffness and weakness in the morning    ASSESSMENT:   Response to treatment:  Pt reporting decreased frequency of hep and attendance to gym due to external factors. Stating this has assisted with comfort of the knee. Has not yet attempted navigating stairs with normal step through gait pattern but will attempt before next PT session. Pt is hopeful to start progressing towards new goals for improved kneeling and squatting with decr knee pain.    PLAN/RECOMMENDATIONS:   Plan for treatment: therapy treatment to continue next visit.  Planned interventions for next visit: continue with current treatment.  3x97110 and 1x97112 per visit for 12 visits

## 2023-08-22 ENCOUNTER — APPOINTMENT (OUTPATIENT)
Dept: PHYSICAL THERAPY | Facility: MEDICAL CENTER | Age: 38
End: 2023-08-22
Attending: INTERNAL MEDICINE
Payer: MEDICAID

## 2023-08-29 ENCOUNTER — APPOINTMENT (OUTPATIENT)
Dept: PHYSICAL THERAPY | Facility: MEDICAL CENTER | Age: 38
End: 2023-08-29
Attending: INTERNAL MEDICINE
Payer: MEDICAID

## 2023-08-31 ENCOUNTER — TELEPHONE (OUTPATIENT)
Dept: PHYSICAL THERAPY | Facility: REHABILITATION | Age: 38
End: 2023-08-31
Payer: MEDICAID

## 2023-08-31 NOTE — OP THERAPY PROGRESS SUMMARY
Outpatient Physical Therapy  PROGRESS SUMMARY NOTE      [facility] [place] [address]  [address]  [facility] [address]  Phone:  [phone]  Fax:  [phone]    Date of Visit: 08/31/2023    Patient: Vanessa Crenshaw  YOB: 1985  MRN: [id number]     Referring Provider: Vanessa Carlson M.D.  [facility] [place] [address]   Referring Diagnosis Pain in right knee [M25.561]     1. Right knee pain, unspecified chronicity  M25.561   2. Knee pain, unspecified chronicity, unspecified laterality  M25.569       Rehab Potential: fair/good    Progress Report Period: 7/26 - 8/31/23    Functional Assessment Used    WOMAC Grand Total: 31.25          Objective Findings and Assessment:   Patient progression towards goals: Patient has attended one PT visit since last progress note on 7/26, thus no changes to report.  Pt stated she believes she pinched a nerve while kayaking. Pt reporting she took a break from her exercises. Stating she went to the gym and felt fine. Will be starting school and new job, requiring kneeling/squatting.    Objective findings and assessment details: Hypomobile patella inferior glide R  TTP distal HS insertion R  Staggered sit to stands: increased difficulty R>L    Goals:   Short Term Goals:   1. Pt will be independent with written HEP. (Met)  2. Pt will be able to progress 1/3 of hep. (Ongoing)  Short term goal time span:  2-4 weeks      Long Term Goals:    1. Pt will be independent with written HEP.  2. Pt will have a sig improvement in WOMAC score >/= MDC (eval: 45.83) (met)  3. Pt will be able to return to gym for LE workouts with min to no modifications at least 3x/wk using weight machines to improve QOL.(not met, continue)  4. Pt will be able to navigatetairs with step through gait pattern and no incr in s/s at least 70% of the time or greater. (Not met, continue)  Long term goal time span:  6-8 weeks    Plan:   Planned therapy  interventions:  Neuromuscular Re-education (CPT 89220), Gait Training (CPT 34771), Manual Therapy (CPT 30378) and Therapeutic Exercise (CPT 81090)  Frequency: 1-2x/wk.  Duration in visits:  12      Referring provider co-signature:  I have reviewed this plan of care and my co-signature certifies the need for services.     Certification Period: 08/31/2023 to 10/30/23    Physician Signature: ________________________________ Date: ______________

## 2023-09-05 ENCOUNTER — APPOINTMENT (OUTPATIENT)
Dept: PHYSICAL THERAPY | Facility: MEDICAL CENTER | Age: 38
End: 2023-09-05
Attending: INTERNAL MEDICINE
Payer: MEDICAID

## 2023-09-12 ENCOUNTER — APPOINTMENT (OUTPATIENT)
Dept: PHYSICAL THERAPY | Facility: MEDICAL CENTER | Age: 38
End: 2023-09-12
Attending: INTERNAL MEDICINE
Payer: MEDICAID

## 2023-09-14 ENCOUNTER — PHYSICAL THERAPY (OUTPATIENT)
Dept: PHYSICAL THERAPY | Facility: MEDICAL CENTER | Age: 38
End: 2023-09-14
Attending: INTERNAL MEDICINE
Payer: MEDICAID

## 2023-09-14 DIAGNOSIS — M25.561 RIGHT KNEE PAIN, UNSPECIFIED CHRONICITY: ICD-10-CM

## 2023-09-14 DIAGNOSIS — M25.569 KNEE PAIN, UNSPECIFIED CHRONICITY, UNSPECIFIED LATERALITY: ICD-10-CM

## 2023-09-14 PROCEDURE — 97110 THERAPEUTIC EXERCISES: CPT

## 2023-09-14 NOTE — OP THERAPY DAILY TREATMENT
Outpatient Physical Therapy  DAILY TREATMENT     [facility]  [address]  [place] [address]  Phone:  [phone]  Fax:  [phone]    Date: 09/14/2023    Patient: Vanessa Crenshaw  YOB: 1985  MRN: [id number]     Time Calculation    Start time: 0948  Stop time: 1027 Time Calculation (min): 39 minutes         Chief Complaint: Knee Problem    Visit #: 4    SUBJECTIVE:  Pt reporting started new job. Is currently having less frequent pain but is still intermittent; is not able to identify aggravating or relieving factors.     OBJECTIVE:  Current objective measures:   Instability on Bosu with SLS B     Hip rotation: limited R hip IR PROM (90/90 supine position)    Patellar glide: mod hypombility inferior glide to R patella        Objective findings and assessment details:   Hypomobile patella inferior glide R  TTP distal HS insertion R  Staggered sit to stands: increased difficulty R>L       Therapeutic Exercises (CPT 27477):     1. review of hep questions    3. upright bike, L1 x6 min, cardiovascular warm up; onset of burning at knee-improved with posterior lean    12. SLS on foam pad->SLS on Bosu, 5x 10 sec ea    13. BOSU mini squats, x10, UE maintained on bar    14. mermaid, x10, limited R hip IR ROM    15. patellar mobilization self, x2 min, reviewed    20. UPOC: 8/26/23, 10x ea, VC's for hinge with improvement compared to last session      Therapeutic Exercise Summary: Access Code: [email or web address]  URL: [email or web address]  Date: 09/14/2023  Prepared by: Kelvin Isaac    Exercises  - Eccentric Heel Lowering on Step  - 3 x weekly - 1-2 sets - 10-15 reps  - Seated Inferior Patellar Glide  - 2 x daily - 7 x weekly - 1 reps  - Staggered Sit-to-Stand  - 3 x weekly - 1-2 sets - 10-15 reps  - Standing Anterior Posterior Weight Shift  - 3 x weekly - 2-3 sets - 10 reps  - Single Leg Stance on Foam Pad  - 1 x daily - 3 x  weekly - 2-3 sets - 10 sec hold  - Mermaid  - 3 x weekly - 1 sets - 10-15 reps    Pt performed these exercises with instruction and SPV.  Provided handout with these exercises for daily HEP.          Time-based treatments/modalities:    Physical Therapy Timed Treatment Charges  Therapeutic exercise minutes (CPT 76893): 39 minutes      Pain rating (1-10) before treatment:    Pain rating (1-10) after treatment:    Pain Comments::  Aggravating: prolonged sitting>after a work shift, prolonged walking>1 mile, stairs (walking on heels), stiffness and weakness in the morning    ASSESSMENT:   Response to treatment:  Pt demonstrating limited R hip IR PROM, which may be impacting R knee negatively. Updated mermaid to hep to work on mobilization. Pt changed shoes and believes this may also have positively improved her s/s. Pt on good trajectory with less freq s/s; will continue to mobilize hip in addition to strengthening routine to assess benefits on ADL's and fxn.    PLAN/RECOMMENDATIONS:   Plan for treatment: therapy treatment to continue next visit.  Planned interventions for next visit: continue with current treatment.  3x97110 and 1x97112 per visit for 12 visits

## 2023-09-19 ENCOUNTER — APPOINTMENT (OUTPATIENT)
Dept: PHYSICAL THERAPY | Facility: MEDICAL CENTER | Age: 38
End: 2023-09-19
Attending: INTERNAL MEDICINE
Payer: MEDICAID

## 2023-09-20 ENCOUNTER — PHYSICAL THERAPY (OUTPATIENT)
Dept: PHYSICAL THERAPY | Facility: MEDICAL CENTER | Age: 38
End: 2023-09-20
Attending: INTERNAL MEDICINE
Payer: MEDICAID

## 2023-09-20 DIAGNOSIS — M25.561 RIGHT KNEE PAIN, UNSPECIFIED CHRONICITY: ICD-10-CM

## 2023-09-20 DIAGNOSIS — M25.569 KNEE PAIN, UNSPECIFIED CHRONICITY, UNSPECIFIED LATERALITY: ICD-10-CM

## 2023-09-20 PROCEDURE — 97112 NEUROMUSCULAR REEDUCATION: CPT

## 2023-09-20 PROCEDURE — 97110 THERAPEUTIC EXERCISES: CPT

## 2023-09-20 NOTE — OP THERAPY DAILY TREATMENT
Outpatient Physical Therapy  DAILY TREATMENT     [facility]  [address]  [place] [address]  Phone:  [phone]  Fax:  [phone]    Date: 09/20/2023    Patient: Vanessa Crenshaw  YOB: 1985  MRN: [id number]     Time Calculation    Start time: 1200              Chief Complaint: Knee Problem    Visit #: 5    SUBJECTIVE:  Pt reporting has had a long week with difficulty completing new hip ROM hep. Stating her shin has been bothering her and its been on-off. Woke up without pain but incr pain with walking. Walking on heels has been less painful.       OBJECTIVE:  Current objective measures:   TTP ant tibialis and tibialist posterior R   Ankle ROM from the wall:  L: 5 in   R: 4.5 in    Prev session:  Hip rotation: limited R hip IR PROM (90/90 supine position)    Patellar glide: mod hypombility inferior glide to R patella        Objective findings and assessment details:   Hypomobile patella inferior glide R  TTP distal HS insertion R  Staggered sit to stands: increased difficulty R>L       Therapeutic Exercises (CPT 15516):     1. review of hep questions    3. upright bike, L1 x5 min, cardiovascular warm up    4. incline calf stretch, x60 sec    12. SLS on foam pad->SLS on Bosu, 5x 10 sec ea    13. BOSU mini squats, x10, UE maintained on bar    14. mermaid, x15, reviewed; good carryover    15. ankle DF with green TB seated, x15, hep    20. UPOC: 8/26/23, 10x ea, VC's for hinge with improvement compared to last session      Therapeutic Exercise Summary: Access Code: [email or web address]  URL: [email or web address]  Date: 09/14/2023  Prepared by: Kelvin Isaac    Exercises  - Eccentric Heel Lowering on Step  - 3 x weekly - 1-2 sets - 10-15 reps  - Seated Inferior Patellar Glide  - 2 x daily - 7 x weekly - 1 reps  - Staggered Sit-to-Stand  - 3 x weekly - 1-2 sets - 10-15 reps  - Standing Anterior Posterior Weight Shift  - 3 x  weekly - 2-3 sets - 10 reps  - Single Leg Stance on Foam Pad  - 1 x daily - 3 x weekly - 2-3 sets - 10 sec hold  - Mermaid  - 3 x weekly - 1 sets - 10-15 reps    Pt performed these exercises with instruction and SPV.  Provided handout with these exercises for daily HEP.        Therapeutic Treatments and Modalities:     1. Neuromuscular Re-education (CPT 92676), see below, x8 min    Therapeutic Treatment and Modalities Summary: Neuro re-ed: STM to ant tibialis and post tibialis R with pt in lying     Time-based treatments/modalities:           Pain rating (1-10) before treatment: 0/10  Pain rating (1-10) after treatment:    Pain Comments::  Aggravating: prolonged sitting>after a work shift, prolonged walking>1 mile, stairs (walking on heels), stiffness and weakness in the morning    ASSESSMENT:   Response to treatment:  Pain has changed over past few weeks, now localized at anterior shin R with activity, corwin with walking. Improved with decreasing ankle rocker during gait and walking on heels. Symptoms and exam consistent with shin splints dx. Discussion with pt to assess change in shoes over past few weeks as contributor to suspected shin splints. Ankle DF and hip IR also deficient on RLE; modified hep to improve these impairments to assess response with shin pain.       PLAN/RECOMMENDATIONS:   Plan for treatment: therapy treatment to continue next visit.  Planned interventions for next visit: continue with current treatment.  3x97110 and 1x97112 per visit for 12 visits

## 2023-09-27 ENCOUNTER — PHYSICAL THERAPY (OUTPATIENT)
Dept: PHYSICAL THERAPY | Facility: MEDICAL CENTER | Age: 38
End: 2023-09-27
Attending: INTERNAL MEDICINE
Payer: MEDICAID

## 2023-09-27 DIAGNOSIS — M25.569 KNEE PAIN, UNSPECIFIED CHRONICITY, UNSPECIFIED LATERALITY: ICD-10-CM

## 2023-09-27 DIAGNOSIS — M25.561 RIGHT KNEE PAIN, UNSPECIFIED CHRONICITY: ICD-10-CM

## 2023-09-27 PROCEDURE — 97110 THERAPEUTIC EXERCISES: CPT

## 2023-09-27 NOTE — OP THERAPY DAILY TREATMENT
Outpatient Physical Therapy  DAILY TREATMENT     [facility]  [address]  [place] [address]  Phone:  [phone]  Fax:  [phone]    Date: 09/27/2023    Patient: Vanessa Crenshaw  YOB: 1985  MRN: [id number]     Time Calculation    Start time: 0945  Stop time: 1029 Time Calculation (min): 44 minutes         Chief Complaint: Knee Problem    Visit #: 6    SUBJECTIVE:  Pt reporting her shin splints resolved. She purchased     OBJECTIVE:  Current objective measures:   TTP ant tibialis and tibialist posterior R   Ankle ROM from the wall:  L: 5 in   R: 4.5 in    Prev session:  Hip rotation: limited R hip IR PROM (90/90 supine position)    Patellar glide: mod hypombility inferior glide to R patella        Objective findings and assessment details:   Hypomobile patella inferior glide R  TTP distal HS insertion R  Staggered sit to stands: increased difficulty R>L       Therapeutic Exercises (CPT 59645):     1. review of hep questions    3. recumbent bike, x7 min L1, cardiovascular warm up    4. incline calf stretch, x60 sec, reviewed    5. piriformis stretch, 60 sec ea, reviewed    6. SLS, 3x20 sec, emphasis on equal level of hips without dip; improved with cuing and incr practice    7. SLS on foam, 3x30 sec ea, incr instability, intermit UE hold onto bar    8. pigeon stretch, 60 sec ea, incr difficulty LLE today; hep    12. SLS on foam pad->SLS on Bosu, 5x 10 sec ea    13. BOSU mini squats blue then black, x10 ea, Fingertips maintained on bar    14. mermaid, x15, reviewed; good carryover, incr difficulty LLE today    15. ankle DF with green TB seated, x15, verbal review      Therapeutic Exercise Summary: Access Code: [email or web address]  URL: [email or web address]  Date: 09/27/2023  Prepared by: Kelvin Isaac    Exercises  - Eccentric Heel Lowering on Step  - 3 x weekly - 1-2 sets - 10-15 reps  - Seated Inferior Patellar Glide   - 2 x daily - 7 x weekly - 1 reps  - Staggered Sit-to-Stand  - 3 x weekly - 1-2 sets - 10-15 reps  - Standing Anterior Posterior Weight Shift  - 3 x weekly - 2-3 sets - 10 reps  - Single Leg Stance on Foam Pad  - 1 x daily - 3 x weekly - 2-3 sets - 10 sec hold  - Mermaid  - 3 x weekly - 1 sets - 10-15 reps  - Standing Gastroc Stretch on Foam 1/2 Roll  - 2 x daily - 7 x weekly - 2-3 sets - 30 sec  hold  - [place] Stretch with TRX®  - 2 x daily - 7 x weekly - 2 sets - 30 sec hold    Pt performed these exercises with instruction and SPV.  Provided handout with these exercises for daily HEP.        Therapeutic Treatments and Modalities:     1. Neuromuscular Re-education (CPT 65064), see below, x8 min    Therapeutic Treatment and Modalities Summary: Neuro re-ed: STM to ant tibialis and post tibialis R with pt in lying     Time-based treatments/modalities:    Physical Therapy Timed Treatment Charges  Therapeutic exercise minutes (CPT 18979): 44 minutes      Pain rating (1-10) before treatment: 0/10  Pain rating (1-10) after treatment:    Pain Comments::  Aggravating: prolonged sitting>after a work shift, prolonged walking>1 mile, stairs (walking on heels), stiffness and weakness in the morning    ASSESSMENT:   Response to treatment:  Pt demonstrating good carryover of exercises and reporting pos impact with change of her walking shoes. Encouraged pt to continue with her hep routine to improve ankle DF and hip IR ROM impairments. Will follow up in 2 weeks to assess whether cont'd ADL impairments or symptoms persist.     PLAN/RECOMMENDATIONS:   Plan for treatment: therapy treatment to continue next visit.  Planned interventions for next visit: continue with current treatment.  3x97110 and 1x97112 per visit for 12 visits

## 2023-10-03 ENCOUNTER — APPOINTMENT (OUTPATIENT)
Dept: PHYSICAL THERAPY | Facility: MEDICAL CENTER | Age: 38
End: 2023-10-03
Attending: INTERNAL MEDICINE
Payer: MEDICAID

## 2023-10-04 ENCOUNTER — APPOINTMENT (OUTPATIENT)
Dept: PHYSICAL THERAPY | Facility: MEDICAL CENTER | Age: 38
End: 2023-10-04
Attending: INTERNAL MEDICINE
Payer: MEDICAID

## 2023-10-11 ENCOUNTER — PHYSICAL THERAPY (OUTPATIENT)
Dept: PHYSICAL THERAPY | Facility: MEDICAL CENTER | Age: 38
End: 2023-10-11
Attending: INTERNAL MEDICINE
Payer: MEDICAID

## 2023-10-11 DIAGNOSIS — M25.561 RIGHT KNEE PAIN, UNSPECIFIED CHRONICITY: ICD-10-CM

## 2023-10-11 DIAGNOSIS — M25.569 KNEE PAIN, UNSPECIFIED CHRONICITY, UNSPECIFIED LATERALITY: ICD-10-CM

## 2023-10-11 PROCEDURE — 97110 THERAPEUTIC EXERCISES: CPT

## 2023-10-11 NOTE — OP THERAPY DISCHARGE SUMMARY
Outpatient Physical Therapy  DISCHARGE SUMMARY NOTE      [facility]  [address]  [facility] [address]  Phone:  [phone]  Fax:  [phone]    Date of Visit: 10/11/2023    Patient: Vanessa Crenshaw  YOB: 1985  MRN: [id number]     Referring Provider: Vanessa Carlson M.D.  [address] [place] [id number]   Referring Diagnosis Pain in right knee [M25.561]         Functional Assessment Used  WOMAC Grand Total: 10.42     Your patient is being discharged from Physical Therapy with the following comments:   Goals met    Comments:  Pt has been seen for 7 visits. She has demonstrated sig improvements in therapy and is now able to navigate stairs without walking on heels. She is able to sit and stand for prolonged periods without complaints. Overall improvement, especially with recent change of her shoewear. She did present today reporting she had some intermittent burning inferior to R knee. There are no clear aggravating factors but believes she may have overdone it at the gym. Discussion today re: managing and pacing load at the gym and continuation of her current hep. Review of squat and lunge mechanics and review of hep. Pt is currently indep with her hep. Anticipate pt will continue to do well if compliance with ex. Pt will be DC-ed today and will follow up with PCP if burning does not resolve.     Objective:  TTP ant tibialis and tibialist posterior R   Ankle ROM from the wall:  L: 5 in   R: 4.5 in    Prev session:  Hip rotation: limited R hip IR PROM (90/90 supine position)  20 deg IR     Patellar glide: mod hypombility inferior glide to R patella    WOMAC Grand Total: 10.42     Objective findings and assessment details:   Hypomobile patella inferior glide R  TTP distal HS insertion R  Staggered sit to stands: increased difficulty R>L     Goals:   Short Term Goals:   1. Pt will be independent with written HEP.  (Met)  2. Pt will be able to progress 1/3 of hep. (Ongoing)  Short term goal time span:  2-4 weeks      Long Term Goals:    1. Pt will be independent with written HEP.  2. Pt will have a sig improvement in WOMAC score >/= MDC (eval: 45.83) (met)  3. Pt will be able to return to gym for LE workouts with min to no modifications at least 3x/wk using weight machines to improve QOL. (Met)  4. Pt will be able to navigate stairs with step through gait pattern and no incr in s/s at least 70% of the time or greater. (Met)  Long term goal time span:  6-8 weeks      Recommendations:  Pt has satisfied above listed goals and is ready to DC today with independent hep. All questions answered and verbally reviewed independent hep with pt. Expressed to pt that she can return to skilled physical therapy if condition should change or worsen in future.      Kelvin Isaac, PT    Date: 10/11/2023

## 2023-10-11 NOTE — OP THERAPY DAILY TREATMENT
Outpatient Physical Therapy  DAILY TREATMENT     [facility]  [address]  [place] [address]  Phone:  [phone]  Fax:  [phone]    Date: 10/11/2023    Patient: Vanessa Crenshaw  YOB: 1985  MRN: [id number]     Time Calculation    Start time: 1546  Stop time: 1628 Time Calculation (min): 42 minutes         Chief Complaint: Knee Problem    Visit #: 7    SUBJECTIVE:  Has noticed some burning at the knee, suspects it may have happened after the gym but has occasional         OBJECTIVE:  Current objective measures:   See DC note       Therapeutic Exercises (CPT 67672):     1. review of hep questions    3. recumbent bike, x7 min L1, cardiovascular warm up    4. incline calf stretch, x60 sec, reviewed    5. piriformis stretch, 60 sec ea, reviewed    6. SLS, 3x20 sec, emphasis on equal level of hips without dip; improved with cuing and incr practice    7. SLS on foam, 3x30 sec ea, incr instability, intermit UE hold onto bar    8. pigeon stretch, 60 sec ea, incr difficulty LLE today; hep    12. SLS on foam pad->SLS on Bosu, 5x 10 sec ea    13. BOSU mini squats blue then black, x10 ea, Fingertips maintained on bar    14. mermaid, x15, reviewed; good carryover, incr difficulty LLE today    15. ankle DF with green TB seated, x15, verbal review    17. WOMAC completion    18. review of hep and goals      Therapeutic Exercise Summary: Access Code: [email or web address]  URL: [email or web address]  Date: 09/27/2023  Prepared by: Kelvin Isaac    Exercises  - Eccentric Heel Lowering on Step  - 3 x weekly - 1-2 sets - 10-15 reps  - Seated Inferior Patellar Glide  - 2 x daily - 7 x weekly - 1 reps  - Staggered Sit-to-Stand  - 3 x weekly - 1-2 sets - 10-15 reps  - Standing Anterior Posterior Weight Shift  - 3 x weekly - 2-3 sets - 10 reps  - Single Leg Stance on Foam Pad  - 1 x daily - 3 x weekly - 2-3 sets - 10 sec hold  - Mermaid  - 3 x  weekly - 1 sets - 10-15 reps  - Standing Gastroc Stretch on Foam 1/2 Roll  - 2 x daily - 7 x weekly - 2-3 sets - 30 sec  hold  - [place] Stretch with TRX®  - 2 x daily - 7 x weekly - 2 sets - 30 sec hold    Pt performed these exercises with instruction and SPV.  Provided handout with these exercises for daily HEP.        Therapeutic Treatments and Modalities:     1. Neuromuscular Re-education (CPT 82614), see below, x8 min    Therapeutic Treatment and Modalities Summary: Neuro re-ed: STM to ant tibialis and post tibialis R with pt in lying     Time-based treatments/modalities:    Physical Therapy Timed Treatment Charges  Therapeutic exercise minutes (CPT 00810): 42 minutes      Pain rating (1-10) before treatment: 0/10    ASSESSMENT:   Response to treatment:  See DC note    PLAN/RECOMMENDATIONS:   Plan for treatment: DC with indep hep

## 2023-10-17 ENCOUNTER — APPOINTMENT (OUTPATIENT)
Dept: PHYSICAL THERAPY | Facility: MEDICAL CENTER | Age: 38
End: 2023-10-17
Attending: INTERNAL MEDICINE
Payer: MEDICAID

## 2023-10-25 ENCOUNTER — APPOINTMENT (OUTPATIENT)
Dept: PHYSICAL THERAPY | Facility: MEDICAL CENTER | Age: 38
End: 2023-10-25
Attending: INTERNAL MEDICINE
Payer: MEDICAID

## 2023-10-31 ENCOUNTER — APPOINTMENT (OUTPATIENT)
Dept: PHYSICAL THERAPY | Facility: MEDICAL CENTER | Age: 38
End: 2023-10-31
Attending: INTERNAL MEDICINE
Payer: MEDICAID

## 2023-12-25 ENCOUNTER — HOSPITAL ENCOUNTER (INPATIENT)
Facility: MEDICAL CENTER | Age: 38
LOS: 6 days | DRG: 516 | End: 2024-01-02
Attending: EMERGENCY MEDICINE | Admitting: ORTHOPAEDIC SURGERY
Payer: MEDICAID

## 2023-12-25 ENCOUNTER — HOSPITAL ENCOUNTER (OUTPATIENT)
Dept: RADIOLOGY | Facility: MEDICAL CENTER | Age: 38
End: 2023-12-25
Payer: MEDICAID

## 2023-12-25 DIAGNOSIS — S32.9XXA CLOSED DISPLACED FRACTURE OF PELVIS, UNSPECIFIED PART OF PELVIS, INITIAL ENCOUNTER (HCC): ICD-10-CM

## 2023-12-25 DIAGNOSIS — T14.8XXA CRUSH INJURY: ICD-10-CM

## 2023-12-25 DIAGNOSIS — S32.810A MULTIPLE CLOSED PELVIC FRACTURES WITH DISRUPTION OF PELVIC CIRCLE, INITIAL ENCOUNTER (HCC): ICD-10-CM

## 2023-12-25 PROBLEM — T14.90XA TRAUMA: Status: ACTIVE | Noted: 2023-12-25

## 2023-12-25 LAB
ABO GROUP BLD: NORMAL
ALBUMIN SERPL BCP-MCNC: 3.8 G/DL (ref 3.2–4.9)
ALBUMIN/GLOB SERPL: 1.6 G/DL
ALP SERPL-CCNC: 61 U/L (ref 30–99)
ALT SERPL-CCNC: 19 U/L (ref 2–50)
ANION GAP SERPL CALC-SCNC: 10 MMOL/L (ref 7–16)
APTT PPP: 24.6 SEC (ref 24.7–36)
AST SERPL-CCNC: 26 U/L (ref 12–45)
BILIRUB SERPL-MCNC: 0.6 MG/DL (ref 0.1–1.5)
BLD GP AB SCN SERPL QL: NORMAL
BUN SERPL-MCNC: 15 MG/DL (ref 8–22)
CALCIUM ALBUM COR SERPL-MCNC: 8.6 MG/DL (ref 8.5–10.5)
CALCIUM SERPL-MCNC: 8.4 MG/DL (ref 8.5–10.5)
CHLORIDE SERPL-SCNC: 107 MMOL/L (ref 96–112)
CO2 SERPL-SCNC: 22 MMOL/L (ref 20–33)
CREAT SERPL-MCNC: 0.63 MG/DL (ref 0.5–1.4)
ERYTHROCYTE [DISTWIDTH] IN BLOOD BY AUTOMATED COUNT: 49.9 FL (ref 35.9–50)
ETHANOL BLD-MCNC: <10.1 MG/DL
GFR SERPLBLD CREATININE-BSD FMLA CKD-EPI: 115 ML/MIN/1.73 M 2
GLOBULIN SER CALC-MCNC: 2.4 G/DL (ref 1.9–3.5)
GLUCOSE SERPL-MCNC: 112 MG/DL (ref 65–99)
HBA1C MFR BLD: 11.7 % (ref ?–5.8)
HCG SERPL QL: NEGATIVE
HCT VFR BLD AUTO: 36.9 % (ref 37–47)
HGB BLD-MCNC: 11.3 G/DL (ref 12–16)
INR PPP: 1.04 (ref 0.87–1.13)
MCH RBC QN AUTO: 24.2 PG (ref 27–33)
MCHC RBC AUTO-ENTMCNC: 30.6 G/DL (ref 32.2–35.5)
MCV RBC AUTO: 79.2 FL (ref 81.4–97.8)
PLATELET # BLD AUTO: 190 K/UL (ref 164–446)
PMV BLD AUTO: 8.9 FL (ref 9–12.9)
POTASSIUM SERPL-SCNC: 4.3 MMOL/L (ref 3.6–5.5)
PROT SERPL-MCNC: 6.2 G/DL (ref 6–8.2)
PROTHROMBIN TIME: 13.7 SEC (ref 12–14.6)
RBC # BLD AUTO: 4.66 M/UL (ref 4.2–5.4)
RH BLD: NORMAL
SODIUM SERPL-SCNC: 139 MMOL/L (ref 135–145)
WBC # BLD AUTO: 11.1 K/UL (ref 4.8–10.8)

## 2023-12-25 PROCEDURE — 700111 HCHG RX REV CODE 636 W/ 250 OVERRIDE (IP): Mod: JZ,UD | Performed by: EMERGENCY MEDICINE

## 2023-12-25 PROCEDURE — 82077 ASSAY SPEC XCP UR&BREATH IA: CPT

## 2023-12-25 PROCEDURE — A9270 NON-COVERED ITEM OR SERVICE: HCPCS | Mod: UD | Performed by: ORTHOPAEDIC SURGERY

## 2023-12-25 PROCEDURE — 700111 HCHG RX REV CODE 636 W/ 250 OVERRIDE (IP): Mod: UD | Performed by: ORTHOPAEDIC SURGERY

## 2023-12-25 PROCEDURE — 80053 COMPREHEN METABOLIC PANEL: CPT

## 2023-12-25 PROCEDURE — 305948 HCHG GREEN TRAUMA ACT PRE-NOTIFY NO CC

## 2023-12-25 PROCEDURE — 96375 TX/PRO/DX INJ NEW DRUG ADDON: CPT

## 2023-12-25 PROCEDURE — 85027 COMPLETE CBC AUTOMATED: CPT

## 2023-12-25 PROCEDURE — G0378 HOSPITAL OBSERVATION PER HR: HCPCS

## 2023-12-25 PROCEDURE — 85610 PROTHROMBIN TIME: CPT

## 2023-12-25 PROCEDURE — 86900 BLOOD TYPING SEROLOGIC ABO: CPT

## 2023-12-25 PROCEDURE — 85730 THROMBOPLASTIN TIME PARTIAL: CPT

## 2023-12-25 PROCEDURE — 84703 CHORIONIC GONADOTROPIN ASSAY: CPT

## 2023-12-25 PROCEDURE — 96374 THER/PROPH/DIAG INJ IV PUSH: CPT

## 2023-12-25 PROCEDURE — 36415 COLL VENOUS BLD VENIPUNCTURE: CPT

## 2023-12-25 PROCEDURE — 96372 THER/PROPH/DIAG INJ SC/IM: CPT | Mod: XU

## 2023-12-25 PROCEDURE — 86901 BLOOD TYPING SEROLOGIC RH(D): CPT

## 2023-12-25 PROCEDURE — 86850 RBC ANTIBODY SCREEN: CPT

## 2023-12-25 PROCEDURE — 700102 HCHG RX REV CODE 250 W/ 637 OVERRIDE(OP): Mod: UD | Performed by: ORTHOPAEDIC SURGERY

## 2023-12-25 PROCEDURE — 99285 EMERGENCY DEPT VISIT HI MDM: CPT

## 2023-12-25 RX ORDER — ALBUTEROL SULFATE 90 UG/1
2 AEROSOL, METERED RESPIRATORY (INHALATION) EVERY 4 HOURS PRN
COMMUNITY
Start: 2023-05-16 | End: 2024-05-14

## 2023-12-25 RX ORDER — ACETAMINOPHEN 500 MG
1000 TABLET ORAL EVERY 6 HOURS
Status: DISPENSED | OUTPATIENT
Start: 2023-12-25 | End: 2023-12-30

## 2023-12-25 RX ORDER — ACETAMINOPHEN 500 MG
1000 TABLET ORAL EVERY 6 HOURS PRN
Status: DISCONTINUED | OUTPATIENT
Start: 2023-12-30 | End: 2024-01-02 | Stop reason: HOSPADM

## 2023-12-25 RX ORDER — ENOXAPARIN SODIUM 100 MG/ML
40 INJECTION SUBCUTANEOUS DAILY
Status: DISCONTINUED | OUTPATIENT
Start: 2023-12-25 | End: 2024-01-02 | Stop reason: HOSPADM

## 2023-12-25 RX ORDER — PREDNISONE 20 MG/1
20 TABLET ORAL DAILY
Status: ON HOLD | COMMUNITY
Start: 2023-12-06 | End: 2024-01-02

## 2023-12-25 RX ORDER — MORPHINE SULFATE 4 MG/ML
INJECTION INTRAVENOUS
Status: COMPLETED | OUTPATIENT
Start: 2023-12-25 | End: 2023-12-25

## 2023-12-25 RX ORDER — HYDROMORPHONE HYDROCHLORIDE 1 MG/ML
0.5 INJECTION, SOLUTION INTRAMUSCULAR; INTRAVENOUS; SUBCUTANEOUS
Status: DISCONTINUED | OUTPATIENT
Start: 2023-12-25 | End: 2023-12-29

## 2023-12-25 RX ORDER — LORATADINE 10 MG/1
1 TABLET ORAL
COMMUNITY
Start: 2023-12-06

## 2023-12-25 RX ORDER — IBUPROFEN 800 MG/1
800 TABLET ORAL 3 TIMES DAILY PRN
Status: DISCONTINUED | OUTPATIENT
Start: 2023-12-28 | End: 2023-12-29

## 2023-12-25 RX ORDER — FLUTICASONE PROPIONATE 50 MCG
2 SPRAY, SUSPENSION (ML) NASAL
COMMUNITY

## 2023-12-25 RX ORDER — KETOROLAC TROMETHAMINE 30 MG/ML
15 INJECTION, SOLUTION INTRAMUSCULAR; INTRAVENOUS EVERY 6 HOURS
Status: DISPENSED | OUTPATIENT
Start: 2023-12-25 | End: 2023-12-28

## 2023-12-25 RX ORDER — DEXTROMETHORPHAN HBR. AND GUAIFENESIN 10; 100 MG/5ML; MG/5ML
10 SOLUTION ORAL EVERY 4 HOURS PRN
Status: ON HOLD | COMMUNITY
Start: 2023-11-30 | End: 2024-01-02

## 2023-12-25 RX ORDER — OXYCODONE HYDROCHLORIDE 10 MG/1
10 TABLET ORAL
Status: DISCONTINUED | OUTPATIENT
Start: 2023-12-25 | End: 2024-01-02 | Stop reason: HOSPADM

## 2023-12-25 RX ORDER — OXYCODONE HYDROCHLORIDE 5 MG/1
5 TABLET ORAL
Status: DISCONTINUED | OUTPATIENT
Start: 2023-12-25 | End: 2024-01-02 | Stop reason: HOSPADM

## 2023-12-25 RX ORDER — PSEUDOEPHEDRINE HCL 30 MG
60 TABLET ORAL EVERY 4 HOURS PRN
COMMUNITY
Start: 2023-11-30

## 2023-12-25 RX ADMIN — KETOROLAC TROMETHAMINE 15 MG: 30 INJECTION, SOLUTION INTRAMUSCULAR; INTRAVENOUS at 18:47

## 2023-12-25 RX ADMIN — ENOXAPARIN SODIUM 40 MG: 100 INJECTION SUBCUTANEOUS at 18:52

## 2023-12-25 RX ADMIN — ACETAMINOPHEN 1000 MG: 500 TABLET ORAL at 18:49

## 2023-12-25 RX ADMIN — MORPHINE SULFATE 4 MG: 4 INJECTION, SOLUTION INTRAMUSCULAR; INTRAVENOUS at 17:48

## 2023-12-25 RX ADMIN — OXYCODONE HYDROCHLORIDE 10 MG: 10 TABLET ORAL at 20:55

## 2023-12-25 ASSESSMENT — PAIN DESCRIPTION - PAIN TYPE: TYPE: ACUTE PAIN

## 2023-12-25 ASSESSMENT — LIFESTYLE VARIABLES: DO YOU DRINK ALCOHOL: NO

## 2023-12-26 LAB — ABO + RH BLD: NORMAL

## 2023-12-26 PROCEDURE — A9270 NON-COVERED ITEM OR SERVICE: HCPCS | Mod: UD | Performed by: ORTHOPAEDIC SURGERY

## 2023-12-26 PROCEDURE — 700102 HCHG RX REV CODE 250 W/ 637 OVERRIDE(OP): Mod: UD | Performed by: ORTHOPAEDIC SURGERY

## 2023-12-26 PROCEDURE — 96372 THER/PROPH/DIAG INJ SC/IM: CPT

## 2023-12-26 PROCEDURE — 97163 PT EVAL HIGH COMPLEX 45 MIN: CPT

## 2023-12-26 PROCEDURE — 97535 SELF CARE MNGMENT TRAINING: CPT

## 2023-12-26 PROCEDURE — 36415 COLL VENOUS BLD VENIPUNCTURE: CPT

## 2023-12-26 PROCEDURE — G0378 HOSPITAL OBSERVATION PER HR: HCPCS

## 2023-12-26 PROCEDURE — 51798 US URINE CAPACITY MEASURE: CPT

## 2023-12-26 PROCEDURE — 700102 HCHG RX REV CODE 250 W/ 637 OVERRIDE(OP): Mod: UD | Performed by: PHYSICIAN ASSISTANT

## 2023-12-26 PROCEDURE — A9270 NON-COVERED ITEM OR SERVICE: HCPCS | Mod: UD | Performed by: PHYSICIAN ASSISTANT

## 2023-12-26 PROCEDURE — 700111 HCHG RX REV CODE 636 W/ 250 OVERRIDE (IP): Mod: UD | Performed by: ORTHOPAEDIC SURGERY

## 2023-12-26 PROCEDURE — 96376 TX/PRO/DX INJ SAME DRUG ADON: CPT

## 2023-12-26 PROCEDURE — 97167 OT EVAL HIGH COMPLEX 60 MIN: CPT

## 2023-12-26 RX ORDER — TAMSULOSIN HYDROCHLORIDE 0.4 MG/1
0.4 CAPSULE ORAL
Status: DISCONTINUED | OUTPATIENT
Start: 2023-12-26 | End: 2024-01-02 | Stop reason: HOSPADM

## 2023-12-26 RX ADMIN — OXYCODONE HYDROCHLORIDE 10 MG: 10 TABLET ORAL at 20:52

## 2023-12-26 RX ADMIN — KETOROLAC TROMETHAMINE 15 MG: 30 INJECTION, SOLUTION INTRAMUSCULAR; INTRAVENOUS at 11:24

## 2023-12-26 RX ADMIN — ACETAMINOPHEN 1000 MG: 500 TABLET ORAL at 04:45

## 2023-12-26 RX ADMIN — KETOROLAC TROMETHAMINE 15 MG: 30 INJECTION, SOLUTION INTRAMUSCULAR; INTRAVENOUS at 04:45

## 2023-12-26 RX ADMIN — ENOXAPARIN SODIUM 40 MG: 100 INJECTION SUBCUTANEOUS at 16:18

## 2023-12-26 RX ADMIN — OXYCODONE HYDROCHLORIDE 10 MG: 10 TABLET ORAL at 10:28

## 2023-12-26 RX ADMIN — OXYCODONE HYDROCHLORIDE 10 MG: 10 TABLET ORAL at 04:45

## 2023-12-26 RX ADMIN — HYDROMORPHONE HYDROCHLORIDE 0.5 MG: 1 INJECTION, SOLUTION INTRAMUSCULAR; INTRAVENOUS; SUBCUTANEOUS at 07:41

## 2023-12-26 RX ADMIN — OXYCODONE HYDROCHLORIDE 10 MG: 10 TABLET ORAL at 00:12

## 2023-12-26 RX ADMIN — KETOROLAC TROMETHAMINE 15 MG: 30 INJECTION, SOLUTION INTRAMUSCULAR; INTRAVENOUS at 00:12

## 2023-12-26 RX ADMIN — KETOROLAC TROMETHAMINE 15 MG: 30 INJECTION, SOLUTION INTRAMUSCULAR; INTRAVENOUS at 16:19

## 2023-12-26 RX ADMIN — ACETAMINOPHEN 1000 MG: 500 TABLET ORAL at 00:11

## 2023-12-26 RX ADMIN — OXYCODONE HYDROCHLORIDE 10 MG: 10 TABLET ORAL at 14:02

## 2023-12-26 RX ADMIN — ACETAMINOPHEN 1000 MG: 500 TABLET ORAL at 11:24

## 2023-12-26 RX ADMIN — TAMSULOSIN HYDROCHLORIDE 0.4 MG: 0.4 CAPSULE ORAL at 11:24

## 2023-12-26 RX ADMIN — OXYCODONE HYDROCHLORIDE 10 MG: 10 TABLET ORAL at 17:23

## 2023-12-26 ASSESSMENT — PAIN DESCRIPTION - PAIN TYPE
TYPE: ACUTE PAIN

## 2023-12-26 ASSESSMENT — COGNITIVE AND FUNCTIONAL STATUS - GENERAL
DRESSING REGULAR LOWER BODY CLOTHING: A LITTLE
TOILETING: A LOT
MOVING TO AND FROM BED TO CHAIR: UNABLE
CLIMB 3 TO 5 STEPS WITH RAILING: A LOT
CLIMB 3 TO 5 STEPS WITH RAILING: TOTAL
STANDING UP FROM CHAIR USING ARMS: A LITTLE
MOVING TO AND FROM BED TO CHAIR: A LOT
MOVING FROM LYING ON BACK TO SITTING ON SIDE OF FLAT BED: UNABLE
DAILY ACTIVITIY SCORE: 15
SUGGESTED CMS G CODE MODIFIER DAILY ACTIVITY: CJ
MOVING FROM LYING ON BACK TO SITTING ON SIDE OF FLAT BED: A LOT
WALKING IN HOSPITAL ROOM: A LITTLE
WALKING IN HOSPITAL ROOM: A LOT
MOBILITY SCORE: 11
DAILY ACTIVITIY SCORE: 22
SUGGESTED CMS G CODE MODIFIER DAILY ACTIVITY: CK
DRESSING REGULAR LOWER BODY CLOTHING: A LOT
SUGGESTED CMS G CODE MODIFIER MOBILITY: CL
EATING MEALS: A LITTLE
DRESSING REGULAR UPPER BODY CLOTHING: A LITTLE
TURNING FROM BACK TO SIDE WHILE IN FLAT BAD: A LOT
PERSONAL GROOMING: A LITTLE
MOBILITY SCORE: 13
HELP NEEDED FOR BATHING: A LOT
TOILETING: A LITTLE
SUGGESTED CMS G CODE MODIFIER MOBILITY: CL
TURNING FROM BACK TO SIDE WHILE IN FLAT BAD: A LITTLE
STANDING UP FROM CHAIR USING ARMS: A LOT

## 2023-12-26 ASSESSMENT — FIBROSIS 4 INDEX: FIB4 SCORE: 1.26

## 2023-12-26 ASSESSMENT — GAIT ASSESSMENTS: GAIT LEVEL OF ASSIST: UNABLE TO PARTICIPATE

## 2023-12-26 ASSESSMENT — ACTIVITIES OF DAILY LIVING (ADL): TOILETING: INDEPENDENT

## 2023-12-26 NOTE — CARE PLAN
The patient is Stable - Low risk of patient condition declining or worsening    Shift Goals  Clinical Goals: pain cintrol, sleep  Patient Goals: pain control  Family Goals: helga    Progress made toward(s) clinical / shift goals:     Problem: Pain - Standard  Goal: Alleviation of pain or a reduction in pain to the patient’s comfort goal  Outcome: Progressing     Problem: Knowledge Deficit - Standard  Goal: Patient and family/care givers will demonstrate understanding of plan of care, disease process/condition, diagnostic tests and medications  Outcome: Progressing       Pain controlled by current pain regiment. Patient verbalize understanding of POC.  Patient is not progressing towards the following goals:

## 2023-12-26 NOTE — ED PROVIDER NOTES
ED Provider Note    CHIEF COMPLAINT  Chief Complaint   Patient presents with    Trauma Green     PT was at [place], her  and her were standing on a log, attempted to roll log along beach with feet. The PT tripped and then log preceded to roll over left leg. PT found to have pelvic fx at [facility]. GCS 15. Aox4. CMS intact, some decreased sensation in LLE. 1730-50mcg fentanyl enroute.        EXTERNAL RECORDS REVIEWED  External ED Note Transferred from [facility]     CT pelvis with contrast minimally to mild displaced fractures including the right pubic bone with extension of the right superior pubic ramus bilateral inferior pubic rami superior pubic rami at the anterior acetabulum without definite intra-articular extension.  Complex fracture involving the S1 vertebral body extending posteriorly to involve the neural foramen with fracture line appearing to extend posteriorly about the posterior elements and spinous process.  Bilateral pelvic hematomas right greater than left which are small without active extra    CTA of the left lower extremity had good runoff no evidence of large vessel injury or fractures involving the femur knee or tibia    HPI/ROS  LIMITATION TO HISTORY   Select: : None  OUTSIDE HISTORIAN(S):  EMS patient was on the beach in [facility] earlier today when a giant log rolled over and crushed her mostly on the left leg area.  Patient is received multiple rounds of pain management needed a little bit oxygen secondary to multiple doses of opioids and in route but has been hemodynamically stable.    Vanessa Crenshaw is a 38 y.o. female who presents to the [facility] as a transfer for multiple pelvic fractures.  The patient states her pain is currently about an 8 out of 10.  She takes multivitamins but does not take any other medications on a regular basis.  She endorses little bit of numbness to the left lower extremity and severe discomfort in her low  back radiating down the left leg.  States when they originally happened she thinks that her kneecap was dislocated because she said she looked down at her knee and it was bent and it looked like an M and then her  popped her knee Into place.  She is from [place] which is why she requested transfer here.    PAST MEDICAL HISTORY   has a past medical history of Asthma, Dental disorder, and Psychiatric problem.    SURGICAL HISTORY   has a past surgical history that includes hysteroscopy with myosure (4/21/2021); dilation and curettage (4/21/2021); and abdominal exploration.    FAMILY HISTORY  Family History   Problem Relation Age of Onset    Diabetes Mother     Hypertension Father     Heart Disease Father        SOCIAL HISTORY  Social History     Tobacco Use    Smoking status: Every Day     Current packs/day: 1.00     Types: Cigarettes    Smokeless tobacco: Never   Vaping Use    Vaping Use: Never used   Substance and Sexual Activity    Alcohol use: Yes     Comment: 6 week    Drug use: Yes     Types: Inhaled     Comment: meth hx, marajuana occassionally on Sunday 4/18    Sexual activity: Yes       CURRENT MEDICATIONS  Home Medications    **Home medications have not yet been reviewed for this encounter**         ALLERGIES  Allergies   Allergen Reactions    Molds & Smuts      Pollens and molds; seasonal allergies       PHYSICAL EXAM  VITAL SIGNS: BP (!) 148/98   Pulse 88   Temp 37.3 °C (99.1 °F)   Resp 16   SpO2 94%    Pulse OX: Pulse Oxygen level is normal  Constitutional: Alert in no apparent distress.  HENT: Normocephalic, Atraumatic, MMM  Eyes: PERound. Conjunctiva normal, non-icteric.   Heart: Regular rate and rhythm, intact distal pulses DP pulses 2+ bilaterally  Lungs: Symmetrical movement, no resp distress   Abdomen: Non-tender, non-distended, normal bowel sounds  EXT/Back back was not examined secondary to tenderness, ecchymosis from the left ankle all the way along the lateral aspect of the tib-fib knee  thigh and hip mild abrasion to left foot  Skin: Warm, Dry, No erythema, No rash.   Neurologic: Alert and oriented, Grossly non-focal.  Sensation is intact light touch distally but states diminished on the left compared to right      DIAGNOSTIC STUDIES / PROCEDURES      LABS  Labs Reviewed   COMP METABOLIC PANEL - Abnormal; Notable for the following components:       Result Value    Glucose 112 (*)     Calcium 8.4 (*)     All other components within normal limits   CBC WITHOUT DIFFERENTIAL - Abnormal; Notable for the following components:    WBC 11.1 (*)     Hemoglobin 11.3 (*)     Hematocrit 36.9 (*)     MCV 79.2 (*)     MCH 24.2 (*)     MCHC 30.6 (*)     MPV 8.9 (*)     All other components within normal limits   DIAGNOSTIC ALCOHOL   HCG QUAL SERUM   COD (ADULT)   ESTIMATED GFR   COMPONENT CELLULAR   ABO RH CONFIRM   PROTHROMBIN TIME   APTT         RADIOLOGY    Radiologist interpretation:     OUTSIDE IMAGES-CT HIP   Final Result      OUTSIDE IMAGES-CT EXTREMITY LOWER   Final Result            COURSE & MEDICAL DECISION MAKING    ED Observation Status? No; Patient does not meet criteria for ED Observation.     INITIAL ASSESSMENT, COURSE AND PLAN  Care Narrative:     Patient is a 38-year-old female presents emerged part as a transfer for multiple pelvic fractures and trauma.  There is no other trauma beyond the pelvic fractures and contusions.  I will discuss the case with orthopedics primarily and then determine whether he requires trauma or his service hospitalizations    Patient is hemodynamically stable will be treated with morphine for her current pain  DISPOSITION AND DISCUSSIONS  6:25 PM  Spoke w DR Sarmiento who has reviewed the patient's images.  States she does not require or this evening.  Will hospitalize her to his service for pain management.  I reassessed and discussed this with the patient at the bedside.  And she feels comfortable with the plan of being hospitalized.      I have discussed management  of the patient with the following physicians and FACUNDO's:  DR Sarmiento    Discussion of management with other hospitals or appropriate source(s): None       FINAL DIAGNOSIS  1. Closed displaced fracture of pelvis, unspecified part of pelvis, initial encounter (HCC)    2. Crush injury           Electronically signed by: Delmy Landaverde M.D., 12/25/2023 5:38 PM

## 2023-12-26 NOTE — DISCHARGE PLANNING
Case Management Discharge Planning    Admission Date: 12/25/2023  GMLOS:    ALOS: 0    6-Clicks ADL Score: 15  6-Clicks Mobility Score: 11  PT and/or OT Eval ordered: Yes  Post-acute Referrals Ordered: Yes  Post-acute Choice Obtained: No  Has referral(s) been sent to post-acute provider:  Yes      Anticipated Discharge Dispo: Discharge Disposition: Disch to IP rehab facility or distinct part unit (62)    DME Needed: No    Action(s) Taken: DC Assessment Complete (See below)    LSW met with patient at bedside to complete dc assessment. Patient presented as alert as evidenced by her ability to follow conversation regarding her dc plan. Patient verified demographic information on facesheet. Patient reported [address] [place] [address]. Patient reported to live in an apartment with her partner that has 10-14 PALMER. Patient reported to be independent with ADLs/IADLs prior to admission. Patient denied a hx of mental health or substance abuse.     Therapy recommendations are post acute placement. However, patient is in observation status, disqualifying her from SNF placement. LSW requested a PMR consult during IDT rounds. Pending PMR consult. Plan of care ongoing.     Escalations Completed: None    Medically Clear: No    Next Steps: Pending PMR consult.    Barriers to Discharge: Medical clearance and Pending Placement    Is the patient up for discharge tomorrow: No      Care Transition Team Assessment    Information Source  Orientation Level: Oriented X4  Information Given By: Patient  Who is responsible for making decisions for patient? : Patient    Readmission Evaluation  Is this a readmission?: No    Elopement Risk  Legal Hold: No  Ambulatory or Self Mobile in Wheelchair: No-Not an Elopement Risk  Elopement Risk: Not at Risk for Elopement    Interdisciplinary Discharge Planning  Lives with - Patient's Self Care Capacity: Significant Other  Housing / Facility: 2 Story Apartment / Condo ([facility]  on 2nd story of duplex)  Prior Services: Home-Independent    Discharge Preparedness  What is your plan after discharge?: Uncertain - pending medical team collaboration  What are your discharge supports?: Partner  Prior Functional Level: Ambulatory, Drives Self, Independent with Activities of Daily Living, Independent with Medication Management  Difficulity with ADLs: None  Difficulity with IADLs: None    Functional Assesment  Prior Functional Level: Ambulatory, Drives Self, Independent with Activities of Daily Living, Independent with Medication Management    Finances  Financial Barriers to Discharge: No  Prescription Coverage: Yes              Advance Directive  Advance Directive?: None    Domestic Abuse  Have you ever been the victim of abuse or violence?: No    Psychological Assessment  History of Substance Abuse: None (denies)  History of Psychiatric Problems: No (denies)  Non-compliant with Treatment: No  Newly Diagnosed Illness: No    Discharge Risks or Barriers  Discharge risks or barriers?: No    Anticipated Discharge Information  Discharge Disposition: Disch to IP rehab facility or distinct part unit (62)

## 2023-12-26 NOTE — ED NOTES
[place] her were standing on a log, attempted to roll log along beach with feet. The PT tripped and then log preceded to roll over left leg. PT found to have pelvic fx at [facility]. GCS 15. Aox4. CMS intact, some decreased sensation in LLE. 1730-50mcg fentanyl enroute.

## 2023-12-26 NOTE — THERAPY
"Occupational Therapy   Initial Evaluation     Patient Name: Vanessa Crenshaw  Age:  38 y.o., Sex:  female  Medical Record #: [id number]  Today's Date: 12/26/2023     Precautions  Precautions: Fall Risk, Weight Bearing As Tolerated Right Lower Extremity, Toe Touch Weight Bearing Left Lower Extremity  Comments: pelvic fxs    Assessment  Patient is 38 y.o. female admitted after falling from a log, sustaining a pelvic ring injury managed non-op at this time.  Additional factors influencing patient status / progress: weakness, fatigue, impaired balance, pain.      Plan    Occupational Therapy Initial Treatment Plan   Treatment Interventions: Self Care / Activities of Daily Living, Adaptive Equipment, Neuro Re-Education / Balance, Therapeutic Exercises, Therapeutic Activity  Treatment Frequency: 5 Times per Week  Duration: Until Therapy Goals Met    DC Equipment Recommendations: Unable to determine at this time  Discharge Recommendations: Recommend post-acute placement for additional occupational therapy services prior to discharge home; PM&R consult    Subjective    \"Okay so I'm not going to hurt myself by doing this?\"     Objective       12/26/23 0912   Prior Living Situation   Prior Services Home-Independent   Housing / Facility 2 Story Apartment / Condo   Steps Into Home 20   Bathroom Set up Bathtub / Shower Combination;Shower Glass Doors   Equipment Owned None   Lives with - Patient's Self Care Capacity Significant Other   Comments Pt lives with her SO \"PJ\". She reports she has other family members that she may be able to stay with upon d/c as she doesn't think her current home is suitable   Prior Level of ADL Function   Self Feeding Independent   Grooming / Hygiene Independent   Bathing Independent   Dressing Independent   Toileting Independent   Prior Level of IADL Function   Medication Management Independent   Laundry Independent   Kitchen Mobility Independent   Finances Independent   Home Management " Independent   Shopping Independent   Prior Level Of Mobility Independent Without Device in Community;Independent Without Device in Home   Driving / Transportation Driving Independent   Occupation (Pre-Hospital Vocational) Employed Full Time  (works at [employer])   History of Falls   History of Falls Yes   Precautions   Precautions Fall Risk;Weight Bearing As Tolerated Right Lower Extremity;Toe Touch Weight Bearing Left Lower Extremity   Comments x6 weeks; pelvic ring fx   Cognition    Cognition / Consciousness WDL   Level of Consciousness Alert   Comments pleasant and cooperative, reports feeling nervous about OOB but is motivated   Active ROM Upper Body   Active ROM Upper Body  WDL   Strength Upper Body   Upper Body Strength  WDL   Balance Assessment   Sitting Balance (Static) Fair   Sitting Balance (Dynamic) Fair -   Standing Balance (Static) Fair -   Standing Balance (Dynamic) Poor +   Weight Shift Sitting Fair   Weight Shift Standing Poor   Comments w/ FWW; able to maintain TTWB LLE   Bed Mobility    Supine to Sit Moderate Assist   Scooting Standby Assist   ADL Assessment   Grooming Seated;Supervision   Lower Body Dressing Maximal Assist   Toileting Maximal Assist  (pt attempted to wipe in different positions, difficulty completing)   Comments began education on different strategies for ADLs, ADL txfs, hand placement during txfs   How much help from another person does the patient currently need...   Putting on and taking off regular lower body clothing? 2   Bathing (including washing, rinsing, and drying)? 2   Toileting, which includes using a toilet, bedpan, or urinal? 2   Putting on and taking off regular upper body clothing? 3   Taking care of personal grooming such as brushing teeth? 3   Eating meals? 3   6 Clicks Daily Activity Score 15   Functional Mobility   Sit to Stand Minimal Assist   Bed, Chair, Wheelchair Transfer Minimal Assist   Toilet Transfers Minimal Assist  (BSC)   Mobility  EOB>BSC>Chair   Comments w/ FWW   Short Term Goals   Short Term Goal # 1 pt will demo ADL txfs w/ supv   Short Term Goal # 2 pt will dress LB with supv and AE prn   Short Term Goal # 3 pt will demo toileting w/ supv

## 2023-12-26 NOTE — ED NOTES
To floor with transport; AOX4 GCS15; on oxygen at 2L via nasal cannula. All belongings with patient

## 2023-12-26 NOTE — PROGRESS NOTES
Pelvic ring injury  Anatomic alignment  WBAT RLE, TTWB LLE x 6 weeks  No surgery required unless unable to mobilize with PT secondary to pain  Admit for pain control  PT/OT

## 2023-12-26 NOTE — THERAPY
Physical Therapy   Initial Evaluation     Patient Name: Vanessa Crenshaw  Age:  38 y.o., Sex:  female  Medical Record #: [id number]  Today's Date: 12/26/2023     Precautions  Precautions: Fall Risk;Weight Bearing As Tolerated Right Lower Extremity;Toe Touch Weight Bearing Left Lower Extremity  Comments: pelvic fxs    Assessment  Patient is 38 y.o. female presenting after fall on the beach in [place] resulting in multiple pelvic fractures, managed non-op. Pt is independent with functional mobility at baseline using no AD. Lives with her SO on the 2nd floor of a duplex with 20 PALMER. Reports that she does not feel like she can d/c home since her SO works during the day and they have an unruly 60 lb dog. Stairs would also be a major barrier given current TTWB LLE. During current session, pt presents with pelvic pain, impaired balance, and fear of causing further musculoskeletal damage requiring overall min-mod A for mobility as detailed below. Benefited from reassurance that WB will not cause pelvic damage at this time. Able to transfer from bed>BSC and BSC>recliner with FWW and min A. Recommend post-acute placement. Pt would benefit from continued acute PT services to improve functional mobility prior to d/c.    Plan    Physical Therapy Initial Treatment Plan   Treatment Plan : Bed Mobility, Equipment, Gait Training, Manual Therapy, Neuro Re-Education / Balance, Self Care / Home Evaluation, Stair Training, Therapeutic Activities, Therapeutic Exercise  Treatment Frequency: 5 Times per Week  Duration: Until Therapy Goals Met    DC Equipment Recommendations: Unable to determine at this time  Discharge Recommendations: Recommend post-acute placement for additional physical therapy services prior to discharge home       Subjective    Pt received resting in bed, agreeable to participate.      Objective       12/26/23 0944   Initial Contact Note    Initial Contact Note Order Received and Verified, Physical Therapy  "Evaluation in Progress with Full Report to Follow.   Precautions   Precautions Fall Risk;Weight Bearing As Tolerated Right Lower Extremity;Toe Touch Weight Bearing Left Lower Extremity   Comments pelvic fxs   Vitals   O2 Delivery Device None - Room Air   Pain 0 - 10 Group   Therapist Pain Assessment During Activity;Post Activity Pain Same as Prior to Activity;Nurse Notified  (c/o pelvic pain with mobility)   Prior Living Situation   Prior Services Home-Independent   Housing / Facility 2 Story Apartment / Condo  (lives on 2nd story of duplex)   Steps Into Home 20   Steps In Home   (FOS)   Equipment Owned None   Lives with - Patient's Self Care Capacity Significant Other   Comments Lives in [place]. Works at [employer] Recently finished OPPT for chronic R knee pain. Reports that she does not feel like she can d/c home since her SO works during the day and they have an unruly 60 lb dog. Pt reported that she would speak with family members later today about an alternate d/c location   Prior Level of Functional Mobility   Bed Mobility Independent   Transfer Status Independent   Ambulation Independent   Ambulation Distance community   Assistive Devices Used None   Stairs Independent   History of Falls   History of Falls Yes   Date of Last Fall   (reason for admit)   Cognition    Cognition / Consciousness WDL   Level of Consciousness Alert   Comments pleasant and cooperative, slight inconsistencies when giving home setup details   Passive ROM Lower Body   Passive ROM Lower Body X   Comments L>R LE grossly pain limited   Active ROM Lower Body    Active ROM Lower Body  X   Comments L>R LE grossly pain limited, required assist to move BLE off the bed   Strength Lower Body   Lower Body Strength  X   Comments L>R LE grossly pain limited   Sensation Lower Body   Lower Extremity Sensation   X   Comments pt reported numbness at L outer hip   Coordination Lower Body    Coordination Lower Body  X   Comments L>R " LE grossly pain limited   Balance Assessment   Sitting Balance (Static) Fair   Sitting Balance (Dynamic) Fair   Standing Balance (Static) Fair -   Standing Balance (Dynamic) Poor +   Weight Shift Sitting Fair   Weight Shift Standing Absent  (TTWB LLE)   Comments FWW   Bed Mobility    Supine to Sit Moderate Assist   Scooting Standby Assist   Comments up in chair post   Gait Analysis   Gait Level Of Assist Unable to Participate   Comments limited by pain and apprehension re: WB status   Functional Mobility   Sit to Stand Minimal Assist   Bed, Chair, Wheelchair Transfer Minimal Assist   Toilet Transfers Minimal Assist   Transfer Method Stand Pivot   Mobility bed>BSC>recliner with FWW   How much difficulty does the patient currently have...   Turning over in bed (including adjusting bedclothes, sheets and blankets)? 2   Sitting down on and standing up from a chair with arms (e.g., wheelchair, bedside commode, etc.) 1   Moving from lying on back to sitting on the side of the bed? 1   How much help from another person does the patient currently need...   Moving to and from a bed to a chair (including a wheelchair)? 3   Need to walk in a hospital room? 3   Climbing 3-5 steps with a railing? 1   6 clicks Mobility Score 11   Patient / Family Goals    Patient / Family Goal #1 to go to rehab   Short Term Goals    Short Term Goal # 1 Pt will perform supine<>sit from flat bed with SPV to progress bed mobility in 6 visits.   Short Term Goal # 2 Pt will perform stand hop transfer with FWW and SPV to progress OOB mobility in 6 visits.   Short Term Goal # 3 Pt will ambulate 25 ft with FWW and SBA to walk to/from bathroom in 6 visits.   Short Term Goal # 4 Pt will navigate full FOS with SBA to access home in 6 visits.   Education Group   Education Provided Role of Physical Therapist;Weight Bearing Precautions   Role of Physical Therapist Patient Response Patient;Acceptance;Explanation;Demonstration;Verbal Demonstration;Action  Demonstration   Weight Bearing Precautions Patient Response Patient;Acceptance;Explanation;Demonstration;Verbal Demonstration;Action Demonstration   Physical Therapy Initial Treatment Plan    Treatment Plan  Bed Mobility;Equipment;Gait Training;Manual Therapy;Neuro Re-Education / Balance;Self Care / Home Evaluation;Stair Training;Therapeutic Activities;Therapeutic Exercise   Treatment Frequency 5 Times per Week   Duration Until Therapy Goals Met   Problem List    Problems Pain;Impaired Bed Mobility;Impaired Transfers;Impaired Ambulation;Functional ROM Deficit;Functional Strength Deficit;Impaired Balance;Decreased Activity Tolerance   Anticipated Discharge Equipment and Recommendations   DC Equipment Recommendations Unable to determine at this time   Discharge Recommendations Recommend post-acute placement for additional physical therapy services prior to discharge home   Interdisciplinary Plan of Care Collaboration   IDT Collaboration with  Nursing;Occupational Therapist   Patient Position at End of Therapy Seated;Call Light within Reach;Tray Table within Reach;Phone within Reach   Collaboration Comments RN and OT updated   Session Information   Date / Session Number  12/26- 1(1/5, 1/1)   Priority   (pelvic fxs)

## 2023-12-26 NOTE — CARE PLAN
The patient is Watcher - Medium risk of patient condition declining or worsening    Shift Goals  Clinical Goals: pain management, PT/OT, void  Patient Goals: pain management, urinate  Family Goals: not present    Progress made toward(s) clinical / shift goals:  yes    Problem: Pain - Standard  Goal: Alleviation of pain or a reduction in pain to the patient’s comfort goal  Outcome: Progressing     Problem: Knowledge Deficit - Standard  Goal: Patient and family/care givers will demonstrate understanding of plan of care, disease process/condition, diagnostic tests and medications  Outcome: Progressing     Problem: Skin Integrity  Goal: Skin integrity is maintained or improved  Outcome: Progressing     Problem: Urinary Elimination  Goal: Establish and maintain regular urinary output  Outcome: Progressing     Problem: Mobility  Goal: Patient's capacity to carry out activities will improve  Outcome: Progressing  Flowsheets (Taken 12/26/2023 1030)  Mobility:   Encouraged mobilization per interdisciplinary team recommendations   Monitored for signs of activity intolerance   Provided assistive devices   Provided rest periods between activities   Administered pain management to allow progressive mobilization   Collaborated with PT/OT     Problem: Infection - Standard  Goal: Patient will remain free from infection  Outcome: Progressing  Flowsheets (Taken 12/26/2023 1030)  Standard Infection Interventions:   Assessed for signs and symptoms of infection   Implemented standard precautions   Instructed patient/family on signs and symptoms of infection   Provided education on proper hand hygiene and infection prevention measures   Assessed for removal IV, central lines, intra-arterial or urinary catheters

## 2023-12-26 NOTE — ED NOTES
Med rec completed per patient at bedside and electronic medical records from [facility] (patient uses the clinic's pharmacy; clinic closed today due to holiday).  Allergies reviewed with patient. NKDA.  Patient's preferred pharmacy for discharge medications: [facility] on [address].    ANTICOAGULATION: NONE.    Outpatient antibiotics within the last 30 days: NONE.    Paresh Eller PhT

## 2023-12-26 NOTE — ED NOTES
Bedside report received from previous shift.   Assumed patient care. Verified patient identification.  Checked on bed, connected to monitor,  with unlabored respirations. Discussed plan of care.   Vital signs is stable. Denied any new complaints.   Gurney in low position, side rail up for pt safety. Call light within reach.   No needs identified at the moment. Instructed to use call light when needed.    Contraptions: IV on right arm  Alert and Oriented: X4  Ambulatory: no  Oxygen: yes 2L via nasal cannula  Pending: room assignment

## 2023-12-26 NOTE — PROGRESS NOTES
4 Eyes Skin Assessment Completed by JOLEEN Chun and JOLEEN Wilson.    Head WDL  Ears WDL  Nose WDL  Mouth WDL  Neck WDL  Breast/Chest WDL  Shoulder Blades WDL  Spine WDL  (R) Arm/Elbow/Hand WDL  (L) Arm/Elbow/Hand Bruising  Abdomen WDL  Groin WDL  Scrotum/Coccyx/Buttocks WDL  (R) Leg WDL  (L) Leg Bruising  (R) Heel/Foot/Toe WDL  (L) Heel/Foot/Toe Bruising          Devices In Places Blood Pressure Cuff, Pulse Ox, and Nasal Cannula      Interventions In Place NC W/Ear Foams and Waffle Overlay    Possible Skin Injury No    Pictures Uploaded Into Epic N/A  Wound Consult Placed N/A  RN Wound Prevention Protocol Ordered No

## 2023-12-26 NOTE — PROGRESS NOTES
Orthopaedic Progress Note    Interval changes:  Patient doing well   Mod issues with mobilizing- If still having issues Thursday may need surgical intervention  Placement orders placed     ROS - Patient denies any new issues.  Pain well controlled.    BP (!) 143/94   Pulse 88   Temp 36.5 °C (97.7 °F) (Temporal)   Resp 17   Wt 101 kg (222 lb 10.6 oz)   SpO2 94%     Patient seen and examined  No acute distress  Breathing non labored  RRR  BLE DNVI, report slight decrease in sensation to left foot lateral border, cap refill <2 sec.     Recent Labs     12/25/23  1741   WBC 11.1*   RBC 4.66   HEMOGLOBIN 11.3*   HEMATOCRIT 36.9*   MCV 79.2*   MCH 24.2*   MCHC 30.6*   RDW 49.9   PLATELETCT 190   MPV 8.9*       Active Hospital Problems    Diagnosis     Pelvic fracture ([facility]) [S32.9XXA]      Priority: High    Trauma [T14.90XA]      Priority: Low    Multiple closed pelvic fractures with disruption of pelvic [place], initial encounter ([facility]) [S32.810A]        Assessment/Plan:  Patient doing well   Mod issues with mobilizing- If still having issues Thursday may need surgical intervention  Placement orders placed

## 2023-12-27 ENCOUNTER — APPOINTMENT (OUTPATIENT)
Dept: RADIOLOGY | Facility: MEDICAL CENTER | Age: 38
DRG: 516 | End: 2023-12-27
Attending: INTERNAL MEDICINE
Payer: MEDICAID

## 2023-12-27 PROCEDURE — 97530 THERAPEUTIC ACTIVITIES: CPT

## 2023-12-27 PROCEDURE — A9270 NON-COVERED ITEM OR SERVICE: HCPCS | Mod: UD | Performed by: ORTHOPAEDIC SURGERY

## 2023-12-27 PROCEDURE — 700111 HCHG RX REV CODE 636 W/ 250 OVERRIDE (IP): Mod: UD | Performed by: ORTHOPAEDIC SURGERY

## 2023-12-27 PROCEDURE — 700102 HCHG RX REV CODE 250 W/ 637 OVERRIDE(OP): Mod: UD | Performed by: ORTHOPAEDIC SURGERY

## 2023-12-27 PROCEDURE — 97535 SELF CARE MNGMENT TRAINING: CPT

## 2023-12-27 PROCEDURE — 96376 TX/PRO/DX INJ SAME DRUG ADON: CPT

## 2023-12-27 PROCEDURE — 770001 HCHG ROOM/CARE - MED/SURG/GYN PRIV*

## 2023-12-27 PROCEDURE — A9270 NON-COVERED ITEM OR SERVICE: HCPCS | Mod: UD | Performed by: EMERGENCY MEDICAL TECHNICIAN, INTERMEDIATE

## 2023-12-27 PROCEDURE — 700102 HCHG RX REV CODE 250 W/ 637 OVERRIDE(OP): Mod: UD | Performed by: EMERGENCY MEDICAL TECHNICIAN, INTERMEDIATE

## 2023-12-27 RX ORDER — POLYETHYLENE GLYCOL 3350 17 G/17G
1 POWDER, FOR SOLUTION ORAL
Status: DISCONTINUED | OUTPATIENT
Start: 2023-12-27 | End: 2024-01-02 | Stop reason: HOSPADM

## 2023-12-27 RX ORDER — METAXALONE 800 MG/1
800 TABLET ORAL 3 TIMES DAILY
Status: DISCONTINUED | OUTPATIENT
Start: 2023-12-27 | End: 2024-01-02 | Stop reason: HOSPADM

## 2023-12-27 RX ORDER — AMOXICILLIN 250 MG
2 CAPSULE ORAL 2 TIMES DAILY
Status: DISCONTINUED | OUTPATIENT
Start: 2023-12-27 | End: 2024-01-02 | Stop reason: HOSPADM

## 2023-12-27 RX ORDER — BISACODYL 10 MG
10 SUPPOSITORY, RECTAL RECTAL
Status: DISCONTINUED | OUTPATIENT
Start: 2023-12-27 | End: 2024-01-02 | Stop reason: HOSPADM

## 2023-12-27 RX ADMIN — ACETAMINOPHEN 1000 MG: 500 TABLET ORAL at 16:10

## 2023-12-27 RX ADMIN — OXYCODONE HYDROCHLORIDE 10 MG: 10 TABLET ORAL at 14:36

## 2023-12-27 RX ADMIN — OXYCODONE HYDROCHLORIDE 10 MG: 10 TABLET ORAL at 11:06

## 2023-12-27 RX ADMIN — DOCUSATE SODIUM 50 MG AND SENNOSIDES 8.6 MG 2 TABLET: 8.6; 5 TABLET, FILM COATED ORAL at 11:05

## 2023-12-27 RX ADMIN — OXYCODONE HYDROCHLORIDE 10 MG: 10 TABLET ORAL at 04:29

## 2023-12-27 RX ADMIN — METAXALONE 800 MG: 800 TABLET ORAL at 16:10

## 2023-12-27 RX ADMIN — ACETAMINOPHEN 1000 MG: 500 TABLET ORAL at 00:14

## 2023-12-27 RX ADMIN — DOCUSATE SODIUM 50 MG AND SENNOSIDES 8.6 MG 2 TABLET: 8.6; 5 TABLET, FILM COATED ORAL at 16:10

## 2023-12-27 RX ADMIN — KETOROLAC TROMETHAMINE 15 MG: 30 INJECTION, SOLUTION INTRAMUSCULAR; INTRAVENOUS at 11:05

## 2023-12-27 RX ADMIN — OXYCODONE HYDROCHLORIDE 10 MG: 10 TABLET ORAL at 00:13

## 2023-12-27 RX ADMIN — ACETAMINOPHEN 1000 MG: 500 TABLET ORAL at 11:05

## 2023-12-27 RX ADMIN — OXYCODONE HYDROCHLORIDE 10 MG: 10 TABLET ORAL at 07:38

## 2023-12-27 RX ADMIN — POLYETHYLENE GLYCOL 3350 1 PACKET: 17 POWDER, FOR SOLUTION ORAL at 13:49

## 2023-12-27 RX ADMIN — KETOROLAC TROMETHAMINE 15 MG: 30 INJECTION, SOLUTION INTRAMUSCULAR; INTRAVENOUS at 16:10

## 2023-12-27 RX ADMIN — METAXALONE 800 MG: 800 TABLET ORAL at 11:05

## 2023-12-27 RX ADMIN — OXYCODONE HYDROCHLORIDE 10 MG: 10 TABLET ORAL at 19:43

## 2023-12-27 RX ADMIN — MAGNESIUM HYDROXIDE 30 ML: 1200 LIQUID ORAL at 20:39

## 2023-12-27 RX ADMIN — ENOXAPARIN SODIUM 40 MG: 100 INJECTION SUBCUTANEOUS at 16:10

## 2023-12-27 RX ADMIN — ACETAMINOPHEN 1000 MG: 500 TABLET ORAL at 04:29

## 2023-12-27 ASSESSMENT — PATIENT HEALTH QUESTIONNAIRE - PHQ9
2. FEELING DOWN, DEPRESSED, IRRITABLE, OR HOPELESS: NOT AT ALL
SUM OF ALL RESPONSES TO PHQ9 QUESTIONS 1 AND 2: 0
1. LITTLE INTEREST OR PLEASURE IN DOING THINGS: NOT AT ALL

## 2023-12-27 ASSESSMENT — LIFESTYLE VARIABLES
HAVE PEOPLE ANNOYED YOU BY CRITICIZING YOUR DRINKING: NO
HOW MANY TIMES IN THE PAST YEAR HAVE YOU HAD 5 OR MORE DRINKS IN A DAY: 0
CONSUMPTION TOTAL: NEGATIVE
ON A TYPICAL DAY WHEN YOU DRINK ALCOHOL HOW MANY DRINKS DO YOU HAVE: 3
DOES PATIENT WANT TO STOP DRINKING: NO
HAVE YOU EVER FELT YOU SHOULD CUT DOWN ON YOUR DRINKING: NO
TOTAL SCORE: 0
EVER HAD A DRINK FIRST THING IN THE MORNING TO STEADY YOUR NERVES TO GET RID OF A HANGOVER: NO
TOTAL SCORE: 0
AVERAGE NUMBER OF DAYS PER WEEK YOU HAVE A DRINK CONTAINING ALCOHOL: 1
ALCOHOL_USE: YES
EVER FELT BAD OR GUILTY ABOUT YOUR DRINKING: NO
TOTAL SCORE: 0

## 2023-12-27 ASSESSMENT — COGNITIVE AND FUNCTIONAL STATUS - GENERAL
MOVING TO AND FROM BED TO CHAIR: UNABLE
HELP NEEDED FOR BATHING: A LOT
EATING MEALS: A LITTLE
MOVING FROM LYING ON BACK TO SITTING ON SIDE OF FLAT BED: UNABLE
TURNING FROM BACK TO SIDE WHILE IN FLAT BAD: UNABLE
PERSONAL GROOMING: A LITTLE
DRESSING REGULAR UPPER BODY CLOTHING: A LITTLE
MOBILITY SCORE: 8
CLIMB 3 TO 5 STEPS WITH RAILING: TOTAL
TOILETING: A LOT
WALKING IN HOSPITAL ROOM: TOTAL
SUGGESTED CMS G CODE MODIFIER MOBILITY: CM
DRESSING REGULAR LOWER BODY CLOTHING: A LOT
STANDING UP FROM CHAIR USING ARMS: A LITTLE
SUGGESTED CMS G CODE MODIFIER DAILY ACTIVITY: CK
DAILY ACTIVITIY SCORE: 15

## 2023-12-27 ASSESSMENT — PAIN DESCRIPTION - PAIN TYPE
TYPE: ACUTE PAIN

## 2023-12-27 ASSESSMENT — GAIT ASSESSMENTS: GAIT LEVEL OF ASSIST: UNABLE TO PARTICIPATE

## 2023-12-27 NOTE — THERAPY
Physical Therapy   Daily Treatment     Patient Name: Vanessa Crenshaw  Age:  38 y.o., Sex:  female  Medical Record #: [id number]  Today's Date: 12/27/2023     Precautions  Precautions: Fall Risk;Weight Bearing As Tolerated Right Lower Extremity;Toe Touch Weight Bearing Left Lower Extremity  Comments: pelvic fxs    Assessment    Pt seen for PT tx session. Reported increased pelvic pain today vs yesterday on L>R side but agreeable to get OOB. Required increased assist to mobilize including max A for supine>sit and mod A for sit<>stand to FWW. Unable to ambulate 2/2 inability to effectively maintain NWB LLE. Pt reported that she is considering sx for SI screw placement tomorrow for better pain control. Will follow.    Plan    Treatment Plan Status: Continue Current Treatment Plan  Type of Treatment: Bed Mobility, Equipment, Gait Training, Manual Therapy, Neuro Re-Education / Balance, Self Care / Home Evaluation, Stair Training, Therapeutic Activities, Therapeutic Exercise  Treatment Frequency: 5 Times per Week  Treatment Duration: Until Therapy Goals Met    DC Equipment Recommendations: Unable to determine at this time  Discharge Recommendations: Recommend post-acute placement for additional physical therapy services prior to discharge home      Subjective    Pt received resting in bed, agreeable to participate.      Objective       12/27/23 1131   Precautions   Precautions Fall Risk;Weight Bearing As Tolerated Right Lower Extremity;Toe Touch Weight Bearing Left Lower Extremity   Comments pelvic fxs   Vitals   O2 Delivery Device None - Room Air   Pain 0 - 10 Group   Therapist Pain Assessment During Activity;Post Activity Pain Same as Prior to Activity;Nurse Notified  (c/o L>R pelvic pain with mobility)   Cognition    Cognition / Consciousness WDL   Level of Consciousness Alert   Comments pleasant and cooperative, unclear home d/c options   Balance   Sitting Balance (Static) Fair -   Sitting Balance (Dynamic) Fair  -   Standing Balance (Static) Fair -   Standing Balance (Dynamic) Poor +   Weight Shift Sitting Fair   Weight Shift Standing Absent  (TTWB LLE)   Skilled Intervention Facilitation;Sequencing;Tactile Cuing;Verbal Cuing   Comments FWW   Bed Mobility    Supine to Sit Maximal Assist   Scooting Moderate Assist  (EOB)   Rolling Unable to Participate  (pain limited)   Skilled Intervention Compensatory Strategies;Facilitation;Postural Facilitation;Sequencing;Tactile Cuing;Verbal Cuing   Comments up in chair post   Gait Analysis   Gait Level Of Assist Unable to Participate   Skilled Intervention Compensatory Strategies;Sequencing;Tactile Cuing;Verbal Cuing   Comments attempted but pt unable to maintain NWB LLE for ambulation 2/2 pelvic pain, ceased activity   Functional Mobility   Sit to Stand Moderate Assist   Bed, Chair, Wheelchair Transfer Minimal Assist   Toilet Transfers Minimal Assist   Transfer Method Stand Pivot   Mobility bed>BSC>recliner with FWW   Skilled Intervention Compensatory Strategies;Facilitation;Postural Facilitation;Sequencing;Tactile Cuing;Verbal Cuing   How much difficulty does the patient currently have...   Turning over in bed (including adjusting bedclothes, sheets and blankets)? 1   Sitting down on and standing up from a chair with arms (e.g., wheelchair, bedside commode, etc.) 1   Moving from lying on back to sitting on the side of the bed? 1   How much help from another person does the patient currently need...   Moving to and from a bed to a chair (including a wheelchair)? 3   Need to walk in a hospital room? 1   Climbing 3-5 steps with a railing? 1   6 clicks Mobility Score 8   Patient / Family Goals    Patient / Family Goal #1 to go to rehab   Goal #1 Outcome Goal not met   Short Term Goals    Short Term Goal # 1 Pt will perform supine<>sit from flat bed with SPV to progress bed mobility in 6 visits.   Goal Outcome # 1 Progressing slower than expected   Short Term Goal # 2 Pt will perform  "stand hop transfer with FWW and SPV to progress OOB mobility in 6 visits.   Goal Outcome # 2 Progressing slower than expected   Short Term Goal # 3 Pt will ambulate 25 ft with FWW and SBA to walk to/from bathroom in 6 visits.   Goal Outcome # 3 Goal not met   Short Term Goal # 4 Pt will navigate full FOS with SBA to access home in 6 visits.   Goal Outcome # 4 Goal not met   Education Group   Additional Comments pt reported that she felt she \"overdid it\" yesterday by getting into recliner x2 times, recommended that pt get into recliner only x1 time today and gauge pain response   Physical Therapy Treatment Plan   Physical Therapy Treatment Plan Continue Current Treatment Plan   Anticipated Discharge Equipment and Recommendations   DC Equipment Recommendations Unable to determine at this time   Discharge Recommendations Recommend post-acute placement for additional physical therapy services prior to discharge home   Interdisciplinary Plan of Care Collaboration   IDT Collaboration with  Nursing;Occupational Therapist;Family / Caregiver   Patient Position at End of Therapy Seated;Call Light within Reach;Tray Table within Reach;Phone within Reach;Family / Friend in Room   Collaboration Comments RN and OT updated, SO at bedside   Session Information   Date / Session Number  12/27- 2(2/5, 1/1)         "

## 2023-12-27 NOTE — PROGRESS NOTES
Mobilize with PT  WBAT RLE, TTWB LLE  If pain too severe for mobilization today will proceed with SI screw tomorrow  NPO after MN

## 2023-12-27 NOTE — CARE PLAN
The patient is Stable - Low risk of patient condition declining or worsening    Shift Goals  Clinical Goals: pain management, mobility  Patient Goals: shower  Family Goals: not present    Progress made toward(s) clinical / shift goals:  yes    Problem: Pain - Standard  Goal: Alleviation of pain or a reduction in pain to the patient’s comfort goal  Outcome: Progressing     Problem: Knowledge Deficit - Standard  Goal: Patient and family/care givers will demonstrate understanding of plan of care, disease process/condition, diagnostic tests and medications  Outcome: Progressing     Problem: Skin Integrity  Goal: Skin integrity is maintained or improved  Outcome: Progressing     Problem: Urinary Elimination  Goal: Establish and maintain regular urinary output  Outcome: Progressing     Problem: Mobility  Goal: Patient's capacity to carry out activities will improve  Outcome: Progressing  Flowsheets (Taken 12/26/2023 1030)  Mobility:   Encouraged mobilization per interdisciplinary team recommendations   Monitored for signs of activity intolerance   Provided assistive devices   Provided rest periods between activities   Administered pain management to allow progressive mobilization   Collaborated with PT/OT     Problem: Infection - Standard  Goal: Patient will remain free from infection  Outcome: Progressing  Flowsheets (Taken 12/26/2023 1030)  Standard Infection Interventions:   Assessed for signs and symptoms of infection   Implemented standard precautions   Instructed patient/family on signs and symptoms of infection   Provided education on proper hand hygiene and infection prevention measures   Assessed for removal IV, central lines, intra-arterial or urinary catheters

## 2023-12-27 NOTE — THERAPY
Occupational Therapy  Daily Treatment     Patient Name: Vanessa Crenshaw  Age:  38 y.o., Sex:  female  Medical Record #: [id number]  Today's Date: 12/27/2023     Precautions: Fall Risk, Weight Bearing As Tolerated Right Lower Extremity, Toe Touch Weight Bearing Left Lower Extremity  Comments: pelvic fxs    Assessment  Pt seen for OT tx session. She reported increased pain today compared to yesterday and required increased assist with Adls/mobility. She is primarily limited by poor pain control, poor activity tolerance, and weakness. Will follow for skilled OT services. Continue to recommend post acute placement at this time.  Plan    Treatment Plan Status: (P) Continue Current Treatment Plan  Type of Treatment: Self Care / Activities of Daily Living, Adaptive Equipment, Neuro Re-Education / Balance, Therapeutic Exercises, Therapeutic Activity  Treatment Frequency: 5 Times per Week  Treatment Duration: Until Therapy Goals Met    DC Equipment Recommendations: (P) Unable to determine at this time  Discharge Recommendations: (P) Recommend post-acute placement for additional occupational therapy services prior to discharge home       12/27/23 1135   Cognition    Comments Pt pleasant and agreeable, unclear home DC options   Balance   Sitting Balance (Static) Fair -   Sitting Balance (Dynamic) Poor +   Standing Balance (Static) Fair -   Standing Balance (Dynamic) Poor +   Weight Shift Sitting Fair   Weight Shift Standing Absent   Skilled Intervention Tactile Cuing;Verbal Cuing   Bed Mobility    Supine to Sit Maximal Assist   Sit to Supine Maximal Assist   Skilled Intervention Verbal Cuing   Activities of Daily Living   Lower Body Dressing Maximal Assist   Toileting Maximal Assist  (with wiping after urination on BSC)   Skilled Intervention Verbal Cuing   How much help from another person does the patient currently need...   6 Clicks Daily Activity Score 15   Functional Mobility   Sit to Stand Moderate Assist   Bed,  Chair, Wheelchair Transfer Minimal Assist   Short Term Goals   Short Term Goal # 1 pt will demo ADL txfs w/ supv   Goal Outcome # 1 Progressing as expected   Short Term Goal # 2 pt will dress LB with supv and AE prn   Goal Outcome # 2 Progressing as expected   Short Term Goal # 3 pt will demo toileting w/ supv   Goal Outcome # 3 Progressing slower than expected   Education Group   Role of Occupational Therapist Patient Response Patient;Acceptance;Explanation   Occupational Therapy Treatment Plan    O.T. Treatment Plan Continue Current Treatment Plan   Anticipated Discharge Equipment and Recommendations   DC Equipment Recommendations Unable to determine at this time   Discharge Recommendations Recommend post-acute placement for additional occupational therapy services prior to discharge home
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
## 2023-12-28 ENCOUNTER — APPOINTMENT (OUTPATIENT)
Dept: RADIOLOGY | Facility: MEDICAL CENTER | Age: 38
DRG: 516 | End: 2023-12-28
Attending: ORTHOPAEDIC SURGERY
Payer: MEDICAID

## 2023-12-28 ENCOUNTER — ANESTHESIA EVENT (OUTPATIENT)
Dept: SURGERY | Facility: MEDICAL CENTER | Age: 38
DRG: 516 | End: 2023-12-28
Payer: MEDICAID

## 2023-12-28 ENCOUNTER — ANESTHESIA (OUTPATIENT)
Dept: SURGERY | Facility: MEDICAL CENTER | Age: 38
DRG: 516 | End: 2023-12-28
Payer: MEDICAID

## 2023-12-28 PROCEDURE — C1713 ANCHOR/SCREW BN/BN,TIS/BN: HCPCS | Performed by: ORTHOPAEDIC SURGERY

## 2023-12-28 PROCEDURE — 770001 HCHG ROOM/CARE - MED/SURG/GYN PRIV*

## 2023-12-28 PROCEDURE — 8968 PR NO CHARGE - PROCEDURE: Mod: 80ROC | Performed by: STUDENT IN AN ORGANIZED HEALTH CARE EDUCATION/TRAINING PROGRAM

## 2023-12-28 PROCEDURE — 0QH134Z INSERTION OF INTERNAL FIXATION DEVICE INTO SACRUM, PERCUTANEOUS APPROACH: ICD-10-PCS | Performed by: ORTHOPAEDIC SURGERY

## 2023-12-28 PROCEDURE — A9270 NON-COVERED ITEM OR SERVICE: HCPCS | Performed by: PHYSICIAN ASSISTANT

## 2023-12-28 PROCEDURE — 160035 HCHG PACU - 1ST 60 MINS PHASE I: Performed by: ORTHOPAEDIC SURGERY

## 2023-12-28 PROCEDURE — 160002 HCHG RECOVERY MINUTES (STAT): Performed by: ORTHOPAEDIC SURGERY

## 2023-12-28 PROCEDURE — 99222 1ST HOSP IP/OBS MODERATE 55: CPT | Mod: 57 | Performed by: ORTHOPAEDIC SURGERY

## 2023-12-28 PROCEDURE — A9270 NON-COVERED ITEM OR SERVICE: HCPCS | Performed by: STUDENT IN AN ORGANIZED HEALTH CARE EDUCATION/TRAINING PROGRAM

## 2023-12-28 PROCEDURE — 160036 HCHG PACU - EA ADDL 30 MINS PHASE I: Performed by: ORTHOPAEDIC SURGERY

## 2023-12-28 PROCEDURE — 700111 HCHG RX REV CODE 636 W/ 250 OVERRIDE (IP): Mod: JZ | Performed by: STUDENT IN AN ORGANIZED HEALTH CARE EDUCATION/TRAINING PROGRAM

## 2023-12-28 PROCEDURE — 700102 HCHG RX REV CODE 250 W/ 637 OVERRIDE(OP): Performed by: EMERGENCY MEDICAL TECHNICIAN, INTERMEDIATE

## 2023-12-28 PROCEDURE — 700102 HCHG RX REV CODE 250 W/ 637 OVERRIDE(OP): Performed by: PHYSICIAN ASSISTANT

## 2023-12-28 PROCEDURE — 160048 HCHG OR STATISTICAL LEVEL 1-5: Performed by: ORTHOPAEDIC SURGERY

## 2023-12-28 PROCEDURE — 72190 X-RAY EXAM OF PELVIS: CPT

## 2023-12-28 PROCEDURE — 160029 HCHG SURGERY MINUTES - 1ST 30 MINS LEVEL 4: Performed by: ORTHOPAEDIC SURGERY

## 2023-12-28 PROCEDURE — A9270 NON-COVERED ITEM OR SERVICE: HCPCS | Performed by: EMERGENCY MEDICAL TECHNICIAN, INTERMEDIATE

## 2023-12-28 PROCEDURE — 700102 HCHG RX REV CODE 250 W/ 637 OVERRIDE(OP): Performed by: STUDENT IN AN ORGANIZED HEALTH CARE EDUCATION/TRAINING PROGRAM

## 2023-12-28 PROCEDURE — 700101 HCHG RX REV CODE 250: Performed by: STUDENT IN AN ORGANIZED HEALTH CARE EDUCATION/TRAINING PROGRAM

## 2023-12-28 PROCEDURE — A9270 NON-COVERED ITEM OR SERVICE: HCPCS | Performed by: ORTHOPAEDIC SURGERY

## 2023-12-28 PROCEDURE — 700102 HCHG RX REV CODE 250 W/ 637 OVERRIDE(OP): Performed by: ORTHOPAEDIC SURGERY

## 2023-12-28 PROCEDURE — 110371 HCHG SHELL REV 272: Performed by: ORTHOPAEDIC SURGERY

## 2023-12-28 PROCEDURE — 160009 HCHG ANES TIME/MIN: Performed by: ORTHOPAEDIC SURGERY

## 2023-12-28 PROCEDURE — 160041 HCHG SURGERY MINUTES - EA ADDL 1 MIN LEVEL 4: Performed by: ORTHOPAEDIC SURGERY

## 2023-12-28 PROCEDURE — 700111 HCHG RX REV CODE 636 W/ 250 OVERRIDE (IP): Performed by: ORTHOPAEDIC SURGERY

## 2023-12-28 PROCEDURE — 700105 HCHG RX REV CODE 258: Performed by: STUDENT IN AN ORGANIZED HEALTH CARE EDUCATION/TRAINING PROGRAM

## 2023-12-28 PROCEDURE — 27216 TREAT PELVIC RING FRACTURE: CPT | Performed by: ORTHOPAEDIC SURGERY

## 2023-12-28 DEVICE — IMPLANTABLE DEVICE: Type: IMPLANTABLE DEVICE | Site: PELVIS | Status: FUNCTIONAL

## 2023-12-28 DEVICE — WASHER FOR 7.3MM CANNULATED SCREWS 13.0MM  (3TX18=54) (6EA/PK): Type: IMPLANTABLE DEVICE | Site: PELVIS | Status: FUNCTIONAL

## 2023-12-28 RX ORDER — HYDROMORPHONE HYDROCHLORIDE 1 MG/ML
0.1 INJECTION, SOLUTION INTRAMUSCULAR; INTRAVENOUS; SUBCUTANEOUS
Status: DISCONTINUED | OUTPATIENT
Start: 2023-12-28 | End: 2023-12-28 | Stop reason: HOSPADM

## 2023-12-28 RX ORDER — DEXMEDETOMIDINE HYDROCHLORIDE 100 UG/ML
INJECTION, SOLUTION INTRAVENOUS PRN
Status: DISCONTINUED | OUTPATIENT
Start: 2023-12-28 | End: 2023-12-28 | Stop reason: SURG

## 2023-12-28 RX ORDER — DEXAMETHASONE SODIUM PHOSPHATE 4 MG/ML
INJECTION, SOLUTION INTRA-ARTICULAR; INTRALESIONAL; INTRAMUSCULAR; INTRAVENOUS; SOFT TISSUE PRN
Status: DISCONTINUED | OUTPATIENT
Start: 2023-12-28 | End: 2023-12-28 | Stop reason: SURG

## 2023-12-28 RX ORDER — OXYCODONE HCL 5 MG/5 ML
5 SOLUTION, ORAL ORAL
Status: COMPLETED | OUTPATIENT
Start: 2023-12-28 | End: 2023-12-28

## 2023-12-28 RX ORDER — CYCLOBENZAPRINE HCL 10 MG
5 TABLET ORAL 3 TIMES DAILY PRN
Status: DISCONTINUED | OUTPATIENT
Start: 2023-12-28 | End: 2023-12-29

## 2023-12-28 RX ORDER — SODIUM CHLORIDE, SODIUM LACTATE, POTASSIUM CHLORIDE, CALCIUM CHLORIDE 600; 310; 30; 20 MG/100ML; MG/100ML; MG/100ML; MG/100ML
INJECTION, SOLUTION INTRAVENOUS CONTINUOUS
Status: DISCONTINUED | OUTPATIENT
Start: 2023-12-28 | End: 2023-12-28 | Stop reason: HOSPADM

## 2023-12-28 RX ORDER — LABETALOL HYDROCHLORIDE 5 MG/ML
5 INJECTION, SOLUTION INTRAVENOUS
Status: DISCONTINUED | OUTPATIENT
Start: 2023-12-28 | End: 2023-12-28 | Stop reason: HOSPADM

## 2023-12-28 RX ORDER — HYDROMORPHONE HYDROCHLORIDE 1 MG/ML
0.4 INJECTION, SOLUTION INTRAMUSCULAR; INTRAVENOUS; SUBCUTANEOUS
Status: DISCONTINUED | OUTPATIENT
Start: 2023-12-28 | End: 2023-12-28 | Stop reason: HOSPADM

## 2023-12-28 RX ORDER — EPHEDRINE SULFATE 50 MG/ML
5 INJECTION, SOLUTION INTRAVENOUS
Status: DISCONTINUED | OUTPATIENT
Start: 2023-12-28 | End: 2023-12-28 | Stop reason: HOSPADM

## 2023-12-28 RX ORDER — LIDOCAINE HYDROCHLORIDE 20 MG/ML
INJECTION, SOLUTION EPIDURAL; INFILTRATION; INTRACAUDAL; PERINEURAL PRN
Status: DISCONTINUED | OUTPATIENT
Start: 2023-12-28 | End: 2023-12-28 | Stop reason: SURG

## 2023-12-28 RX ORDER — MEPERIDINE HYDROCHLORIDE 25 MG/ML
12.5 INJECTION INTRAMUSCULAR; INTRAVENOUS; SUBCUTANEOUS
Status: DISCONTINUED | OUTPATIENT
Start: 2023-12-28 | End: 2023-12-28 | Stop reason: HOSPADM

## 2023-12-28 RX ORDER — CEFAZOLIN SODIUM 1 G/3ML
INJECTION, POWDER, FOR SOLUTION INTRAMUSCULAR; INTRAVENOUS PRN
Status: DISCONTINUED | OUTPATIENT
Start: 2023-12-28 | End: 2023-12-28 | Stop reason: SURG

## 2023-12-28 RX ORDER — SODIUM CHLORIDE, SODIUM LACTATE, POTASSIUM CHLORIDE, CALCIUM CHLORIDE 600; 310; 30; 20 MG/100ML; MG/100ML; MG/100ML; MG/100ML
INJECTION, SOLUTION INTRAVENOUS
Status: DISCONTINUED | OUTPATIENT
Start: 2023-12-28 | End: 2023-12-28 | Stop reason: SURG

## 2023-12-28 RX ORDER — ONDANSETRON 2 MG/ML
INJECTION INTRAMUSCULAR; INTRAVENOUS PRN
Status: DISCONTINUED | OUTPATIENT
Start: 2023-12-28 | End: 2023-12-28 | Stop reason: SURG

## 2023-12-28 RX ORDER — OXYCODONE HCL 5 MG/5 ML
10 SOLUTION, ORAL ORAL
Status: COMPLETED | OUTPATIENT
Start: 2023-12-28 | End: 2023-12-28

## 2023-12-28 RX ORDER — GLYCOPYRROLATE 0.2 MG/ML
INJECTION INTRAMUSCULAR; INTRAVENOUS PRN
Status: DISCONTINUED | OUTPATIENT
Start: 2023-12-28 | End: 2023-12-28 | Stop reason: SURG

## 2023-12-28 RX ORDER — HYDROMORPHONE HYDROCHLORIDE 1 MG/ML
0.2 INJECTION, SOLUTION INTRAMUSCULAR; INTRAVENOUS; SUBCUTANEOUS
Status: DISCONTINUED | OUTPATIENT
Start: 2023-12-28 | End: 2023-12-28 | Stop reason: HOSPADM

## 2023-12-28 RX ORDER — ONDANSETRON 2 MG/ML
4 INJECTION INTRAMUSCULAR; INTRAVENOUS
Status: DISCONTINUED | OUTPATIENT
Start: 2023-12-28 | End: 2023-12-28 | Stop reason: HOSPADM

## 2023-12-28 RX ORDER — HYDRALAZINE HYDROCHLORIDE 20 MG/ML
5 INJECTION INTRAMUSCULAR; INTRAVENOUS
Status: DISCONTINUED | OUTPATIENT
Start: 2023-12-28 | End: 2023-12-28 | Stop reason: HOSPADM

## 2023-12-28 RX ORDER — HALOPERIDOL 5 MG/ML
1 INJECTION INTRAMUSCULAR
Status: DISCONTINUED | OUTPATIENT
Start: 2023-12-28 | End: 2023-12-28 | Stop reason: HOSPADM

## 2023-12-28 RX ORDER — DIPHENHYDRAMINE HYDROCHLORIDE 50 MG/ML
12.5 INJECTION INTRAMUSCULAR; INTRAVENOUS
Status: DISCONTINUED | OUTPATIENT
Start: 2023-12-28 | End: 2023-12-28 | Stop reason: HOSPADM

## 2023-12-28 RX ADMIN — ACETAMINOPHEN 1000 MG: 500 TABLET ORAL at 06:47

## 2023-12-28 RX ADMIN — METAXALONE 800 MG: 800 TABLET ORAL at 17:12

## 2023-12-28 RX ADMIN — LIDOCAINE HYDROCHLORIDE 100 MG: 20 INJECTION, SOLUTION EPIDURAL; INFILTRATION; INTRACAUDAL at 10:21

## 2023-12-28 RX ADMIN — ENOXAPARIN SODIUM 40 MG: 100 INJECTION SUBCUTANEOUS at 17:12

## 2023-12-28 RX ADMIN — GLYCOPYRROLATE 0.1 MG: 0.2 INJECTION INTRAMUSCULAR; INTRAVENOUS at 10:41

## 2023-12-28 RX ADMIN — ACETAMINOPHEN 1000 MG: 500 TABLET ORAL at 00:04

## 2023-12-28 RX ADMIN — KETOROLAC TROMETHAMINE 15 MG: 30 INJECTION, SOLUTION INTRAMUSCULAR; INTRAVENOUS at 06:47

## 2023-12-28 RX ADMIN — CYCLOBENZAPRINE 5 MG: 10 TABLET, FILM COATED ORAL at 20:52

## 2023-12-28 RX ADMIN — ACETAMINOPHEN 1000 MG: 500 TABLET ORAL at 23:40

## 2023-12-28 RX ADMIN — OXYCODONE HYDROCHLORIDE 10 MG: 5 SOLUTION ORAL at 11:41

## 2023-12-28 RX ADMIN — OXYCODONE HYDROCHLORIDE 10 MG: 10 TABLET ORAL at 00:03

## 2023-12-28 RX ADMIN — METAXALONE 800 MG: 800 TABLET ORAL at 06:48

## 2023-12-28 RX ADMIN — DEXMEDETOMIDINE 20 MCG: 100 INJECTION, SOLUTION INTRAVENOUS at 10:41

## 2023-12-28 RX ADMIN — OXYCODONE HYDROCHLORIDE 10 MG: 10 TABLET ORAL at 08:00

## 2023-12-28 RX ADMIN — DEXAMETHASONE SODIUM PHOSPHATE 4 MG: 4 INJECTION INTRA-ARTICULAR; INTRALESIONAL; INTRAMUSCULAR; INTRAVENOUS; SOFT TISSUE at 10:24

## 2023-12-28 RX ADMIN — KETOROLAC TROMETHAMINE 15 MG: 30 INJECTION, SOLUTION INTRAMUSCULAR; INTRAVENOUS at 12:56

## 2023-12-28 RX ADMIN — ACETAMINOPHEN 1000 MG: 500 TABLET ORAL at 17:12

## 2023-12-28 RX ADMIN — PROPOFOL 200 MG: 10 INJECTION, EMULSION INTRAVENOUS at 10:21

## 2023-12-28 RX ADMIN — ACETAMINOPHEN 1000 MG: 500 TABLET ORAL at 12:57

## 2023-12-28 RX ADMIN — OXYCODONE HYDROCHLORIDE 10 MG: 10 TABLET ORAL at 23:40

## 2023-12-28 RX ADMIN — ONDANSETRON 4 MG: 2 INJECTION INTRAMUSCULAR; INTRAVENOUS at 10:46

## 2023-12-28 RX ADMIN — OXYCODONE HYDROCHLORIDE 10 MG: 10 TABLET ORAL at 15:56

## 2023-12-28 RX ADMIN — OXYCODONE HYDROCHLORIDE 10 MG: 10 TABLET ORAL at 03:32

## 2023-12-28 RX ADMIN — METAXALONE 800 MG: 800 TABLET ORAL at 12:57

## 2023-12-28 RX ADMIN — CEFAZOLIN 2 G: 1 INJECTION, POWDER, FOR SOLUTION INTRAMUSCULAR; INTRAVENOUS at 10:24

## 2023-12-28 RX ADMIN — SODIUM CHLORIDE, POTASSIUM CHLORIDE, SODIUM LACTATE AND CALCIUM CHLORIDE: 600; 310; 30; 20 INJECTION, SOLUTION INTRAVENOUS at 10:17

## 2023-12-28 RX ADMIN — OXYCODONE HYDROCHLORIDE 10 MG: 10 TABLET ORAL at 19:49

## 2023-12-28 RX ADMIN — TAMSULOSIN HYDROCHLORIDE 0.4 MG: 0.4 CAPSULE ORAL at 08:00

## 2023-12-28 RX ADMIN — FENTANYL CITRATE 100 MCG: 50 INJECTION, SOLUTION INTRAMUSCULAR; INTRAVENOUS at 10:17

## 2023-12-28 RX ADMIN — FENTANYL CITRATE 50 MCG: 50 INJECTION, SOLUTION INTRAMUSCULAR; INTRAVENOUS at 11:42

## 2023-12-28 RX ADMIN — DOCUSATE SODIUM 50 MG AND SENNOSIDES 8.6 MG 2 TABLET: 8.6; 5 TABLET, FILM COATED ORAL at 17:12

## 2023-12-28 RX ADMIN — KETOROLAC TROMETHAMINE 15 MG: 30 INJECTION, SOLUTION INTRAMUSCULAR; INTRAVENOUS at 00:04

## 2023-12-28 ASSESSMENT — PAIN DESCRIPTION - PAIN TYPE
TYPE: ACUTE PAIN
TYPE: SURGICAL PAIN
TYPE: ACUTE PAIN;SURGICAL PAIN
TYPE: ACUTE PAIN

## 2023-12-28 NOTE — OR NURSING
1055: Pt arrived from OR post L hip nail under anesthesia. Pt is asleep. Sight dressing is CDI. Cardiac rhythm appears to be SR.     1140: Pt awake and alert.    1206: Report to JOLEEN Kirk.     1242: Pt back to room via bed with transport.
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
## 2023-12-28 NOTE — CARE PLAN
The patient is Stable - Low risk of patient condition declining or worsening    Shift Goals  Clinical Goals: pain control, mobility  Patient Goals: pain control  Family Goals: n/a    Progress made toward(s) clinical / shift goals:    Problem: Pain - Standard  Goal: Alleviation of pain or a reduction in pain to the patient’s comfort goal  Outcome: Progressing  Note: Pt states pain is relieved with current pain medication regimen. Pt medicated per MAR with + results. Pt provided ice packs and pillows for comfort.        Problem: Knowledge Deficit - Standard  Goal: Patient and family/care givers will demonstrate understanding of plan of care, disease process/condition, diagnostic tests and medications  Outcome: Progressing  Note: Pt educated on POC, plan for PT/OT tomorrow to determine discharge needs, pt verbalizes understanding, all questions answered.
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
## 2023-12-28 NOTE — CARE PLAN
The patient is Stable - Low risk of patient condition declining or worsening    Shift Goals  Clinical Goals: pain management, bowel movement, safety, NPO for surgery in am  Patient Goals: pain control, bowel movement  Family Goals: n/a    Progress made toward(s) clinical / shift goals:    Problem: Pain - Standard  Goal: Alleviation of pain or a reduction in pain to the patient’s comfort goal  Outcome: Progressing     Problem: Skin Integrity  Goal: Skin integrity is maintained or improved  Outcome: Progressing     Problem: Urinary Elimination  Goal: Establish and maintain regular urinary output  Outcome: Progressing     Problem: Mobility  Goal: Patient's capacity to carry out activities will improve  Outcome: Progressing     Problem: Infection - Standard  Goal: Patient will remain free from infection  Outcome: Progressing

## 2023-12-28 NOTE — ANESTHESIA TIME REPORT
Anesthesia Start and Stop Event Times       Date Time Event    12/28/2023 1017 Anesthesia Start     1100 Anesthesia Stop          Responsible Staff  12/28/23      Name Role Begin End    IRMA FoleyO. Anesth 1017 1100          Overtime Reason:  no overtime (within assigned shift)    Comments:

## 2023-12-28 NOTE — PROGRESS NOTES
1255  Pt arrived to unit via bed. A&O x 4, pain 4/10, VSS on 2L O2, x1 dressing to L hip in place, with all belongings at bedside. Pt oriented to unit, call light and belongings within reach, educated to call for assistance.    4 Eyes Skin Assessment Completed by JOLEEN Kirk and JOLEEN Mckinley.    Head WDL  Ears WDL  Nose WDL  Mouth WDL  Neck WDL  Breast/Chest WDL  Shoulder Blades WDL  Spine WDL  (R) Arm/Elbow/Hand WDL  (L) Arm/Elbow/Hand WDL  Abdomen WDL  Groin WDL  Scrotum/Coccyx/Buttocks WDL  (R) Leg Bruising  (L) Leg Bruising, x1 surgical site to hip dressing CDI  (R) Heel/Foot/Toe WDL  (L) Heel/Foot/Toe Abrasions          Devices In Places Blood Pressure Cuff, Pulse Ox, SCD's, and Nasal Cannula      Interventions In Place Pillows, Dri-Sy Pads, Heels Loaded W/Pillows, and Pressure Redistribution Mattress    Possible Skin Injury No    Pictures Uploaded Into Epic N/A  Wound Consult Placed N/A  RN Wound Prevention Protocol Ordered No

## 2023-12-28 NOTE — THERAPY
Physical Therapy Contact Note    Patient Name: Vanessa Crenshaw  Age:  38 y.o., Sex:  female  Medical Record #: [id number]  Today's Date: 12/28/2023    Pt out of room for procedure, will follow up as appropriate.    Gómez White PT, DPT

## 2023-12-28 NOTE — ANESTHESIA POSTPROCEDURE EVALUATION
Patient: Vanessa Gillilandom    Procedure Summary       Date: 12/28/23 Room / Location: [place] [address] / SURGERY [facility]    Anesthesia Start: 1017 Anesthesia Stop: 1100    Procedure: FIXATION, HIP, USING CANNULATED SCREW - SACROILIAC SCREW PELVIS (Pelvis) Diagnosis: (sacral fracture)    Surgeons: Ernst Sarmiento M.D. Responsible Provider: Erik Coyne D.O.    Anesthesia Type: general ASA Status: 3            Final Anesthesia Type: general  Last vitals  BP   Blood Pressure: 100/56    Temp   36.4 °C (97.6 °F)    Pulse   75   Resp   12    SpO2   98 %      Anesthesia Post Evaluation    Patient location during evaluation: PACU  Patient participation: complete - patient participated  Level of consciousness: awake and alert    Airway patency: patent  Anesthetic complications: no  Cardiovascular status: hemodynamically stable  Respiratory status: acceptable  Hydration status: euvolemic    PONV: none          No notable events documented.     Nurse Pain Score: 7 (NPRS)

## 2023-12-28 NOTE — ANESTHESIA PROCEDURE NOTES
Airway    Date/Time: 12/28/2023 10:22 AM    Performed by: Erik Coyne D.O.  Authorized by: Erik Coyne D.O.    Location:  OR  Urgency:  Elective  Difficult Airway: No    Indications for Airway Management:  Anesthesia      Spontaneous Ventilation: absent    Sedation Level:  Deep  Preoxygenated: Yes    Final Airway Type:  Supraglottic airway  Final Supraglottic Airway:  Standard LMA    SGA Size:  5  Number of Attempts at Approach:  1

## 2023-12-28 NOTE — ANESTHESIA PREPROCEDURE EVALUATION
Case: [id number] Date/Time: 12/28/23 0915    Procedure: FIXATION, HIP, USING CANNULATED SCREW - SACROILIAC SCREW PELVIS    Location: [place] [address] / SURGERY [facility]    Surgeons: Ernst Sarmiento M.D.            Relevant Problems   No relevant active problems       Physical Exam    Airway   Mallampati: III  TM distance: >3 FB  Neck ROM: full       Cardiovascular - normal exam  Rhythm: regular  Rate: normal  (-) murmur     Dental - normal exam           Pulmonary - normal exam  Breath sounds clear to auscultation     Abdominal    Neurological - normal exam                   Anesthesia Plan    ASA 3   ASA physical status 3 criteria: alcohol and/or substance dependence or abuse    Plan - general       Airway plan will be LMA          Induction: intravenous    Postoperative Plan: Postoperative administration of opioids is intended.    Pertinent diagnostic labs and testing reviewed    Informed Consent:    Anesthetic plan and risks discussed with patient.    Use of blood products discussed with: patient whom consented to blood products.

## 2023-12-29 PROCEDURE — 97530 THERAPEUTIC ACTIVITIES: CPT | Mod: CO

## 2023-12-29 PROCEDURE — 97535 SELF CARE MNGMENT TRAINING: CPT | Mod: CO

## 2023-12-29 PROCEDURE — 700102 HCHG RX REV CODE 250 W/ 637 OVERRIDE(OP): Performed by: PHYSICIAN ASSISTANT

## 2023-12-29 PROCEDURE — A9270 NON-COVERED ITEM OR SERVICE: HCPCS | Performed by: ORTHOPAEDIC SURGERY

## 2023-12-29 PROCEDURE — A9270 NON-COVERED ITEM OR SERVICE: HCPCS | Performed by: PHYSICIAN ASSISTANT

## 2023-12-29 PROCEDURE — 700102 HCHG RX REV CODE 250 W/ 637 OVERRIDE(OP): Performed by: EMERGENCY MEDICAL TECHNICIAN, INTERMEDIATE

## 2023-12-29 PROCEDURE — 97110 THERAPEUTIC EXERCISES: CPT

## 2023-12-29 PROCEDURE — 700102 HCHG RX REV CODE 250 W/ 637 OVERRIDE(OP): Performed by: ORTHOPAEDIC SURGERY

## 2023-12-29 PROCEDURE — 770001 HCHG ROOM/CARE - MED/SURG/GYN PRIV*

## 2023-12-29 PROCEDURE — 700111 HCHG RX REV CODE 636 W/ 250 OVERRIDE (IP): Mod: JZ | Performed by: ORTHOPAEDIC SURGERY

## 2023-12-29 PROCEDURE — A9270 NON-COVERED ITEM OR SERVICE: HCPCS | Performed by: EMERGENCY MEDICAL TECHNICIAN, INTERMEDIATE

## 2023-12-29 RX ORDER — CELECOXIB 100 MG/1
100 CAPSULE ORAL 2 TIMES DAILY
Status: DISCONTINUED | OUTPATIENT
Start: 2023-12-29 | End: 2024-01-02 | Stop reason: HOSPADM

## 2023-12-29 RX ADMIN — ACETAMINOPHEN 1000 MG: 500 TABLET ORAL at 17:31

## 2023-12-29 RX ADMIN — DOCUSATE SODIUM 50 MG AND SENNOSIDES 8.6 MG 2 TABLET: 8.6; 5 TABLET, FILM COATED ORAL at 04:16

## 2023-12-29 RX ADMIN — ENOXAPARIN SODIUM 40 MG: 100 INJECTION SUBCUTANEOUS at 17:31

## 2023-12-29 RX ADMIN — OXYCODONE HYDROCHLORIDE 10 MG: 10 TABLET ORAL at 18:36

## 2023-12-29 RX ADMIN — CELECOXIB 100 MG: 100 CAPSULE ORAL at 17:31

## 2023-12-29 RX ADMIN — METAXALONE 800 MG: 800 TABLET ORAL at 17:31

## 2023-12-29 RX ADMIN — OXYCODONE HYDROCHLORIDE 10 MG: 10 TABLET ORAL at 21:52

## 2023-12-29 RX ADMIN — DOCUSATE SODIUM 50 MG AND SENNOSIDES 8.6 MG 2 TABLET: 8.6; 5 TABLET, FILM COATED ORAL at 17:31

## 2023-12-29 RX ADMIN — OXYCODONE HYDROCHLORIDE 10 MG: 10 TABLET ORAL at 11:22

## 2023-12-29 RX ADMIN — CYCLOBENZAPRINE 5 MG: 10 TABLET, FILM COATED ORAL at 06:19

## 2023-12-29 RX ADMIN — OXYCODONE HYDROCHLORIDE 10 MG: 10 TABLET ORAL at 04:17

## 2023-12-29 RX ADMIN — TAMSULOSIN HYDROCHLORIDE 0.4 MG: 0.4 CAPSULE ORAL at 07:59

## 2023-12-29 RX ADMIN — OXYCODONE HYDROCHLORIDE 10 MG: 10 TABLET ORAL at 07:59

## 2023-12-29 RX ADMIN — ACETAMINOPHEN 1000 MG: 500 TABLET ORAL at 06:19

## 2023-12-29 RX ADMIN — METAXALONE 800 MG: 800 TABLET ORAL at 04:16

## 2023-12-29 RX ADMIN — OXYCODONE HYDROCHLORIDE 10 MG: 10 TABLET ORAL at 14:31

## 2023-12-29 RX ADMIN — METAXALONE 800 MG: 800 TABLET ORAL at 11:52

## 2023-12-29 ASSESSMENT — PAIN DESCRIPTION - PAIN TYPE
TYPE: SURGICAL PAIN
TYPE: ACUTE PAIN;SURGICAL PAIN
TYPE: SURGICAL PAIN
TYPE: ACUTE PAIN;SURGICAL PAIN
TYPE: SURGICAL PAIN

## 2023-12-29 ASSESSMENT — COGNITIVE AND FUNCTIONAL STATUS - GENERAL
DRESSING REGULAR UPPER BODY CLOTHING: A LITTLE
TURNING FROM BACK TO SIDE WHILE IN FLAT BAD: UNABLE
CLIMB 3 TO 5 STEPS WITH RAILING: TOTAL
DRESSING REGULAR LOWER BODY CLOTHING: A LOT
HELP NEEDED FOR BATHING: A LOT
DAILY ACTIVITIY SCORE: 15
EATING MEALS: A LITTLE
TOILETING: A LOT
MOVING TO AND FROM BED TO CHAIR: UNABLE
SUGGESTED CMS G CODE MODIFIER DAILY ACTIVITY: CK
WALKING IN HOSPITAL ROOM: TOTAL
MOBILITY SCORE: 7
PERSONAL GROOMING: A LITTLE
MOVING FROM LYING ON BACK TO SITTING ON SIDE OF FLAT BED: UNABLE
STANDING UP FROM CHAIR USING ARMS: A LOT
SUGGESTED CMS G CODE MODIFIER MOBILITY: CM

## 2023-12-29 ASSESSMENT — GAIT ASSESSMENTS
GAIT LEVEL OF ASSIST: REFUSED
DISTANCE (FEET): 6

## 2023-12-29 NOTE — CARE PLAN
The patient is Stable - Low risk of patient condition declining or worsening    Shift Goals  Clinical Goals: pain management, mobility  Patient Goals: pain control, rest  Family Goals: n/a    Progress made toward(s) clinical / shift goals:    Problem: Pain - Standard  Goal: Alleviation of pain or a reduction in pain to the patient’s comfort goal  Outcome: Progressing     Problem: Skin Integrity  Goal: Skin integrity is maintained or improved  Outcome: Progressing     Problem: Urinary Elimination  Goal: Establish and maintain regular urinary output  Outcome: Progressing     Problem: Mobility  Goal: Patient's capacity to carry out activities will improve  Outcome: Progressing     Problem: Infection - Standard  Goal: Patient will remain free from infection  Outcome: Progressing

## 2023-12-29 NOTE — PROGRESS NOTES
Called [phone] to page Dr. Sarmiento that patient requesting medications for cramping on her left calf and back.

## 2023-12-29 NOTE — THERAPY
"Occupational Therapy  Daily Treatment     Patient Name: Vanessa Crenshaw  Age:  38 y.o., Sex:  female  Medical Record #: [id number]  Today's Date: 12/29/2023     Precautions  Precautions: Fall Risk, Weight Bearing As Tolerated Right Lower Extremity, Toe Touch Weight Bearing Left Lower Extremity  Comments: pelvic fxs    Assessment    Pt was seen for OT treatment. Pt focuses on pain issues with all activity Pt gave good effort but did need encouragement throughout session. Mod A for STS from chair using FWW. Pt fearful of falling. Pt stated \"my left leg is killing me and I have fxs all around here\". Pointing to pelvic area. Once standing up pt is Min A for ADL transfers to/from [facility] using FWW and cues to maintain TTWB LLE.Pt urinated in BSC bur unable to wipe frontal area unless standing needing Min A to standing balance. Pt unable to fully reach buttocks and will need assist for thoroughness. LB dressing using AE seated base with Mod A due to pain issues. Handout of DME/AE issued and reviewed with pt.  Pt is not yet able to walk to BR and do toilet transfers. Pt needed encouragement to stay up in chair to eat breakfast and to increase activity tolerance and general endurance needed for all ADL's, I ADL's and ADL transfers. RN/CM updated on OT treatment findings and recommendations . Pt is motivated for more therapy. Will continue to follow.  Pt will need to be more Indep to return home to [facility].      Plan    Treatment Plan Status: (P) Continue Current Treatment Plan  Type of Treatment: Self Care / Activities of Daily Living, Adaptive Equipment, Neuro Re-Education / Balance, Therapeutic Exercises, Therapeutic Activity  Treatment Frequency: 5 Times per Week  Treatment Duration: Until Therapy Goals Met    DC Equipment Recommendations: (P) Unable to determine at this time (reacher and sock aid)  Discharge Recommendations: (P) Recommend post-acute placement for additional occupational therapy services prior to " "discharge home    Subjective    \"It hurts so much to move.\"  \"I feel like I'm not getting better\".      Objective       12/29/23 0905   Cognition    Cognition / Consciousness WDL   Level of Consciousness Alert   Comments Pt was pleasant and cooperative, reports feeling nervous about OOB but is motivated, Anxiety with movement causing pain.   Passive ROM Upper Body   Comments WFL   Active ROM Upper Body   Dominant Hand Right   Comments WFL   Strength Upper Body   Comments WFL for simple self care tasks.   Other Treatments   Other Treatments Provided Psychosocial intervention addressed. Discussed home help.   Balance   Sitting Balance (Static) Fair   Sitting Balance (Dynamic) Fair -   Standing Balance (Static) Fair -   Standing Balance (Dynamic) Poor +   Weight Shift Sitting Fair   Weight Shift Standing Absent  (TTWB LLE)   Comments with FWW   Bed Mobility    Comments NT Pt up in chair at the bedside prior to and post OT session.   Activities of Daily Living   Grooming Supervision;Seated   Bathing   (declined)   Lower Body Dressing Moderate Assist  (using AE and extended time to complete.)   Toileting Moderate Assist  (wiping post urination standing with CGA. Pt unable to reach buttocks and wipe thoroughly.)   Skilled Intervention Verbal Cuing;Tactile Cuing;Sequencing;Compensatory Strategies   Comments pt will need assist with most sef care tasks at this time.   Functional Mobility   Sit to Stand Moderate Assist   Bed, Chair, Wheelchair Transfer Minimal Assist   Toilet Transfers Minimal Assist  (to/ from BSC using FWW)   Transfer Method Stand Pivot   Mobility from chair to/from C   Comments with FWW and reminder cues for TTWB only LLE   Activity Tolerance   Comments pt demo only fair activity tolerance   Short Term Goals   Short Term Goal # 1 pt will demo ADL txfs w/ supv   Goal Outcome # 1 Progressing as expected   Short Term Goal # 2 pt will dress LB with supv and AE prn   Goal Outcome # 2 Progressing as expected "   Short Term Goal # 3 pt will demo toileting w/ supv   Goal Outcome # 3 Progressing slower than expected   Occupational Therapy Treatment Plan    O.T. Treatment Plan Continue Current Treatment Plan   Anticipated Discharge Equipment and Recommendations   DC Equipment Recommendations Unable to determine at this time  (reacher and sock aid)   Discharge Recommendations Recommend post-acute placement for additional occupational therapy services prior to discharge home   Interdisciplinary Plan of Care Collaboration   IDT Collaboration with  Nursing   Collaboration Comments RN updated

## 2023-12-29 NOTE — CARE PLAN
The patient is Stable - Low risk of patient condition declining or worsening    Shift Goals  Clinical Goals: pain control, mobility  Patient Goals: pain control  Family Goals: n/a    Progress made toward(s) clinical / shift goals:    Problem: Pain - Standard  Goal: Alleviation of pain or a reduction in pain to the patient’s comfort goal  Outcome: Progressing  Note: Pt states pain is relieved with current pain medication regimen. Pt medicated per MAR with + results. Pt provided ice packs and pillows for comfort.        Problem: Knowledge Deficit - Standard  Goal: Patient and family/care givers will demonstrate understanding of plan of care, disease process/condition, diagnostic tests and medications  Outcome: Progressing  Note: Pt educated on POC, plan for post acute placement prior to returning home, pt verbalizes understanding, all questions answered.

## 2023-12-29 NOTE — DISCHARGE PLANNING
Case Management Discharge Planning    Admission Date: 12/25/2023  GMLOS: 3.9  ALOS: 2    6-Clicks ADL Score: 15  6-Clicks Mobility Score: 8  PT and/or OT Eval ordered: Yes  Post-acute Referrals Ordered: Yes  Post-acute Choice Obtained: No  Has referral(s) been sent to post-acute provider:  Yes      Anticipated Discharge Dispo: Discharge Disposition: Disch to  rehab facility or distinct part unit (62)    DME Needed: No    Action(s) Taken: Updated Provider/Nurse on Discharge Plan    Patient discussed in IDT rounds. Patient is not medically cleared to discharge at this time. Patient had surgery on 12/28. Pending post-op PT evaluation. Pending PMR consult. LSW sent SNF referrals per SNF protocol. Plan of care ongoing.    Escalations Completed: PT    Medically Clear: No    Next Steps: Pending post-op therapy eval & PMR consult.    Barriers to Discharge: Medical clearance and Pending Placement    Is the patient up for discharge tomorrow: No

## 2023-12-30 PROCEDURE — 700102 HCHG RX REV CODE 250 W/ 637 OVERRIDE(OP): Performed by: ORTHOPAEDIC SURGERY

## 2023-12-30 PROCEDURE — A9270 NON-COVERED ITEM OR SERVICE: HCPCS | Performed by: ORTHOPAEDIC SURGERY

## 2023-12-30 PROCEDURE — 770001 HCHG ROOM/CARE - MED/SURG/GYN PRIV*

## 2023-12-30 PROCEDURE — 700111 HCHG RX REV CODE 636 W/ 250 OVERRIDE (IP): Mod: JZ | Performed by: ORTHOPAEDIC SURGERY

## 2023-12-30 PROCEDURE — A9270 NON-COVERED ITEM OR SERVICE: HCPCS | Performed by: EMERGENCY MEDICAL TECHNICIAN, INTERMEDIATE

## 2023-12-30 PROCEDURE — 700102 HCHG RX REV CODE 250 W/ 637 OVERRIDE(OP): Performed by: PHYSICIAN ASSISTANT

## 2023-12-30 PROCEDURE — 700102 HCHG RX REV CODE 250 W/ 637 OVERRIDE(OP): Performed by: EMERGENCY MEDICAL TECHNICIAN, INTERMEDIATE

## 2023-12-30 PROCEDURE — A9270 NON-COVERED ITEM OR SERVICE: HCPCS | Performed by: PHYSICIAN ASSISTANT

## 2023-12-30 RX ADMIN — ACETAMINOPHEN 1000 MG: 500 TABLET ORAL at 11:37

## 2023-12-30 RX ADMIN — CELECOXIB 100 MG: 100 CAPSULE ORAL at 04:18

## 2023-12-30 RX ADMIN — OXYCODONE HYDROCHLORIDE 10 MG: 10 TABLET ORAL at 10:56

## 2023-12-30 RX ADMIN — DOCUSATE SODIUM 50 MG AND SENNOSIDES 8.6 MG 2 TABLET: 8.6; 5 TABLET, FILM COATED ORAL at 04:18

## 2023-12-30 RX ADMIN — ACETAMINOPHEN 1000 MG: 500 TABLET ORAL at 01:18

## 2023-12-30 RX ADMIN — CELECOXIB 100 MG: 100 CAPSULE ORAL at 17:20

## 2023-12-30 RX ADMIN — OXYCODONE HYDROCHLORIDE 10 MG: 10 TABLET ORAL at 17:20

## 2023-12-30 RX ADMIN — METAXALONE 800 MG: 800 TABLET ORAL at 17:20

## 2023-12-30 RX ADMIN — OXYCODONE HYDROCHLORIDE 10 MG: 10 TABLET ORAL at 04:19

## 2023-12-30 RX ADMIN — OXYCODONE HYDROCHLORIDE 10 MG: 10 TABLET ORAL at 21:26

## 2023-12-30 RX ADMIN — ACETAMINOPHEN 1000 MG: 500 TABLET ORAL at 04:19

## 2023-12-30 RX ADMIN — DOCUSATE SODIUM 50 MG AND SENNOSIDES 8.6 MG 2 TABLET: 8.6; 5 TABLET, FILM COATED ORAL at 17:20

## 2023-12-30 RX ADMIN — OXYCODONE HYDROCHLORIDE 10 MG: 10 TABLET ORAL at 14:09

## 2023-12-30 RX ADMIN — METAXALONE 800 MG: 800 TABLET ORAL at 11:37

## 2023-12-30 RX ADMIN — ENOXAPARIN SODIUM 40 MG: 100 INJECTION SUBCUTANEOUS at 17:20

## 2023-12-30 RX ADMIN — TAMSULOSIN HYDROCHLORIDE 0.4 MG: 0.4 CAPSULE ORAL at 07:47

## 2023-12-30 RX ADMIN — POLYETHYLENE GLYCOL 3350 1 PACKET: 17 POWDER, FOR SOLUTION ORAL at 09:49

## 2023-12-30 RX ADMIN — OXYCODONE HYDROCHLORIDE 10 MG: 10 TABLET ORAL at 07:44

## 2023-12-30 RX ADMIN — METAXALONE 800 MG: 800 TABLET ORAL at 04:19

## 2023-12-30 RX ADMIN — OXYCODONE HYDROCHLORIDE 10 MG: 10 TABLET ORAL at 01:18

## 2023-12-30 ASSESSMENT — PAIN DESCRIPTION - PAIN TYPE
TYPE: ACUTE PAIN;SURGICAL PAIN

## 2023-12-30 NOTE — CARE PLAN
The patient is Stable - Low risk of patient condition declining or worsening    Shift Goals  Clinical Goals: pain control, BM, mobility  Patient Goals: pain control, comfort  Family Goals: no family present    Progress made toward(s) clinical / shift goals:    Problem: Pain - Standard  Goal: Alleviation of pain or a reduction in pain to the patient’s comfort goal  Outcome: Progressing   Educated on pain scale. Encouraged to verbalize pain. Will medicate as per MAR.    Problem: Knowledge Deficit - Standard  Goal: Patient and family/care givers will demonstrate understanding of plan of care, disease process/condition, diagnostic tests and medications  Outcome: Progressing   POC discussed with the patient. Questions answered. Verbalized understanding     Problem: Fall Risk  Goal: Patient will remain free from falls  Outcome: Progressing   Fall protocol in effect. Non skid socks in use. Call light within reach. Reminded patient to call for assist. Kept room clutter free. Hourly rounds in effect. Verbalized understanding.          Patient is not progressing towards the following goals:

## 2023-12-30 NOTE — THERAPY
Physical Therapy   Daily Treatment     Patient Name: Vanessa Crenshaw  Age:  38 y.o., Sex:  female  Medical Record #: [id number]  Today's Date: 12/29/2023     Precautions: Fall Risk;Weight Bearing As Tolerated Right Lower Extremity;Toe Touch Weight Bearing Left Lower Extremity  Comments: POD #1 transiliac screw    Assessment    Pt now POD #1. She is up in chair upon arrival, requests to defer transfer BTB or attempt gait 2/2 pain at this time. Pt reports feeling LLE stiffen up with upright activity. She demonstrated impaired strength and swelling throughout upper LLE limiting AROM. Discussed importance of AROM to reduce swelling and improve ability to perform TTWB. Pt instructed in seated marching, LLE LAQ, and LLE heel slide. Also encouraged her to perform heel slides while in bed. Provided her with waffle cushion to use in bed vs up in chair to improve comfort. PT to cont to follow.    Plan    Treatment Plan Status: Continue Current Treatment Plan  Type of Treatment: Bed Mobility, Equipment, Gait Training, Manual Therapy, Neuro Re-Education / Balance, Self Care / Home Evaluation, Stair Training, Therapeutic Activities, Therapeutic Exercise  Treatment Frequency: 5 Times per Week  Treatment Duration: Until Therapy Goals Met    DC Equipment Recommendations: Unable to determine at this time  Discharge Recommendations: Recommend post-acute placement for additional physical therapy services prior to discharge home     Objective    Cognition    Cognition / Consciousness WDL   Active ROM Lower Body    Comments L>R LE grossly limited by pain   Sitting Lower Body Exercises   Sitting Lower Body Exercises Yes   Hip Flexion Bilateral  (1 set of 5)   Long Arc Quad Left;1 set of 10   Hamstring Curl Left;1 set of 10   Other Treatments   Other Treatments Provided discussed importance of LLE AROM to limit swelling   Balance   Sitting Balance (Static) Fair   Sitting Balance (Dynamic) Fair -   Skilled Intervention Verbal  Cuing;Sequencing   Comments pt declined transfer   Gait Analysis   Gait Level Of Assist Refused   Functional Mobility   Sit to Stand Refused   How much difficulty does the patient currently have...   Turning over in bed (including adjusting bedclothes, sheets and blankets)? 1   Sitting down on and standing up from a chair with arms (e.g., wheelchair, bedside commode, etc.) 1   Moving from lying on back to sitting on the side of the bed? 1   How much help from another person does the patient currently need...   Moving to and from a bed to a chair (including a wheelchair)? 2   Need to walk in a hospital room? 1   Climbing 3-5 steps with a railing? 1   6 clicks Mobility Score 7   Activity Tolerance   Sitting in Chair pre/post   Short Term Goals    Short Term Goal # 1 Pt will perform supine<>sit from flat bed with SPV to progress bed mobility in 6 visits.   Goal Outcome # 1   (not addressed today)   Short Term Goal # 2 Pt will perform stand hop transfer with FWW and SPV to progress OOB mobility in 6 visits.   Goal Outcome # 2   (not adressed today)   Short Term Goal # 3 Pt will ambulate 25 ft with FWW and SBA to walk to/from bathroom in 6 visits.   Goal Outcome # 3 Goal not met   Short Term Goal # 4 Pt will navigate full FOS with SBA to access home in 6 visits.   Goal Outcome # 4 Goal not met

## 2023-12-30 NOTE — PROGRESS NOTES
Orthopedic PA Progress Note    Interval changes:  Patient doing well.   TTWB LLE  WBAT RLE  DVT Prophylaxis outpatient: ASA 81 mg PO BID x4 weeks  Follow up with Dr. Sarmiento 2 weeks postop  D/c planning in progress; anticipate SNF or rehab    ROS - Patient denies any new issues.  Denies any numbness or tingling. Pain well controlled.    /71   Pulse 78   Temp 36.7 °C (98.1 °F) (Temporal)   Resp 16   Wt 101 kg (222 lb 10.6 oz)   SpO2 97%     Patient seen and examined  No acute distress  Breathing non labored  RRR  BLE: Dressing CDI. Patient clearly fires tibialis anterior, EHL, and gastrocnemius/soleus. Sensation is intact to light touch throughout superficial peroneal, deep peroneal, tibial, saphenous, and sural nerve distributions. Strong and palpable 2+ dorsalis pedis and posterior tibial pulses with capillary refill less than 2 seconds.             Active Hospital Problems    Diagnosis     Pelvic fracture ([facility]) [S32.9XXA]      Priority: High    Trauma [T14.90XA]      Priority: Low    Multiple closed pelvic fractures with disruption of pelvic [place]ie IRA, initial encounter ([facility]) [S32.810A]        Assessment/Plan:  Patient doing well.   TTWB LLE  WBAT RLE  DVT Prophylaxis outpatient: ASA 81 mg PO BID x4 weeks  Follow up with Dr. Sarmiento 2 weeks postop  D/c planning in progress; anticipate SNF or rehab    POD#2 S/p  Transiliac transsacral screw   Wt bearing status - TTWB LLE  Future Procedures - none  Lovenox: Start 12/25, Duration-until ambulatory > 150'  Sutures/Staples out- 14-21 days post operatively. Removal will completed by ortho FACUNDO's unless transferred.  DVT Prophylaxis outpatient: ASA 81 mg PO BID x4 weeks  PT/OT-initiated  DVT Prophylaxis- TEDS/SCDs/Foot pumps  Case Coordination for Discharge Planning - likely needs post-acute placement

## 2023-12-30 NOTE — PROGRESS NOTES
0705 Patient's in bed. Bedside report received from LUIS Bustillos RN at the beginning of the shift.    0744 Patient's sitting up in bed. Educated on the importance/use of IS at least 10x every hour while awke, able to reach 2000. Medicated with Oxycodone (see MAR) for c/o's low back pain, rates pain 8/10, ice pack given. Fall protocol in effect. Call light within reach. Reminded patient to call for assist. Assessment completed. No distress noted. Plan of care reviewed with the   patient. Verbalized understanding.    0949 Miralax given (see MAR).    1135 Patient's in bed. No distress noted.     1300 Patient's sitting up in a chair. No distress noted.     1409 Medicated with Oxycodone (see MAR) for c/o's back pain, rates pain 7/10. Ice pack given.    1525 Patient's sitting up in  a chair. No distress noted.    1720 Medicated with Oxycodone (see MAR) for c/o's back pain, rates pain 8/10. Ice packs given.    1925 Patient's sitting up in a chair. No changes in status. Bedside report given to Suad SMITH RN (KIRAN).

## 2023-12-31 PROCEDURE — 700102 HCHG RX REV CODE 250 W/ 637 OVERRIDE(OP): Performed by: EMERGENCY MEDICAL TECHNICIAN, INTERMEDIATE

## 2023-12-31 PROCEDURE — A9270 NON-COVERED ITEM OR SERVICE: HCPCS | Performed by: EMERGENCY MEDICAL TECHNICIAN, INTERMEDIATE

## 2023-12-31 PROCEDURE — A9270 NON-COVERED ITEM OR SERVICE: HCPCS | Performed by: PHYSICIAN ASSISTANT

## 2023-12-31 PROCEDURE — 700111 HCHG RX REV CODE 636 W/ 250 OVERRIDE (IP): Mod: JZ | Performed by: ORTHOPAEDIC SURGERY

## 2023-12-31 PROCEDURE — 770001 HCHG ROOM/CARE - MED/SURG/GYN PRIV*

## 2023-12-31 PROCEDURE — A9270 NON-COVERED ITEM OR SERVICE: HCPCS | Performed by: ORTHOPAEDIC SURGERY

## 2023-12-31 PROCEDURE — 700102 HCHG RX REV CODE 250 W/ 637 OVERRIDE(OP): Performed by: PHYSICIAN ASSISTANT

## 2023-12-31 PROCEDURE — 97530 THERAPEUTIC ACTIVITIES: CPT

## 2023-12-31 PROCEDURE — 700102 HCHG RX REV CODE 250 W/ 637 OVERRIDE(OP): Performed by: ORTHOPAEDIC SURGERY

## 2023-12-31 RX ADMIN — OXYCODONE HYDROCHLORIDE 10 MG: 10 TABLET ORAL at 04:14

## 2023-12-31 RX ADMIN — METAXALONE 800 MG: 800 TABLET ORAL at 12:17

## 2023-12-31 RX ADMIN — OXYCODONE HYDROCHLORIDE 10 MG: 10 TABLET ORAL at 01:03

## 2023-12-31 RX ADMIN — OXYCODONE HYDROCHLORIDE 10 MG: 10 TABLET ORAL at 21:31

## 2023-12-31 RX ADMIN — CELECOXIB 100 MG: 100 CAPSULE ORAL at 04:14

## 2023-12-31 RX ADMIN — ENOXAPARIN SODIUM 40 MG: 100 INJECTION SUBCUTANEOUS at 16:45

## 2023-12-31 RX ADMIN — DOCUSATE SODIUM 50 MG AND SENNOSIDES 8.6 MG 2 TABLET: 8.6; 5 TABLET, FILM COATED ORAL at 04:14

## 2023-12-31 RX ADMIN — OXYCODONE HYDROCHLORIDE 10 MG: 10 TABLET ORAL at 16:42

## 2023-12-31 RX ADMIN — METAXALONE 800 MG: 800 TABLET ORAL at 16:42

## 2023-12-31 RX ADMIN — OXYCODONE HYDROCHLORIDE 10 MG: 10 TABLET ORAL at 12:18

## 2023-12-31 RX ADMIN — DOCUSATE SODIUM 50 MG AND SENNOSIDES 8.6 MG 2 TABLET: 8.6; 5 TABLET, FILM COATED ORAL at 16:42

## 2023-12-31 RX ADMIN — METAXALONE 800 MG: 800 TABLET ORAL at 04:14

## 2023-12-31 RX ADMIN — TAMSULOSIN HYDROCHLORIDE 0.4 MG: 0.4 CAPSULE ORAL at 08:04

## 2023-12-31 RX ADMIN — OXYCODONE HYDROCHLORIDE 10 MG: 10 TABLET ORAL at 08:04

## 2023-12-31 RX ADMIN — ACETAMINOPHEN 1000 MG: 500 TABLET ORAL at 08:04

## 2023-12-31 RX ADMIN — CELECOXIB 100 MG: 100 CAPSULE ORAL at 16:42

## 2023-12-31 ASSESSMENT — GAIT ASSESSMENTS
DEVIATION: ANTALGIC
ASSISTIVE DEVICE: FRONT WHEEL WALKER
GAIT LEVEL OF ASSIST: CONTACT GUARD ASSIST
DISTANCE (FEET): 4

## 2023-12-31 ASSESSMENT — COGNITIVE AND FUNCTIONAL STATUS - GENERAL
TURNING FROM BACK TO SIDE WHILE IN FLAT BAD: UNABLE
MOVING TO AND FROM BED TO CHAIR: UNABLE
MOVING FROM LYING ON BACK TO SITTING ON SIDE OF FLAT BED: A LITTLE
CLIMB 3 TO 5 STEPS WITH RAILING: TOTAL
SUGGESTED CMS G CODE MODIFIER MOBILITY: CL
STANDING UP FROM CHAIR USING ARMS: A LITTLE
MOBILITY SCORE: 11
WALKING IN HOSPITAL ROOM: A LOT

## 2023-12-31 ASSESSMENT — PAIN DESCRIPTION - PAIN TYPE
TYPE: ACUTE PAIN;SURGICAL PAIN

## 2023-12-31 NOTE — DISCHARGE PLANNING
PM&R referral sent by Erik Oconnor P.A.-C. On 12/26. Other orthopedic. Limited medical management based on chart review. Last therapy notes indicate refusal. WB restriction may be a barrier to return to her home 10-14 steps to enter.  Anticipate skilled nursing when medically cleared. No physiatry consult ordered per protocol. Case discussed with Dr. Callejas on 12/31.

## 2023-12-31 NOTE — THERAPY
Physical Therapy   Daily Treatment     Patient Name: Vanessa Crenshaw  Age:  38 y.o., Sex:  female  Medical Record #: [id number]  Today's Date: 12/31/2023     Precautions  Precautions: Fall Risk;Weight Bearing As Tolerated Right Lower Extremity;Toe Touch Weight Bearing Left Lower Extremity  Comments: s/p transiliac transsacral screw    Assessment    Pt seen for PT tx session. Pt with much improved mobility today vs the last time this therapist worked with pt pre-op. Able to perform sit<>stand to FWW with SBA and walk 4 ft with FWW and CGA in the room. However pt presented with poor R foot clearance during hop to pattern resulting in limited gait distance. Remains impaired by L>R LE swelling, decreased RLE power generation, and decreased endurance. Left pt comfortable in bed with BLE elevated in effort to decrease BLE swelling. Will follow.    Plan    Treatment Plan Status: Continue Current Treatment Plan  Type of Treatment: Bed Mobility, Equipment, Gait Training, Manual Therapy, Neuro Re-Education / Balance, Self Care / Home Evaluation, Stair Training, Therapeutic Activities, Therapeutic Exercise  Treatment Frequency: 5 Times per Week  Treatment Duration: Until Therapy Goals Met    DC Equipment Recommendations: Unable to determine at this time  Discharge Recommendations: Recommend post-acute placement for additional physical therapy services prior to discharge home      Subjective    Pt received up on [facility], agreeable to participate once finished. Reported that she has been doing her LE exercises as able but has been limited by LE swelling.     Objective       12/31/23 1158   Precautions   Precautions Fall Risk;Weight Bearing As Tolerated Right Lower Extremity;Toe Touch Weight Bearing Left Lower Extremity   Comments s/p transiliac transsacral screw   Vitals   O2 Delivery Device None - Room Air   Pain 0 - 10 Group   Therapist Pain Assessment During Activity;Post Activity Pain Same as Prior to Activity;Nurse  Notified  (c/o manageable medial R thigh pain when WB'ing through it)   Cognition    Cognition / Consciousness WDL   Level of Consciousness Alert   Comments very pleasant and cooperative   Balance   Sitting Balance (Static) Fair   Sitting Balance (Dynamic) Fair   Standing Balance (Static) Fair -   Standing Balance (Dynamic) Poor +   Weight Shift Sitting Fair   Weight Shift Standing Absent  (TTWB LLE)   Skilled Intervention Tactile Cuing;Verbal Cuing   Comments FWW   Bed Mobility    Sit to Supine Minimal Assist  (to lift BLE into bed)   Scooting Minimal Assist   Skilled Intervention Facilitation;Verbal Cuing   Comments up in chair pre   Gait Analysis   Gait Level Of Assist Contact Guard Assist   Assistive Device Front Wheel Walker   Distance (Feet) 4   # of Times Distance was Traveled 1   Deviation Antalgic  (hop to)   # of Stairs Climbed 0   Skilled Intervention Compensatory Strategies;Tactile Cuing;Verbal Cuing   Comments poor R foot clearance during hop to gait, lowered FWW in attempt to improve UE leverage with little improvement in foot clearance   Functional Mobility   Sit to Stand Standby Assist   Bed, Chair, Wheelchair Transfer Standby Assist   Transfer Method Stand Step   Mobility BSC>up in room with FWW>bed   Skilled Intervention Verbal Cuing   How much difficulty does the patient currently have...   Turning over in bed (including adjusting bedclothes, sheets and blankets)? 1   Sitting down on and standing up from a chair with arms (e.g., wheelchair, bedside commode, etc.) 3   Moving from lying on back to sitting on the side of the bed? 1   How much help from another person does the patient currently need...   Moving to and from a bed to a chair (including a wheelchair)? 3   Need to walk in a hospital room? 2   Climbing 3-5 steps with a railing? 1   6 clicks Mobility Score 11   Patient / Family Goals    Patient / Family Goal #1 to go to rehab   Goal #1 Outcome Goal not met   Short Term Goals    Short Term  Goal # 1 Pt will perform supine<>sit from flat bed with SPV to progress bed mobility in 6 visits.   Goal Outcome # 1 Progressing as expected   Short Term Goal # 2 Pt will perform stand hop transfer with FWW and SPV to progress OOB mobility in 6 visits.   Goal Outcome # 2 Progressing as expected   Short Term Goal # 3 Pt will ambulate 25 ft with FWW and SBA to walk to/from bathroom in 6 visits.   Goal Outcome # 3 Progressing slower than expected   Short Term Goal # 4 Pt will navigate full FOS with SBA to access home in 6 visits.   Goal Outcome # 4 Goal not met   Education Group   Additional Comments recommended that pt ask her mom to bring a closed toe shoe to wear on R foot to assist with gait quality/endurance/comfort   Physical Therapy Treatment Plan   Physical Therapy Treatment Plan Continue Current Treatment Plan   Anticipated Discharge Equipment and Recommendations   DC Equipment Recommendations Unable to determine at this time   Discharge Recommendations Recommend post-acute placement for additional physical therapy services prior to discharge home   Interdisciplinary Plan of Care Collaboration   IDT Collaboration with  Nursing   Patient Position at End of Therapy In Bed;Call Light within Reach;Tray Table within Reach;Phone within Reach  (with BLE elevated to decrease swelling)   Collaboration Comments RN updated   Session Information   Date / Session Number  12/31- 4(4/5, 1/1) OR 12/28

## 2023-12-31 NOTE — CARE PLAN
The patient is Stable - Low risk of patient condition declining or worsening    Shift Goals  Clinical Goals: pain control, mobility  Patient Goals: pain control, mobility  Family Goals: not present    Progress made toward(s) clinical / shift goals:      Problem: Pain - Standard  Goal: Alleviation of pain or a reduction in pain to the patient’s comfort goal  Outcome: Progressing     Problem: Knowledge Deficit - Standard  Goal: Patient and family/care givers will demonstrate understanding of plan of care, disease process/condition, diagnostic tests and medications  Outcome: Progressing     Problem: Skin Integrity  Goal: Skin integrity is maintained or improved  Outcome: Progressing       Patient is not progressing towards the following goals:

## 2023-12-31 NOTE — CARE PLAN
The patient is Stable - Low risk of patient condition declining or worsening    Shift Goals  Clinical Goals: pain mgmt, mobility  Patient Goals: pain control, rest, comfort  Family Goals: not present    Progress made toward(s) clinical / shift goals:        Problem: Pain - Standard  Goal: Alleviation of pain or a reduction in pain to the patient’s comfort goal  Outcome: Progressing     Problem: Knowledge Deficit - Standard  Goal: Patient and family/care givers will demonstrate understanding of plan of care, disease process/condition, diagnostic tests and medications  Outcome: Progressing     Problem: Skin Integrity  Goal: Skin integrity is maintained or improved  Outcome: Progressing     Problem: Fall Risk  Goal: Patient will remain free from falls  Outcome: Progressing       Patient is not progressing towards the following goals:

## 2024-01-01 PROCEDURE — A9270 NON-COVERED ITEM OR SERVICE: HCPCS | Performed by: PHYSICIAN ASSISTANT

## 2024-01-01 PROCEDURE — A9270 NON-COVERED ITEM OR SERVICE: HCPCS | Performed by: ORTHOPAEDIC SURGERY

## 2024-01-01 PROCEDURE — 700111 HCHG RX REV CODE 636 W/ 250 OVERRIDE (IP): Mod: JZ | Performed by: ORTHOPAEDIC SURGERY

## 2024-01-01 PROCEDURE — 700102 HCHG RX REV CODE 250 W/ 637 OVERRIDE(OP): Performed by: PHYSICIAN ASSISTANT

## 2024-01-01 PROCEDURE — 700102 HCHG RX REV CODE 250 W/ 637 OVERRIDE(OP): Performed by: EMERGENCY MEDICAL TECHNICIAN, INTERMEDIATE

## 2024-01-01 PROCEDURE — 700102 HCHG RX REV CODE 250 W/ 637 OVERRIDE(OP): Performed by: ORTHOPAEDIC SURGERY

## 2024-01-01 PROCEDURE — 770001 HCHG ROOM/CARE - MED/SURG/GYN PRIV*

## 2024-01-01 PROCEDURE — A9270 NON-COVERED ITEM OR SERVICE: HCPCS | Performed by: EMERGENCY MEDICAL TECHNICIAN, INTERMEDIATE

## 2024-01-01 RX ADMIN — TAMSULOSIN HYDROCHLORIDE 0.4 MG: 0.4 CAPSULE ORAL at 07:56

## 2024-01-01 RX ADMIN — METAXALONE 800 MG: 800 TABLET ORAL at 04:16

## 2024-01-01 RX ADMIN — METAXALONE 800 MG: 800 TABLET ORAL at 16:12

## 2024-01-01 RX ADMIN — DOCUSATE SODIUM 50 MG AND SENNOSIDES 8.6 MG 2 TABLET: 8.6; 5 TABLET, FILM COATED ORAL at 16:12

## 2024-01-01 RX ADMIN — DOCUSATE SODIUM 50 MG AND SENNOSIDES 8.6 MG 2 TABLET: 8.6; 5 TABLET, FILM COATED ORAL at 04:15

## 2024-01-01 RX ADMIN — OXYCODONE HYDROCHLORIDE 10 MG: 10 TABLET ORAL at 22:38

## 2024-01-01 RX ADMIN — OXYCODONE HYDROCHLORIDE 10 MG: 10 TABLET ORAL at 16:53

## 2024-01-01 RX ADMIN — OXYCODONE HYDROCHLORIDE 10 MG: 10 TABLET ORAL at 07:56

## 2024-01-01 RX ADMIN — OXYCODONE HYDROCHLORIDE 10 MG: 10 TABLET ORAL at 12:09

## 2024-01-01 RX ADMIN — ENOXAPARIN SODIUM 40 MG: 100 INJECTION SUBCUTANEOUS at 16:12

## 2024-01-01 RX ADMIN — METAXALONE 800 MG: 800 TABLET ORAL at 11:34

## 2024-01-01 RX ADMIN — OXYCODONE HYDROCHLORIDE 10 MG: 10 TABLET ORAL at 02:47

## 2024-01-01 RX ADMIN — CELECOXIB 100 MG: 100 CAPSULE ORAL at 04:16

## 2024-01-01 RX ADMIN — CELECOXIB 100 MG: 100 CAPSULE ORAL at 16:12

## 2024-01-01 ASSESSMENT — PAIN DESCRIPTION - PAIN TYPE
TYPE: ACUTE PAIN;SURGICAL PAIN

## 2024-01-01 NOTE — CARE PLAN
The patient is Stable - Low risk of patient condition declining or worsening    Shift Goals  Clinical Goals: Pain control, Mobility  Patient Goals: Pain control, Mobility  Family Goals: Not present    Progress made toward(s) clinical / shift goals:    Problem: Pain - Standard  Goal: Alleviation of pain or a reduction in pain to the patient’s comfort goal  Outcome: Progressing  Note: Patient educated on pain scale and to notify RN of pain. Medicated per MAR and repositioned        Problem: Knowledge Deficit - Standard  Goal: Patient and family/care givers will demonstrate understanding of plan of care, disease process/condition, diagnostic tests and medications  Outcome: Progressing  Note: Plan of care discussed, verbalized understanding. Questions answered        Problem: Skin Integrity  Goal: Skin integrity is maintained or improved  Outcome: Progressing     Problem: Urinary Elimination  Goal: Establish and maintain regular urinary output  Outcome: Progressing     Problem: Mobility  Goal: Patient's capacity to carry out activities will improve  Outcome: Progressing     Problem: Infection - Standard  Goal: Patient will remain free from infection  Outcome: Progressing     Problem: Fall Risk  Goal: Patient will remain free from falls  Outcome: Progressing       Patient is not progressing towards the following goals:

## 2024-01-01 NOTE — DISCHARGE PLANNING
Agency/Facility Name: [facility]  Spoke To: Sandy  Outcome: Not able to take any admits today due to staffing.    Agency/Facility Name: [facility]  Outcome: LVM for admissions regarding pt referral. Requested a call back.

## 2024-01-01 NOTE — CARE PLAN
The patient is Stable - Low risk of patient condition declining or worsening    Shift Goals  Clinical Goals: pain control, mobility  Patient Goals: pain control  Family Goals: Not present    Progress made toward(s) clinical / shift goals:    Problem: Pain - Standard  Goal: Alleviation of pain or a reduction in pain to the patient’s comfort goal  Outcome: Progressing  Note: Pt states pain is relieved with current pain medication regimen. Pt medicated per MAR with + results. Pt provided ice packs and pillows for comfort.        Problem: Knowledge Deficit - Standard  Goal: Patient and family/care givers will demonstrate understanding of plan of care, disease process/condition, diagnostic tests and medications  Outcome: Progressing  Note: Pt educated on POC, plan for discharge to SNF pending acceptance/bed availability, pt verbalizes understanding, all questions answered.

## 2024-01-01 NOTE — PROGRESS NOTES
Orthopedic PA Progress Note    Interval changes:  Patient doing well.TTWB LLE  WBAT RLE  DVT Prophylaxis outpatient: ASA 81 mg PO BID x4 weeks  Cleared for d/c from ortho POV  Awaiting SNF acceptance    ROS - Patient denies any new issues.  Denies any numbness or tingling. Pain well controlled.    /68   Pulse 82   Temp 36.7 °C (98.1 °F) (Temporal)   Resp 20   Wt 101 kg (222 lb 10.6 oz)   SpO2 97%     Patient seen and examined  No acute distress  Breathing non labored  RRR  BLE: Dressing CDI. Patient clearly fires tibialis anterior, EHL, and gastrocnemius/soleus. Sensation is intact to light touch throughout superficial peroneal, deep peroneal, tibial, saphenous, and sural nerve distributions. Strong and palpable 2+ dorsalis pedis and posterior tibial pulses with capillary refill less than 2 seconds.             Active Hospital Problems    Diagnosis     Pelvic fracture ([facility]) [S32.9XXA]      Priority: High    Trauma [T14.90XA]      Priority: Low    Multiple closed pelvic fractures with disruption of pelvic [place], initial encounter ([facility]) [S32.810A]        Assessment/Plan:  Patient doing well.TTWB LLE  WBAT RLE  DVT Prophylaxis outpatient: ASA 81 mg PO BID x4 weeks  Cleared for d/c from ortho POV  Awaiting SNF acceptance    POD#4 S/p  Transiliac transsacral screw   Wt bearing status - TTWB LLE  Future Procedures - none  Lovenox: Start 12/25, Duration-until ambulatory > 150'  Sutures/Staples out- 14-21 days post operatively. Removal will completed by ortho FACUNDO's unless transferred.  DVT Prophylaxis outpatient: ASA 81 mg PO BID x4 weeks  PT/OT-initiated  DVT Prophylaxis- TEDS/SCDs/Foot pumps  Case Coordination for Discharge Planning - likely needs post-acute placement

## 2024-01-01 NOTE — PROGRESS NOTES
Orthopedic PA Progress Note    Interval changes:  Patient doing well. Awaiting d/c facility acceptance  TTWB LLE  WBAT RLE  DVT Prophylaxis outpatient: ASA 81 mg PO BID x4 weeks  D/c planning in progress; anticipate SNF     ROS - Patient denies any new issues.  Denies any numbness or tingling. Pain well controlled.    /75   Pulse 97   Temp 36.7 °C (98.1 °F) (Temporal)   Resp 17   Wt 101 kg (222 lb 10.6 oz)   SpO2 98%     Patient seen and examined  No acute distress  Breathing non labored  RRR  BLE: Dressing CDI. Patient clearly fires tibialis anterior, EHL, and gastrocnemius/soleus. Sensation is intact to light touch throughout superficial peroneal, deep peroneal, tibial, saphenous, and sural nerve distributions. Strong and palpable 2+ dorsalis pedis and posterior tibial pulses with capillary refill less than 2 seconds.             Active Hospital Problems    Diagnosis     Pelvic fracture ([facility]) [S32.9XXA]      Priority: High    Trauma [T14.90XA]      Priority: Low    Multiple closed pelvic fractures with disruption of pelvic [place], initial encounter ([facility]) [S32.810A]        Assessment/Plan:  Patient doing well. Awaiting d/c facility acceptance  TTWB LLE  WBAT RLE  DVT Prophylaxis outpatient: ASA 81 mg PO BID x4 weeks  D/c planning in progress; anticipate SNF     POD#3 S/p  Transiliac transsacral screw   Wt bearing status - TTWB LLE  Future Procedures - none  Lovenox: Start 12/25, Duration-until ambulatory > 150'  Sutures/Staples out- 14-21 days post operatively. Removal will completed by ortho FACUNDO's unless transferred.  DVT Prophylaxis outpatient: ASA 81 mg PO BID x4 weeks  PT/OT-initiated  DVT Prophylaxis- TEDS/SCDs/Foot pumps  Case Coordination for Discharge Planning - likely needs post-acute placement

## 2024-01-02 ENCOUNTER — APPOINTMENT (OUTPATIENT)
Dept: RADIOLOGY | Facility: MEDICAL CENTER | Age: 39
DRG: 516 | End: 2024-01-02
Attending: EMERGENCY MEDICAL TECHNICIAN, INTERMEDIATE
Payer: MEDICAID

## 2024-01-02 VITALS
SYSTOLIC BLOOD PRESSURE: 119 MMHG | WEIGHT: 222.66 LBS | DIASTOLIC BLOOD PRESSURE: 62 MMHG | RESPIRATION RATE: 17 BRPM | TEMPERATURE: 97.9 F | OXYGEN SATURATION: 96 % | HEART RATE: 68 BPM | BODY MASS INDEX: 33.86 KG/M2

## 2024-01-02 PROBLEM — T14.90XA TRAUMA: Status: RESOLVED | Noted: 2023-12-25 | Resolved: 2024-01-02

## 2024-01-02 PROCEDURE — 97535 SELF CARE MNGMENT TRAINING: CPT | Mod: CO

## 2024-01-02 PROCEDURE — 700102 HCHG RX REV CODE 250 W/ 637 OVERRIDE(OP): Performed by: ORTHOPAEDIC SURGERY

## 2024-01-02 PROCEDURE — 97116 GAIT TRAINING THERAPY: CPT | Mod: CQ

## 2024-01-02 PROCEDURE — 700102 HCHG RX REV CODE 250 W/ 637 OVERRIDE(OP): Performed by: PHYSICIAN ASSISTANT

## 2024-01-02 PROCEDURE — A9270 NON-COVERED ITEM OR SERVICE: HCPCS | Performed by: EMERGENCY MEDICAL TECHNICIAN, INTERMEDIATE

## 2024-01-02 PROCEDURE — 93971 EXTREMITY STUDY: CPT | Mod: LT

## 2024-01-02 PROCEDURE — 700102 HCHG RX REV CODE 250 W/ 637 OVERRIDE(OP): Performed by: EMERGENCY MEDICAL TECHNICIAN, INTERMEDIATE

## 2024-01-02 PROCEDURE — A9270 NON-COVERED ITEM OR SERVICE: HCPCS | Performed by: ORTHOPAEDIC SURGERY

## 2024-01-02 PROCEDURE — A9270 NON-COVERED ITEM OR SERVICE: HCPCS | Performed by: PHYSICIAN ASSISTANT

## 2024-01-02 PROCEDURE — 97530 THERAPEUTIC ACTIVITIES: CPT | Mod: CQ

## 2024-01-02 PROCEDURE — 97530 THERAPEUTIC ACTIVITIES: CPT | Mod: CO

## 2024-01-02 RX ORDER — METAXALONE 800 MG/1
800 TABLET ORAL 3 TIMES DAILY
Qty: 90 TABLET | Refills: 0 | Status: SHIPPED
Start: 2024-01-02

## 2024-01-02 RX ORDER — ASPIRIN 81 MG/1
81 TABLET, CHEWABLE ORAL
Qty: 60 TABLET | Refills: 0 | Status: SHIPPED
Start: 2024-01-02 | End: 2024-01-24

## 2024-01-02 RX ORDER — CELECOXIB 100 MG/1
100 CAPSULE ORAL 2 TIMES DAILY
Qty: 60 CAPSULE | Refills: 0 | Status: SHIPPED
Start: 2024-01-02

## 2024-01-02 RX ORDER — TAMSULOSIN HYDROCHLORIDE 0.4 MG/1
0.4 CAPSULE ORAL
Qty: 30 CAPSULE | Refills: 0 | Status: SHIPPED
Start: 2024-01-03

## 2024-01-02 RX ORDER — ACETAMINOPHEN 500 MG
1000 TABLET ORAL EVERY 6 HOURS PRN
Qty: 30 TABLET | Refills: 0 | Status: SHIPPED
Start: 2024-01-02

## 2024-01-02 RX ORDER — OXYCODONE HYDROCHLORIDE 10 MG/1
10 TABLET ORAL EVERY 4 HOURS PRN
Qty: 28 TABLET | Refills: 0 | Status: SHIPPED | OUTPATIENT
Start: 2024-01-02 | End: 2024-01-07

## 2024-01-02 RX ORDER — AMOXICILLIN 250 MG
2 CAPSULE ORAL 2 TIMES DAILY
Qty: 30 TABLET | Refills: 0 | Status: SHIPPED
Start: 2024-01-02

## 2024-01-02 RX ORDER — OXYCODONE HYDROCHLORIDE 10 MG/1
10 TABLET ORAL EVERY 4 HOURS PRN
Qty: 28 TABLET | Refills: 0 | Status: SHIPPED | OUTPATIENT
Start: 2024-01-02 | End: 2024-01-02

## 2024-01-02 RX ADMIN — METAXALONE 800 MG: 800 TABLET ORAL at 11:57

## 2024-01-02 RX ADMIN — CELECOXIB 100 MG: 100 CAPSULE ORAL at 04:02

## 2024-01-02 RX ADMIN — METAXALONE 800 MG: 800 TABLET ORAL at 04:02

## 2024-01-02 RX ADMIN — DOCUSATE SODIUM 50 MG AND SENNOSIDES 8.6 MG 2 TABLET: 8.6; 5 TABLET, FILM COATED ORAL at 04:02

## 2024-01-02 RX ADMIN — OXYCODONE HYDROCHLORIDE 10 MG: 10 TABLET ORAL at 11:08

## 2024-01-02 RX ADMIN — OXYCODONE HYDROCHLORIDE 10 MG: 10 TABLET ORAL at 04:02

## 2024-01-02 RX ADMIN — TAMSULOSIN HYDROCHLORIDE 0.4 MG: 0.4 CAPSULE ORAL at 08:05

## 2024-01-02 ASSESSMENT — COGNITIVE AND FUNCTIONAL STATUS - GENERAL
WALKING IN HOSPITAL ROOM: A LOT
SUGGESTED CMS G CODE MODIFIER DAILY ACTIVITY: CK
MOBILITY SCORE: 11
DAILY ACTIVITIY SCORE: 19
STANDING UP FROM CHAIR USING ARMS: A LITTLE
DRESSING REGULAR LOWER BODY CLOTHING: A LITTLE
SUGGESTED CMS G CODE MODIFIER MOBILITY: CL
MOVING FROM LYING ON BACK TO SITTING ON SIDE OF FLAT BED: A LOT
HELP NEEDED FOR BATHING: A LITTLE
TOILETING: A LOT
CLIMB 3 TO 5 STEPS WITH RAILING: TOTAL
TURNING FROM BACK TO SIDE WHILE IN FLAT BAD: A LOT
MOVING TO AND FROM BED TO CHAIR: UNABLE
PERSONAL GROOMING: A LITTLE

## 2024-01-02 ASSESSMENT — PAIN DESCRIPTION - PAIN TYPE
TYPE: ACUTE PAIN;SURGICAL PAIN

## 2024-01-02 ASSESSMENT — GAIT ASSESSMENTS
DEVIATION: ANTALGIC
ASSISTIVE DEVICE: FRONT WHEEL WALKER
DISTANCE (FEET): 14
GAIT LEVEL OF ASSIST: CONTACT GUARD ASSIST
DISTANCE (FEET): 6

## 2024-01-02 NOTE — DISCHARGE PLANNING
DC Transport Scheduled    Received request at: 1/2/2024 at 1053    Transport Company Scheduled:  [employer]  Spoke with Socorro at [facility] to schedule transport.  [facility] Trip #: [id number]     Scheduled Date: 1/2/2024  Scheduled Time: 1330    Destination: [facility] at [address] [place]     Notified care team of scheduled transport via Vo[employer].     If there are any changes needed to the DC transportation scheduled, please contact [facility] at ext. [phone] between the hours of 8164-8826 Mon-Fri. If outside those hours, contact the ED Case Manager at ext. [phone].

## 2024-01-02 NOTE — PROGRESS NOTES
Brief Ortho Follow-Up:  - Persistent LLE swelling  - DVT study ordered   - Preliminary results show no acute DVT  - Plan for discharge today to SNF

## 2024-01-02 NOTE — PROGRESS NOTES
0815 Notified Ginny KLEIN of patient's LLE hot to palpation upon assessment this AM. Order for DVT study placed and in progress at this time.

## 2024-01-02 NOTE — THERAPY
"Occupational Therapy  Daily Treatment     Patient Name: Vanessa Crenshaw  Age:  38 y.o., Sex:  female  Medical Record #: [id number]  Today's Date: 1/2/2024     Precautions  Precautions: Fall Risk, Weight Bearing As Tolerated Right Lower Extremity, Toe Touch Weight Bearing Left Lower Extremity  Comments: s/p transiliac transsacral screw; x6 weeks; pelvic ring fx    Assessment    Pt was seen for OT  treatment. Pt does not always adhere to TTWB LLE with standing and ambulating ADL's needing reminder cues. Pt is making progress towards  OT STG's as demo with some seated ADL's.  UB dressing seated base with supervision/Mod Indep. LB dressing using AE with Min A and cues for sequencing. Pt needed reminder cues for some self are tasks. Pt gives good effort and is motivated in therapy. Pt did H/G tasks seated in chair fatigued post shower.  SBA for most ADL transfers using FWW to maintain TTWB LLE. Pt stated she could not reach fully around to buttocks and will need assist for thoroughness with full toilet hygiene. Pt is limited by decreased functional mobility, activity tolerance in standing, strength, balance,  which are affecting pt's ability to to complete ADL's,   I ADL's impacting functional Indep at baseline. Pt stated her pain level is an 8 and RN informed. RN updated on OT treatment findings and recommendations. Will continue to follow.      Plan    Treatment Plan Status: (P) Continue Current Treatment Plan  Type of Treatment: Self Care / Activities of Daily Living, Adaptive Equipment, Neuro Re-Education / Balance, Therapeutic Exercises, Therapeutic Activity  Treatment Frequency: 5 Times per Week  Treatment Duration: Until Therapy Goals Met    DC Equipment Recommendations: (P) Unable to determine at this time  Discharge Recommendations: (P) Recommend post-acute placement for additional occupational therapy services prior to discharge home    Subjective    \"I just had a shower and I did it by myself after I " "was set up by my CNA\".     Objective       01/02/24 1036   Cognition    Cognition / Consciousness WDL   Level of Consciousness Alert   Comments very pleasant and cooperative, motivated in therapy   Passive ROM Upper Body   Comments WFL   Active ROM Upper Body   Dominant Hand Right   Comments WFL   Strength Upper Body   Comments WFL for simple selfcare tasks.   Other Treatments   Other Treatments Provided Psychosocial intervention addressed. Discussed home help.  Reviewed DME and AE needs for home.   Balance   Sitting Balance (Static) Fair +   Sitting Balance (Dynamic) Fair   Standing Balance (Static) Fair   Standing Balance (Dynamic) Fair -   Weight Shift Sitting Fair   Weight Shift Standing Absent  (TTWB LLE)   Comments with FWW   Bed Mobility    Comments NT  Pt up with CNA just finished seated shower.   Activities of Daily Living   Eating Modified Independent   Grooming Modified Independent;Seated   Bathing   (CNA stated set up only for seated shower)   Upper Body Dressing Modified Independent   Lower Body Dressing Minimal Assist  (using AE seated base.)   Toileting Moderate Assist  (for full toilet hygiene in standing for thoroughness)   Skilled Intervention Tactile Cuing;Verbal Cuing;Sequencing;Compensatory Strategies   Comments Pt is making progress towards OT STG's. Will need to assess I ADL's and all ADL transfers to return home to [facility].   Functional Mobility   Sit to Stand Standby Assist   Bed, Chair, Wheelchair Transfer Standby Assist   Toilet Transfers Standby Assist   Transfer Method Stand Step   Mobility from EOB to/from BR   Comments with FWW and cues for TTWB   Activity Tolerance   Comments increasing in act tolerance in seated ADL's demo fair activity tolerance in standing.   Short Term Goals   Short Term Goal # 1 pt will demo ADL txfs w/ supv   Goal Outcome # 1 Progressing as expected   Short Term Goal # 2 pt will dress LB with supv and AE prn   Goal Outcome # 2 Progressing as expected   Short Term " Goal # 3 pt will demo toileting w/ supv   Goal Outcome # 3 Progressing slower than expected   Occupational Therapy Treatment Plan    O.T. Treatment Plan Continue Current Treatment Plan   Anticipated Discharge Equipment and Recommendations   DC Equipment Recommendations Unable to determine at this time   Discharge Recommendations Recommend post-acute placement for additional occupational therapy services prior to discharge home   Interdisciplinary Plan of Care Collaboration   IDT Collaboration with  Nursing   Collaboration Comments RN updated

## 2024-01-02 NOTE — THERAPY
Physical Therapy   Daily Treatment     Patient Name: Vanessa Crenshaw  Age:  38 y.o., Sex:  female  Medical Record #: [id number]  Today's Date: 1/2/2024     Precautions  Precautions: Fall Risk;Weight Bearing As Tolerated Right Lower Extremity;Toe Touch Weight Bearing Left Lower Extremity  Comments: s/p transiliac transsacral screw; x6 weeks; pelvic ring fx    Assessment    Pt greeted and seen for PT treatment. Pt was able to hop two short distance w/ CGA and FWW, but fatigues quickly and requires increased reliance on B UE to offload foot, she was able to maintain TTWB after discussing technique. Pt currently limited by impaired balance 2/2 pain, decreased strength, and decreased activity tolerance which negatively impacts functional mobility. Pt will continue to benefit from skilled PT to address deficits.       Plan    Treatment Plan Status: Continue Current Treatment Plan  Type of Treatment: Bed Mobility, Equipment, Gait Training, Manual Therapy, Neuro Re-Education / Balance, Self Care / Home Evaluation, Stair Training, Therapeutic Activities, Therapeutic Exercise  Treatment Frequency: 5 Times per Week  Treatment Duration: Until Therapy Goals Met    DC Equipment Recommendations: Unable to determine at this time  Discharge Recommendations: Recommend post-acute placement for additional physical therapy services prior to discharge home       01/02/24 1102   Sitting Lower Body Exercises   Comments Pt performed LAQx5, marching x5, and glute sets x5.   Balance   Sitting Balance (Static) Fair +   Sitting Balance (Dynamic) Fair +   Standing Balance (Static) Fair   Standing Balance (Dynamic) Fair -   Weight Shift Sitting Fair   Weight Shift Standing Absent   Skilled Intervention Verbal Cuing;Sequencing   Comments w/ FWW   Bed Mobility    Comments up in chair pre/post   Gait Analysis   Gait Level Of Assist Contact Guard Assist   Assistive Device Front Wheel Walker   Distance (Feet) 6   # of Times Distance was  Traveled 2   Deviation Antalgic  (step hop)   Weight Bearing Status WBAT R LE, TTWB L LE   Skilled Intervention Verbal Cuing;Compensatory Strategies;Sequencing   Comments dovetailed with THORNTON, who stated pt was unable to maintain TTWB during hopping. When discussing ambulation w/ pt she stated she was not able to hop. After discussing and demo'ing technique pt was able to step hop short distance with increased reliance on B UE. Pt fatigues quickly   Functional Mobility   Sit to Stand Standby Assist   Bed, Chair, Wheelchair Transfer Standby Assist   Transfer Method Stand Step   Mobility chair<>3ft x2   Skilled Intervention Verbal Cuing;Sequencing   Comments w/ FWW able to stand maintaining TTWB   Short Term Goals    Short Term Goal # 1 Pt will perform supine<>sit from flat bed with SPV to progress bed mobility in 6 visits.   Goal Outcome # 1 Progressing as expected   Short Term Goal # 2 Pt will perform stand hop transfer with FWW and SPV to progress OOB mobility in 6 visits.   Goal Outcome # 2 Progressing as expected   Short Term Goal # 3 Pt will ambulate 25 ft with FWW and SBA to walk to/from bathroom in 6 visits.   Goal Outcome # 3 Progressing slower than expected   Short Term Goal # 4 Pt will navigate full FOS with SBA to access home in 6 visits.   Goal Outcome # 4 Goal not met   Supervising Physical Therapist (PTA Treatments Only)   Supervising Physical Therapist Ling Leung

## 2024-01-02 NOTE — DISCHARGE PLANNING
Case Management Discharge Planning    Admission Date: 12/25/2023  GMLOS: 3.9  ALOS: 6    6-Clicks ADL Score: 15  6-Clicks Mobility Score: 11  PT and/or OT Eval ordered: Yes  Post-acute Referrals Ordered: Yes  Post-acute Choice Obtained: No  Has referral(s) been sent to post-acute provider:  Yes      Anticipated Discharge Dispo: Discharge Disposition: D/T to SNF with Medicare cert in anticipation of skilled care (03)    DME Needed: No    Action(s) Taken: Updated Provider/Nurse on Discharge Plan    LSW completed chart review. Patient is medically cleared to discharge to SNF pending DVT r/o. Patient has been accepted by [facility], [facility], and [facility].     PASRR: [id number]    LOC: manual review      Update@1024:     LSW met with patient at bedside to discuss dc plan. Patient is agreeable to discharging to SNF and provided choice for [facility].     LOC: [id number]     LSW left voicemail for Ama[facility]/admissions with [facility] inquiring about bed availability.     Choice form faxed to M[facility] Sandra.     @1042:     LSW received phone call from Natasha/Admissions who reported to have bed availability. LSW to request transport time of 1330.  Escalations Completed: None    Medically Clear: Yes, pending DVT r/o    Next Steps: LSW to obtain SNF choice, inquire about bed availability, follow up on LOC (manual review)    Barriers to Discharge: Medical clearance and Pending Placement    Is the patient up for discharge tomorrow: No, potentially.

## 2024-01-02 NOTE — CARE PLAN
The patient is Stable - Low risk of patient condition declining or worsening    Shift Goals  Clinical Goals: Pain control, MObility  Patient Goals: Pain control  Family Goals: Not present    Progress made toward(s) clinical / shift goals:    Problem: Pain - Standard  Goal: Alleviation of pain or a reduction in pain to the patient’s comfort goal  Outcome: Progressing  Note: Patient educated on pain scale and to notify RN of pain. Medicated per MAR and repositioned        Problem: Knowledge Deficit - Standard  Goal: Patient and family/care givers will demonstrate understanding of plan of care, disease process/condition, diagnostic tests and medications  Outcome: Progressing  Note: Plan of care discussed, verbalized understanding. Questions answered        Problem: Skin Integrity  Goal: Skin integrity is maintained or improved  Outcome: Progressing     Problem: Urinary Elimination  Goal: Establish and maintain regular urinary output  Outcome: Progressing     Problem: Mobility  Goal: Patient's capacity to carry out activities will improve  Outcome: Progressing     Problem: Infection - Standard  Goal: Patient will remain free from infection  Outcome: Progressing     Problem: Fall Risk  Goal: Patient will remain free from falls  Outcome: Progressing       Patient is not progressing towards the following goals:

## 2024-01-02 NOTE — DISCHARGE SUMMARY
DISCHARGE SUMMARY    PATIENTS NAME: Vanessa Crenshaw    MRN: [id number]    CSN: [id number]    ADMIT DATE:  12/25/2023    DISCHARGE DATE: 1/2/2024    ADMIT MD: Ernst Sarmiento M.D.    DISCHARGE MD: Ernst Sarmiento M.D.    REASON FOR ADMIT: Pain control, surgical planning, PT/OT, discharge planning    PRINCIPLE DIAGNOSIS:Zone 3 sacral fracture     SECONDARY DIAGNOSIS: Seasonal allergies    PROCEDURES: 12/28/2023  Ernst Sarmiento M.D.   Transiliac transsacral screw     CONSULTATIONS: Ernst Sarmiento M.D.    Patient Active Problem List    Diagnosis Date Noted    Pelvic fracture (HCC) 12/25/2023     Priority: High    Fracture of ribs, three 11/08/2012     Priority: High    Pneumothorax, left 11/08/2012     Priority: Medium    Pulmonary contusion 11/08/2012     Priority: Medium    Traumatic cephalohematoma 11/08/2012     Priority: Medium    Thoracic spine fracture (HCC) 11/08/2012     Priority: Low    Multiple closed pelvic fractures with disruption of pelvic [place], initial encounter ([facility]) 12/25/2023    Closed fracture of four ribs 11/08/2012       HOSPITAL COURSE: Patient is a 39 yo female. She was initially seen by   in the [facility] ER.  Dr. Sarmiento was consulted for orthopaedics.  He felt that the nature of the patients fractures necessitated surgical intervention.  After explaining the indications, risks, benefits, and alternatives the patient wished to proceed with surgical intervention.  The patient was taken to the OR for the above mentioned procedure.  She had no complications and minimal blood loss. She has done well with mobilization and her pain has been well controlled with oral medications. She is now ready for DC at this time.     DISCHARGE LOCATION:  [facility]    DVT PROPHYLAXSIS: ASA 81 mg PO BID x4 weeks     ANTIBIOTICS: None    WEIGHT BEARING:TTWB LLE    FOLLOW UP: 10-14 days post operatively with Ernst Sarmiento M.D.    DISCHARGE DIAGNOSIS:Pelvic fracture, disruption of pelvic  ring    MEDICATIONS:   Current Outpatient Medications   Medication Sig Dispense Refill    acetaminophen (TYLENOL) 500 MG Tab Take 2 Tablets by mouth every 6 hours as needed for Mild Pain. 30 Tablet 0    celecoxib (CELEBREX) 100 MG Cap Take 1 Capsule by mouth 2 times a day. 60 Capsule 0    metaxalone (SKELAXIN) 800 MG Tab Take 1 Tablet by mouth 3 times a day. 90 Tablet 0    senna-docusate (PERICOLACE OR SENOKOT S) 8.6-50 MG Tab Take 2 Tablets by mouth 2 times a day. 30 Tablet 0    [START ON 1/3/2024] tamsulosin (FLOMAX) 0.4 MG capsule Take 1 Capsule by mouth 1/2 hour after breakfast. 30 Capsule 0    oxyCODONE immediate release (ROXICODONE) 10 MG immediate release tablet Take 1 Tablet by mouth every four hours as needed for Severe Pain for up to 5 days. 28 Tablet 0    aspirin (ASA) 81 MG Chew Tab chewable tablet Chew 1 Tablet 2 (two) times a day. 60 Tablet 0

## 2024-01-02 NOTE — PROGRESS NOTES
Patient transported via wheelchair to [facility] with 1 transport employee. Discharge   instructions and controlled consent forms discussed and signed by patient. PIV removed, pt with all belongings.    Attempted to give report to oncoming RN at [facility], left voicemail x2 with this RN's call back number.

## 2024-01-19 ENCOUNTER — APPOINTMENT (OUTPATIENT)
Dept: RADIOLOGY | Facility: MEDICAL CENTER | Age: 39
End: 2024-01-19
Attending: EMERGENCY MEDICINE
Payer: MEDICAID

## 2024-01-19 ENCOUNTER — HOSPITAL ENCOUNTER (EMERGENCY)
Facility: MEDICAL CENTER | Age: 39
End: 2024-01-19
Attending: EMERGENCY MEDICINE
Payer: MEDICAID

## 2024-01-19 VITALS
HEIGHT: 68 IN | HEART RATE: 84 BPM | OXYGEN SATURATION: 96 % | DIASTOLIC BLOOD PRESSURE: 74 MMHG | TEMPERATURE: 98.2 F | SYSTOLIC BLOOD PRESSURE: 136 MMHG | RESPIRATION RATE: 17 BRPM | BODY MASS INDEX: 37.89 KG/M2 | WEIGHT: 250 LBS

## 2024-01-19 DIAGNOSIS — I82.432 ACUTE DEEP VEIN THROMBOSIS (DVT) OF POPLITEAL VEIN OF LEFT LOWER EXTREMITY (HCC): ICD-10-CM

## 2024-01-19 DIAGNOSIS — T79.2XXA SEROMA, POST-TRAUMATIC (HCC): ICD-10-CM

## 2024-01-19 PROCEDURE — 87015 SPECIMEN INFECT AGNT CONCNTJ: CPT

## 2024-01-19 PROCEDURE — 10160 PNXR ASPIR ABSC HMTMA BULLA: CPT

## 2024-01-19 PROCEDURE — 700111 HCHG RX REV CODE 636 W/ 250 OVERRIDE (IP): Mod: JZ,JG,UD | Performed by: EMERGENCY MEDICINE

## 2024-01-19 PROCEDURE — A9270 NON-COVERED ITEM OR SERVICE: HCPCS | Mod: UD | Performed by: EMERGENCY MEDICINE

## 2024-01-19 PROCEDURE — 700102 HCHG RX REV CODE 250 W/ 637 OVERRIDE(OP): Mod: UD | Performed by: EMERGENCY MEDICINE

## 2024-01-19 PROCEDURE — 87205 SMEAR GRAM STAIN: CPT

## 2024-01-19 PROCEDURE — 700101 HCHG RX REV CODE 250: Mod: UD | Performed by: EMERGENCY MEDICINE

## 2024-01-19 PROCEDURE — 20605 DRAIN/INJ JOINT/BURSA W/O US: CPT

## 2024-01-19 PROCEDURE — 76882 US LMTD JT/FCL EVL NVASC XTR: CPT | Mod: LT

## 2024-01-19 PROCEDURE — 96374 THER/PROPH/DIAG INJ IV PUSH: CPT | Mod: XU

## 2024-01-19 PROCEDURE — 93971 EXTREMITY STUDY: CPT | Mod: LT

## 2024-01-19 PROCEDURE — 87070 CULTURE OTHR SPECIMN AEROBIC: CPT

## 2024-01-19 PROCEDURE — 99284 EMERGENCY DEPT VISIT MOD MDM: CPT

## 2024-01-19 RX ORDER — OXYCODONE HYDROCHLORIDE 5 MG/1
5 TABLET ORAL ONCE
Status: COMPLETED | OUTPATIENT
Start: 2024-01-19 | End: 2024-01-19

## 2024-01-19 RX ORDER — MORPHINE SULFATE 4 MG/ML
4 INJECTION INTRAVENOUS ONCE
Status: COMPLETED | OUTPATIENT
Start: 2024-01-19 | End: 2024-01-19

## 2024-01-19 RX ADMIN — OXYCODONE HYDROCHLORIDE 5 MG: 5 TABLET ORAL at 20:51

## 2024-01-19 RX ADMIN — MORPHINE SULFATE 4 MG: 4 INJECTION, SOLUTION INTRAMUSCULAR; INTRAVENOUS at 20:51

## 2024-01-19 RX ADMIN — LIDOCAINE HYDROCHLORIDE 10 ML: 10; .005 INJECTION, SOLUTION EPIDURAL; INFILTRATION; INTRACAUDAL; PERINEURAL at 19:45

## 2024-01-19 RX ADMIN — RIVAROXABAN 15 MG: 15 TABLET, FILM COATED ORAL at 20:50

## 2024-01-19 ASSESSMENT — FIBROSIS 4 INDEX: FIB4 SCORE: 1.26

## 2024-01-20 LAB
GRAM STN SPEC: NORMAL
SIGNIFICANT IND 70042: NORMAL
SITE SITE: NORMAL
SOURCE SOURCE: NORMAL

## 2024-01-20 NOTE — ED TRIAGE NOTES
".  Chief Complaint   Patient presents with    Leg Swelling     Pt bib resma from [facility] rehab with left lower leg swelling, leg swollen and warm to touch x 1 day, states she is in rehab after pelvic fracture from being rolled over by a log beginning        ./63   Pulse 78   Temp 36.6 °C (97.9 °F) (Temporal)   Resp 18   Ht 1.727 m (5' 8\")   Wt 113 kg (250 lb)   SpO2 98%   BMI 38.01 kg/m²     "

## 2024-01-20 NOTE — ED NOTES
Assisted pt. To restroom via wheelchair, no signs of distress, .Pt. Resting, no changes, 3 p's addressed, call light at bedside, poc updated

## 2024-01-20 NOTE — DISCHARGE PLANNING
SW asked to assist with transportation of Pt back to [facility].  JAIMIE called [facility] and requested W/C transportation of Pt back to [place] with [facility].  Per [facility] they are not able to provide W/C level transportation again until tomorrow during business hours.  JAIMIE placed call to [employer] and arranged w/c level transportation (res#[phone]) for [id number]  time. COBRA and transfer packet completed and provided to ER RN.

## 2024-01-20 NOTE — ED NOTES
Bedside report received from previous shift.   Assumed patient care. Verified patient identification.  Checked on bed, connected to monitor,  with unlabored respirations. Discussed plan of care.   Vital signs is stable. Denied any new complaints.   Gurney in low position, side rail up for pt safety. Call light within reach.   No needs identified at the moment. Instructed to use call light when needed.    Contraptions:IV on right arm  Alert and Oriented: X4  Ambulatory: no  Oxygen: room air   Pending: US and CT

## 2024-01-20 NOTE — ED PROVIDER NOTES
ED Provider Note    CHIEF COMPLAINT  Chief Complaint   Patient presents with    Leg Swelling     Pt bib resma from [facility] rehab with left lower leg swelling, leg swollen and warm to touch x 1 day, states she is in rehab after pelvic fracture from being rolled over by a log beginning        EXTERNAL RECORDS REVIEWED  Discharge summary 1/2/2024, sacral fracture, surgical fixation    HPI/ROS    Vanessa Crenshaw is a 38 y.o. female who presents for evaluation of left leg swelling and pain.  She was admitted to the hospital couple weeks ago after pelvic fractures, ORIF.  After her hospitalization she was sent to rehab facility.  While there, she has been experiencing worsening left leg pain and swelling.  She been treated with diuretics and elevation.  Sent here to rule out DVT.  No weakness or numbness.  She does have a painful burning sensation involving this leg primarily on the lateral aspect.    PAST MEDICAL HISTORY   has a past medical history of Asthma, Dental disorder, and Psychiatric problem.    SURGICAL HISTORY   has a past surgical history that includes hysteroscopy with myosure (4/21/2021); dilation and curettage (4/21/2021); abdominal exploration; and percut fix prox/neck femur fx (N/A, 12/28/2023).    FAMILY HISTORY  Family History   Problem Relation Age of Onset    Diabetes Mother     Hypertension Father     Heart Disease Father        SOCIAL HISTORY  Social History     Tobacco Use    Smoking status: Every Day     Current packs/day: 1.00     Types: Cigarettes    Smokeless tobacco: Never   Vaping Use    Vaping Use: Never used   Substance and Sexual Activity    Alcohol use: Yes     Comment: 6 week    Drug use: Yes     Types: Inhaled     Comment: meth hx, marajuana occassionally on Sunday 4/18    Sexual activity: Yes       CURRENT MEDICATIONS  Home Medications       Reviewed by Cece Ureña R.N. (Registered Nurse) on 01/19/24 at 4560  Med List Status: Partial     Medication Last Dose Status  "  acetaminophen (TYLENOL) 500 MG Tab  Active   albuterol 108 (90 Base) MCG/ACT Aero Soln inhalation aerosol  Active   aspirin (ASA) 81 MG Chew Tab chewable tablet  Active   celecoxib (CELEBREX) 100 MG Cap  Active   Cyanocobalamin (VITAMIN B-12 PO)  Active   fluticasone (FLONASE) 50 MCG/ACT nasal spray  Active   loratadine (CLARITIN) 10 MG Tab  Active   metaxalone (SKELAXIN) 800 MG Tab  Active   potassium chloride (KLOR-CON) 20 MEQ Pack  Active   pseudoephedrine (SUDAFED) 30 MG Tab  Active   senna-docusate (PERICOLACE OR SENOKOT S) 8.6-50 MG Tab  Active   tamsulosin (FLOMAX) 0.4 MG capsule  Active                    ALLERGIES  Allergies   Allergen Reactions    Molds & Smuts      Pollens and molds; seasonal allergies       PHYSICAL EXAM  VITAL SIGNS: /63   Pulse 78   Temp 36.6 °C (97.9 °F) (Temporal)   Resp 18   Ht 1.727 m (5' 8\")   Wt 113 kg (250 lb)   SpO2 98%   BMI 38.01 kg/m²    Constitutional: Alert in no apparent distress.  HENT: No signs of significant acute trauma.   Eyes: Conjunctiva normal, non-icteric.   Chest: Normal nonlabored respirations  Skin: No appreciable rash on the exposed skin  Musculoskeletal: Obvious asymmetric edema of the left lower extremity.  Minimal erythema.  Neurovascular intact.  She has a focal area of soft edema, almost feels fluctuant, involving the lateral calf  Neurologic: Alert, no obvious focal deficits noted.        DIAGNOSTIC STUDIES / PROCEDURES    RADIOLOGY  I have independently interpreted the diagnostic imaging associated with this visit and am waiting the final reading from the radiologist.   My preliminary interpretation is as follows: Large fluid collection  Radiologist interpretation:   US-EXTREMITY NON VASCULAR UNILATERAL LEFT   Final Result      US-EXTREMITY VENOUS LOWER UNILAT LEFT    (Results Pending)             Needle Aspiration Procedure Note    Indication: Left leg fluid collection    Procedure: The patient was positioned appropriately and the skin " over the incision site was prepped with alcohol. Local anesthesia was obtained by infiltration using 2% Lidocaine with epinephrine.  An 18-gauge needle was inserted into the area of greatest fluctuance and 130 mL of a serosanguineous thin liquid was aspirated.    The patient tolerated the procedure well.    Complications: None     COURSE & MEDICAL DECISION MAKING    ED Observation Status? No; Patient does not meet criteria for ED Observation.     INITIAL ASSESSMENT, COURSE AND PLAN  Care Narrative: 38-year-old female with a recent pelvic trauma comes in with left leg edema, concerning for DVT.  She also has a focal area of almost fluctuant feeling edema involving the lateral left calf.  Will obtain a DVT study.  Will also obtain of local soft tissue ultrasound.  She is on diuretics because of this edema, she has been keeping the leg elevated.  Is soundly she was sent to emergency department specifically to exclude DVT from her skilled nursing facility    The ultrasound reveals a large hypoechoic fluid collection, clinically I thought it likely would be a hematoma but then I performed a needle aspiration was able to aspirate 130 cc of a serosanguineous thin liquid.  I did send a sample of the cell for culture as she is currently being treated for cellulitis but I do not think it is infectious in nature.  The DVT study is obtained, and this is positive for a popliteal artery DVT.  For this she will be treated with Xarelto, first dose in the emergency department and follow-up in the anticoagulation clinic.  Referral has been placed.  Discharged home in stable condition with strict return instructions provided  Prescription for Xarelto    DISPOSITION AND DISCUSSIONS      FINAL DIAGNOSIS  1. Acute deep vein thrombosis (DVT) of popliteal vein of left lower extremity (HCC)    2. Seroma, post-traumatic (HCC)           Electronically signed by: Antonio Hogue M.D., 1/19/2024 6:12 PM

## 2024-01-20 NOTE — ED NOTES
[facility] transport at bedside; packet provided; D/c paper given to patient; all questions answered; verbalized understanding on d/c instructions; left per WC AOX4 GCS15 on room air; all belongings with patient

## 2024-01-22 LAB
BACTERIA FLD AEROBE CULT: NORMAL
GRAM STN SPEC: NORMAL
SIGNIFICANT IND 70042: NORMAL
SITE SITE: NORMAL
SOURCE SOURCE: NORMAL

## 2024-01-24 ENCOUNTER — ANTICOAGULATION VISIT (OUTPATIENT)
Dept: VASCULAR LAB | Facility: MEDICAL CENTER | Age: 39
End: 2024-01-24
Attending: INTERNAL MEDICINE
Payer: MEDICAID

## 2024-01-24 ENCOUNTER — DOCUMENTATION (OUTPATIENT)
Dept: VASCULAR LAB | Facility: MEDICAL CENTER | Age: 39
End: 2024-01-24

## 2024-01-24 DIAGNOSIS — I82.452 ACUTE DEEP VEIN THROMBOSIS (DVT) OF LEFT PERONEAL VEIN (HCC): ICD-10-CM

## 2024-01-24 PROBLEM — I82.409 DEEP VEIN THROMBOSIS (HCC): Status: ACTIVE | Noted: 2024-01-24

## 2024-01-24 PROCEDURE — 99213 OFFICE O/P EST LOW 20 MIN: CPT

## 2024-01-24 NOTE — PROGRESS NOTES
NEW DOAC   .  Anticoagulation Summary  As of 1/24/2024      INR goal:     TTR:  --   INR used for dosing:     Warfarin maintenance plan:  No maintenance plan   Next INR check:  2/7/2024   Target end date:  4/19/2024    Indications    Deep vein thrombosis (HCC) [I82.409]                 Anticoagulation Episode Summary       INR check location:      Preferred lab:      Send INR reminders to:      Comments:            Anticoagulation Patient Findings      PCP: Vanessa Carlson M.D.  Cardiologist: None  Dx: LLE DVT  CHADSVASC = N/a  HAS-BLED = N/a  Target End Date = 4/19/24 (3 months)    Pt Hx: Pt w/ hx of pelvic fracture d/t trauma back in 12/2023, underwent ORIF and Dc'd to rehab facility.    Pt was brought in to the ED on 1/19/24 from rehab facility d/t worsening LLE pain/swelling. Upon further w/u, an US was conducted - findings: Incompressible popliteal vein and in the calf an incompressible peroneal    vein with acute appearing occlusive thrombus demonstrated in both. Pt was subsequently started on Xarelto and discharged.     No noted personal hx of VTE. Pt's mother is on warfarin - pt unsure as to diagnosis indicating anticoagulation.    Labs:  Lab Results   Component Value Date/Time    WBC 11.1 (H) 12/25/2023 05:41 PM    RBC 4.66 12/25/2023 05:41 PM    HEMOGLOBIN 11.3 (L) 12/25/2023 05:41 PM    HEMATOCRIT 36.9 (L) 12/25/2023 05:41 PM    MCV 79.2 (L) 12/25/2023 05:41 PM    MCH 24.2 (L) 12/25/2023 05:41 PM    MCHC 30.6 (L) 12/25/2023 05:41 PM    MPV 8.9 (L) 12/25/2023 05:41 PM    NEUTSPOLYS 85.30 (H) 04/21/2021 03:34 PM    LYMPHOCYTES 11.10 (L) 04/21/2021 03:34 PM    MONOCYTES 2.10 04/21/2021 03:34 PM    EOSINOPHILS 0.70 04/21/2021 03:34 PM    BASOPHILS 0.40 04/21/2021 03:34 PM    HYPOCHROMIA 1+ 11/09/2012 03:35 AM      Lab Results   Component Value Date/Time    SODIUM 139 12/25/2023 05:41 PM    POTASSIUM 4.3 12/25/2023 05:41 PM    CHLORIDE 107 12/25/2023 05:41 PM    CO2 22 12/25/2023 05:41 PM    GLUCOSE 112  (H) 12/25/2023 05:41 PM    BUN 15 12/25/2023 05:41 PM    CREATININE 0.63 12/25/2023 05:41 PM          Pt is new to Xarelto and new to [facility]. Discussed:   Indication for DOAC therapy.  Importance of monitoring and compliance.   Monitoring parameters, signs and symptoms of bleeding or clotting.  DOAC therapy, side effects, potential DDIs, OTC medications  Hormonal therapy - None  Pregnancy - D/w pt  DDI - celecoxib increases risk of GIB = d/w pt, she will talk to her provider to see if she can DC.   Pt is NOT on antiplatelet/NSAID therapy.  Lifestyle safety, ie smoking, ETOH, hobby safety, fall safety/prevention  Procedures for missed doses or suspected missed doses, surgeries/procedures, travel, dental work, any medication changes    Start with Xarelto 15mg taken 2 times a day for 21 days and then change to 20mg taken once daily. Pt will transition to 20mg dose on 2/10/24    DOAC affordable = TBD - pt getting medication from rehab facility    Samples provided: No    Labs to be completed prior to next f/u - CBC, CMP    Of note, pt c/o worsening LLE pain/swelling recently. She does have some fluid seepage from the leg. Referred pt to the ED for further management/workup today to r/o new/worsening DVT. Pt states she will have physician at rehab facility examine her when she returns there after our appt today.    F/U - 2 weeks     Ivan Dale, PharmD, BCACP      Added [facility] to care team   CC: Dr. Bloch

## 2024-01-25 NOTE — PROGRESS NOTES
Initial anticoagulation clinic note and most recent ED note reviewed    Patient started on anticoagulation with Xarelto for popliteal DVT after pelvic fracture    Pending further recommendations from PCP, we will continue 3 months of oral anticoagulation.  After 3 months we can discontinue if patient is asymptomatic and fully ambulatory, assuming PCP in agreement    Michael Bloch, MD  Anticoagulation Clinic    Cc:  COOKIE Carlson

## 2024-01-25 NOTE — PROGRESS NOTES
ADDENDUM 1/25/24: Received call from [facility] that their pharmacy prefers Eliquis. Pt was already provided with Xarelto 15mg bid x 21 days. Sent maintenance dose of Eliquis 5mg bid to pharmacy.    Asked RX Coordinators to release RX to pharmacy    Heaven Bourgeois, PharmD

## 2024-01-26 ENCOUNTER — HOSPITAL ENCOUNTER (EMERGENCY)
Facility: MEDICAL CENTER | Age: 39
End: 2024-01-26
Attending: EMERGENCY MEDICINE
Payer: MEDICAID

## 2024-01-26 ENCOUNTER — APPOINTMENT (OUTPATIENT)
Dept: RADIOLOGY | Facility: MEDICAL CENTER | Age: 39
End: 2024-01-26
Attending: EMERGENCY MEDICINE
Payer: MEDICAID

## 2024-01-26 VITALS
DIASTOLIC BLOOD PRESSURE: 72 MMHG | HEIGHT: 68 IN | SYSTOLIC BLOOD PRESSURE: 121 MMHG | HEART RATE: 81 BPM | OXYGEN SATURATION: 94 % | WEIGHT: 250 LBS | BODY MASS INDEX: 37.89 KG/M2 | RESPIRATION RATE: 20 BRPM | TEMPERATURE: 98.7 F

## 2024-01-26 DIAGNOSIS — I82.402 DEEP VEIN THROMBOSIS (DVT) OF LEFT LOWER EXTREMITY, UNSPECIFIED CHRONICITY, UNSPECIFIED VEIN (HCC): ICD-10-CM

## 2024-01-26 LAB
ALBUMIN SERPL BCP-MCNC: 4.3 G/DL (ref 3.2–4.9)
ALBUMIN/GLOB SERPL: 1.3 G/DL
ALP SERPL-CCNC: 141 U/L (ref 30–99)
ALT SERPL-CCNC: 22 U/L (ref 2–50)
ANION GAP SERPL CALC-SCNC: 12 MMOL/L (ref 7–16)
AST SERPL-CCNC: 22 U/L (ref 12–45)
BASOPHILS # BLD AUTO: 0.3 % (ref 0–1.8)
BASOPHILS # BLD: 0.02 K/UL (ref 0–0.12)
BILIRUB SERPL-MCNC: 0.4 MG/DL (ref 0.1–1.5)
BUN SERPL-MCNC: 10 MG/DL (ref 8–22)
CALCIUM ALBUM COR SERPL-MCNC: 8.8 MG/DL (ref 8.5–10.5)
CALCIUM SERPL-MCNC: 9 MG/DL (ref 8.5–10.5)
CHLORIDE SERPL-SCNC: 103 MMOL/L (ref 96–112)
CO2 SERPL-SCNC: 27 MMOL/L (ref 20–33)
CREAT SERPL-MCNC: 0.61 MG/DL (ref 0.5–1.4)
EOSINOPHIL # BLD AUTO: 0.14 K/UL (ref 0–0.51)
EOSINOPHIL NFR BLD: 2.2 % (ref 0–6.9)
ERYTHROCYTE [DISTWIDTH] IN BLOOD BY AUTOMATED COUNT: 50.1 FL (ref 35.9–50)
GFR SERPLBLD CREATININE-BSD FMLA CKD-EPI: 116 ML/MIN/1.73 M 2
GLOBULIN SER CALC-MCNC: 3.3 G/DL (ref 1.9–3.5)
GLUCOSE SERPL-MCNC: 84 MG/DL (ref 65–99)
HCT VFR BLD AUTO: 39.5 % (ref 37–47)
HGB BLD-MCNC: 11.9 G/DL (ref 12–16)
IMM GRANULOCYTES # BLD AUTO: 0.03 K/UL (ref 0–0.11)
IMM GRANULOCYTES NFR BLD AUTO: 0.5 % (ref 0–0.9)
LYMPHOCYTES # BLD AUTO: 1.24 K/UL (ref 1–4.8)
LYMPHOCYTES NFR BLD: 19.6 % (ref 22–41)
MCH RBC QN AUTO: 24.1 PG (ref 27–33)
MCHC RBC AUTO-ENTMCNC: 30.1 G/DL (ref 32.2–35.5)
MCV RBC AUTO: 80.1 FL (ref 81.4–97.8)
MONOCYTES # BLD AUTO: 0.51 K/UL (ref 0–0.85)
MONOCYTES NFR BLD AUTO: 8.1 % (ref 0–13.4)
NEUTROPHILS # BLD AUTO: 4.38 K/UL (ref 1.82–7.42)
NEUTROPHILS NFR BLD: 69.3 % (ref 44–72)
NRBC # BLD AUTO: 0 K/UL
NRBC BLD-RTO: 0 /100 WBC (ref 0–0.2)
PLATELET # BLD AUTO: 300 K/UL (ref 164–446)
PMV BLD AUTO: 9.2 FL (ref 9–12.9)
POTASSIUM SERPL-SCNC: 3.7 MMOL/L (ref 3.6–5.5)
PROT SERPL-MCNC: 7.6 G/DL (ref 6–8.2)
RBC # BLD AUTO: 4.93 M/UL (ref 4.2–5.4)
SODIUM SERPL-SCNC: 142 MMOL/L (ref 135–145)
WBC # BLD AUTO: 6.3 K/UL (ref 4.8–10.8)

## 2024-01-26 PROCEDURE — 85025 COMPLETE CBC W/AUTO DIFF WBC: CPT

## 2024-01-26 PROCEDURE — 700102 HCHG RX REV CODE 250 W/ 637 OVERRIDE(OP): Mod: UD | Performed by: EMERGENCY MEDICINE

## 2024-01-26 PROCEDURE — A9270 NON-COVERED ITEM OR SERVICE: HCPCS | Mod: UD | Performed by: EMERGENCY MEDICINE

## 2024-01-26 PROCEDURE — 80053 COMPREHEN METABOLIC PANEL: CPT

## 2024-01-26 PROCEDURE — 36415 COLL VENOUS BLD VENIPUNCTURE: CPT

## 2024-01-26 PROCEDURE — 99285 EMERGENCY DEPT VISIT HI MDM: CPT

## 2024-01-26 PROCEDURE — 93971 EXTREMITY STUDY: CPT | Mod: LT

## 2024-01-26 RX ORDER — OXYCODONE HYDROCHLORIDE 5 MG/1
10 TABLET ORAL EVERY 4 HOURS PRN
Status: DISCONTINUED | OUTPATIENT
Start: 2024-01-26 | End: 2024-01-27 | Stop reason: HOSPADM

## 2024-01-26 RX ADMIN — OXYCODONE HYDROCHLORIDE 10 MG: 5 TABLET ORAL at 18:21

## 2024-01-26 ASSESSMENT — PAIN DESCRIPTION - PAIN TYPE
TYPE: ACUTE PAIN
TYPE: ACUTE PAIN

## 2024-01-26 ASSESSMENT — FIBROSIS 4 INDEX: FIB4 SCORE: 1.26

## 2024-01-27 NOTE — ED NOTES
Bedside report with JOLEEN Webster.  Pt connected to monitor, on room air. Call light within reach.

## 2024-01-27 NOTE — ED TRIAGE NOTES
"Chief Complaint   Patient presents with    Leg Swelling     BIBA from [facility]. Pt seen here a week ago for similar complaint.  Pt was diagnosed with DVT to left leg and hematoma to calf that was drained.  Pt states over the past couple of days pain and swelling have increased so pt sent here . Pt on Xarelto for DVT and states she is compliant.      /69   Pulse 71   Resp 20   Ht 1.727 m (5' 8\")   Wt 113 kg (250 lb)   SpO2 97%   BMI 38.01 kg/m²     Pt connected to the monitor. Large amount of swelling present to left leg with redness present. Pt rates pain at 5/10 when sitting in bed but pain shoots up with movement.   "

## 2024-01-27 NOTE — ED NOTES
Bedside report received from off going RN/tech: assigned RN, assumed care of patient.  POC discussed with patient. Call light within reach, all needs addressed at this time.       Fall risk interventions in place: Patient's personal possessions are with in their safe reach, Place socks on patient, Place fall risk sign on patient's door, Give patient urinal if applicable, Keep floor surfaces clean and dry, and Accompanied to restroom (all applicable per [place] Fall risk assessment)   Continuous monitoring: Pulse Ox or Blood Pressure  IVF/IV medications: Not Applicable   Oxygen: Room Air  Bedside sitter: Not Applicable   Isolation: Not Applicable    Ultrasound at bedside

## 2024-01-27 NOTE — ED NOTES
Report provided to Abigail for cont of care. Erik [facility] accepted reports.   Transport via [facility] at bedside.

## 2024-01-27 NOTE — ED PROVIDER NOTES
ED Provider Note    CHIEF COMPLAINT  Chief Complaint   Patient presents with    Leg Swelling     BIBA from [facility]. Pt seen here a week ago for similar complaint.  Pt was diagnosed with DVT to left leg and hematoma to calf that was drained.  Pt states over the past couple of days pain and swelling have increased so pt sent here . Pt on Xarelto for DVT and states she is compliant.        EXTERNAL RECORDS REVIEWED  Outpatient Notes trauma, vascular    HPI/ROS  LIMITATION TO HISTORY   None  OUTSIDE HISTORIAN(S):  None    Vanessa Crenshaw is a 38 y.o. female who presents here for evaluation of continued and persistent left lower leg swelling.  Patient states she was seen and evaluated a week ago, for DVT.  She was noted to have a DVT and started on Xarelto.  She states that she is compliant.  Patient states she still has persistent edema, and some mild warmth to the leg.  She has no fever chills or vomiting, no chest pain or shortness of breath, no headache or vomiting.  Patient is here because they were recommended she get repeat ultrasound.  At [facility] nursing [facility], denies any fall or trauma.    PAST MEDICAL HISTORY   has a past medical history of Asthma, Dental disorder, and Psychiatric problem.    SURGICAL HISTORY   has a past surgical history that includes hysteroscopy with myosure (4/21/2021); dilation and curettage (4/21/2021); abdominal exploration; and percut fix prox/neck femur fx (N/A, 12/28/2023).    FAMILY HISTORY  Family History   Problem Relation Age of Onset    Diabetes Mother     Hypertension Father     Heart Disease Father        SOCIAL HISTORY  Social History     Tobacco Use    Smoking status: Every Day     Current packs/day: 1.00     Types: Cigarettes    Smokeless tobacco: Never   Vaping Use    Vaping Use: Never used   Substance and Sexual Activity    Alcohol use: Yes     Comment: 6 week    Drug use: Yes     Types: Inhaled     Comment: meth hx, marajuana occassionally on Sunday 4/18     "Sexual activity: Yes       CURRENT MEDICATIONS  Home Medications       Reviewed by Nasrin Caldera R.N. (Registered Nurse) on 01/26/24 at 1716  Med List Status: Not Addressed     Medication Last Dose Status   acetaminophen (TYLENOL) 500 MG Tab  Active   albuterol 108 (90 Base) MCG/ACT Aero Soln inhalation aerosol  Active   apixaban (ELIQUIS) 5mg Tab  Active   celecoxib (CELEBREX) 100 MG Cap  Active   Cyanocobalamin (VITAMIN B-12 PO)  Active   fluticasone (FLONASE) 50 MCG/ACT nasal spray  Active   loratadine (CLARITIN) 10 MG Tab  Active   metaxalone (SKELAXIN) 800 MG Tab  Active   potassium chloride (KLOR-CON) 20 MEQ Pack  Active   pseudoephedrine (SUDAFED) 30 MG Tab  Active   senna-docusate (PERICOLACE OR SENOKOT S) 8.6-50 MG Tab  Active   tamsulosin (FLOMAX) 0.4 MG capsule  Active                    ALLERGIES  Allergies   Allergen Reactions    Molds & Smuts      Pollens and molds; seasonal allergies       PHYSICAL EXAM  VITAL SIGNS: /69   Pulse 71   Temp 36.6 °C (97.9 °F) (Temporal)   Resp 20   Ht 1.727 m (5' 8\")   Wt 113 kg (250 lb)   SpO2 97%   BMI 38.01 kg/m²    Constitutional: Well developed, well nourished. No acute distress.  HEENT: Normocephalic, atraumatic. Posterior pharynx clear and moist.  Eyes:  EOMI. Normal sclera.  Neck: Supple, Full range of motion, nontender.  Chest/Pulmonary: clear to ausculation. Symmetrical expansion.   Cardio: Regular rate and rhythm with no murmur  Musculoskeletal: No deformity, no edema, neurovascular intact.  Left lower extremity; tenderness noted to the lateral thigh and calf.  DPP present.  Remaining extremities are nontender to palpation.  Neuro: Clear speech, appropriate, cooperative, cranial nerves II-XII grossly intact.  Psych: Normal mood and affect      DIAGNOSTIC STUDIES / PROCEDURES  Results for orders placed or performed during the hospital encounter of 01/26/24   CBC WITH DIFFERENTIAL   Result Value Ref Range    WBC 6.3 4.8 - 10.8 K/uL    RBC 4.93 " 4.20 - 5.40 M/uL    Hemoglobin 11.9 (L) 12.0 - 16.0 g/dL    Hematocrit 39.5 37.0 - 47.0 %    MCV 80.1 (L) 81.4 - 97.8 fL    MCH 24.1 (L) 27.0 - 33.0 pg    MCHC 30.1 (L) 32.2 - 35.5 g/dL    RDW 50.1 (H) 35.9 - 50.0 fL    Platelet Count 300 164 - 446 K/uL    MPV 9.2 9.0 - 12.9 fL    Neutrophils-Polys 69.30 44.00 - 72.00 %    Lymphocytes 19.60 (L) 22.00 - 41.00 %    Monocytes 8.10 0.00 - 13.40 %    Eosinophils 2.20 0.00 - 6.90 %    Basophils 0.30 0.00 - 1.80 %    Immature Granulocytes 0.50 0.00 - 0.90 %    Nucleated RBC 0.00 0.00 - 0.20 /100 WBC    Neutrophils (Absolute) 4.38 1.82 - 7.42 K/uL    Lymphs (Absolute) 1.24 1.00 - 4.80 K/uL    Monos (Absolute) 0.51 0.00 - 0.85 K/uL    Eos (Absolute) 0.14 0.00 - 0.51 K/uL    Baso (Absolute) 0.02 0.00 - 0.12 K/uL    Immature Granulocytes (abs) 0.03 0.00 - 0.11 K/uL    NRBC (Absolute) 0.00 K/uL   Comp Metabolic Panel   Result Value Ref Range    Sodium 142 135 - 145 mmol/L    Potassium 3.7 3.6 - 5.5 mmol/L    Chloride 103 96 - 112 mmol/L    Co2 27 20 - 33 mmol/L    Anion Gap 12.0 7.0 - 16.0    Glucose 84 65 - 99 mg/dL    Bun 10 8 - 22 mg/dL    Creatinine 0.61 0.50 - 1.40 mg/dL    Calcium 9.0 8.5 - 10.5 mg/dL    Correct Calcium 8.8 8.5 - 10.5 mg/dL    AST(SGOT) 22 12 - 45 U/L    ALT(SGPT) 22 2 - 50 U/L    Alkaline Phosphatase 141 (H) 30 - 99 U/L    Total Bilirubin 0.4 0.1 - 1.5 mg/dL    Albumin 4.3 3.2 - 4.9 g/dL    Total Protein 7.6 6.0 - 8.2 g/dL    Globulin 3.3 1.9 - 3.5 g/dL    A-G Ratio 1.3 g/dL   ESTIMATED GFR   Result Value Ref Range    GFR (CKD-EPI) 116 >60 mL/min/1.73 m 2        RADIOLOGY  I have independently interpreted the diagnostic imaging associated with this visit and am waiting the final reading from the radiologist.   My preliminary interpretation is as follows: See below  Radiologist interpretation:   US-EXTREMITY VENOUS LOWER UNILAT LEFT         Ultrasound shows no acute changes from previous ultrasound done.      COURSE & MEDICAL DECISION MAKING    She will  be discharged back to the skilled nursing facility.    INITIAL ASSESSMENT, COURSE AND PLAN  Care Narrative: This is a 38-year-old female here for evaluation of left lower extremity warmth.  It is noted on her blood work that she has a normal white count, she has no fever, and she has a repeat ultrasound that shows the same DVT with occlusion that was noted on the previous 1.  Patient has distal pulses, she is already on Xarelto, she has no shortness of breath, no chest pain, and is comfortable.  Antibiotics are not indicated at this time.    DISPOSITION AND DISCUSSIONS  I have discussed management of the patient with the following physicians and FACUNDO's: None    Discussion of management with other QHP or appropriate source(s): None    Escalation of care considered, and ultimately not performed: IV fluids not indicated as patient has no hypovolemia    Barriers to care at this time, including but not limited to: Patient does not have established PCP.     Decision tools and prescription drugs considered including, but not limited to: None.    FINAL DIAGNOSIS  DVT       Electronically signed by: Anirudh Foote D.O., 1/26/2024 6:26 PM

## 2024-01-27 NOTE — DISCHARGE PLANNING
Medical Social Work     SW received a call from the RN requesting SW assistance with [facility] transport back to [facility]. SW called [facility] and spoke with staff from [address] and they took our phone number and information and stated they will give it to the pt RN in [address]. JAIMIE called T and set up transport for 2330. Transfer packet complete and given to the RN.     Reservation # [id number]    Transport time: 2330    Cost: $137.76

## 2024-01-27 NOTE — ED NOTES
Pt provided w/ meal and refreshment. Pt is aaox4. ED MD has reassessed pt at bedside. Pt is requesting transportation to Rehab center due to inability to bear weight to left lower extremity. Will contact JAIMIE

## 2024-02-08 ENCOUNTER — ANTICOAGULATION VISIT (OUTPATIENT)
Dept: VASCULAR LAB | Facility: MEDICAL CENTER | Age: 39
End: 2024-02-08
Attending: INTERNAL MEDICINE
Payer: MEDICAID

## 2024-02-08 DIAGNOSIS — I82.432 ACUTE DEEP VEIN THROMBOSIS (DVT) OF POPLITEAL VEIN OF LEFT LOWER EXTREMITY (HCC): ICD-10-CM

## 2024-02-08 PROCEDURE — 99212 OFFICE O/P EST SF 10 MIN: CPT | Performed by: NURSE PRACTITIONER

## 2024-02-08 NOTE — PROGRESS NOTES
"Diagnosis: LLE popliteal DVT after trauma  Drug: Xarelto 15 mg BID then Eliquis 5 mg BID (due to insurance)  LOT: 3 mo then re-eval  [facility]  HAS-BLED: 0    Health Status Since Last Assessment  She went to the ER on 1/26/24 for LLE swelling and pain. Repeat scan essentially unchanged and showed \"Re-demonstration of acute to subacute, partially occlusive thrombus in the left popliteal vein and peroneal veins.\" She also has a large area of edema to left calf area. This hhas not worsened but has not improved. She had area of swelling drained in the ER on 1/19/24 in which 130 cc of serosanguineous fluid was removed. Was considered to be a \"large hypoechoic fluid collection\" rather than hematoma. Occurred after a leg trauma on 12/25/23 in which a log rolled over her leg. States the swelling is worse at night when laying down and better during the daytime. Still has pain to her left leg. Skin shiny at times. No redness or excessive warmth.    She is currently at [facility]. Using a wheelchair. Still NWB but sees RODGER on Monday. Taking Xarelto 15 mg BID as instructed without missed doses. She will be transitioning to Eliquis as it is preferred over Xarelto with her insurance and at the [facility] pharmacy.    She had a repeat LLE venous duplex at [facility] yesterday. She will bring the results to her next appt.    Adherence with DOAC Therapy  0 missed dose(s)  BLEEDING RISK ASSESSMENT NB:    Bleeding Risk Assessment  Severe epistaxis - no   Hemoptysis - no  Excessive or unusual bruising / hematomas - yes, see above  GIB/melena/BRBPR/hematemesis - no  Hematuria - no   Abnormal vaginal bleeding - no  Concerning daily headache or subdural hematoma symptoms - no  Decreasing hemoglobin or new anemia - 11.9 (trending low but improved)  Falls, presyncope, syncope, or seizures - no  Platelets: 300  Latest hemoglobin:     Lab Results   Component Value Date/Time    WBC 6.3 01/26/2024 06:33 PM    RBC 4.93 01/26/2024 06:33 PM    " HEMOGLOBIN 11.9 (L) 01/26/2024 06:33 PM    HEMATOCRIT 39.5 01/26/2024 06:33 PM    MCV 80.1 (L) 01/26/2024 06:33 PM    MCH 24.1 (L) 01/26/2024 06:33 PM    MCHC 30.1 (L) 01/26/2024 06:33 PM    MPV 9.2 01/26/2024 06:33 PM    NEUTSPOLYS 69.30 01/26/2024 06:33 PM    LYMPHOCYTES 19.60 (L) 01/26/2024 06:33 PM    MONOCYTES 8.10 01/26/2024 06:33 PM    EOSINOPHILS 2.20 01/26/2024 06:33 PM    BASOPHILS 0.30 01/26/2024 06:33 PM    HYPOCHROMIA 1+ 11/09/2012 03:35 AM        HAS-BLED:  Hypertension (uncontrolled, >160 mmHg systolic) - no  Renal disease (dialysis, transplant, Cr >2.26 mg/dL or >200 µmol/L) - no  Liver disease (cirrhosis or bilirubin >2x normal with AST/ALT/AP >3x normal) - no  Stroke history - no  Prior major bleeding or predisposition to bleeding - no  Labile INR Unstable/high INRs, time in therapeutic range <60% - no  Age >65 - no  Medication usage predisposing to bleeding (aspirin, clopidogrel, NSAIDs) - no  Alcohol use  ?8 drinks/week - no      Creatinine Clearance/Renal Function  Latest eGFR / creatinine:  Lab Results   Component Value Date/Time    SODIUM 142 01/26/2024 06:33 PM    POTASSIUM 3.7 01/26/2024 06:33 PM    CHLORIDE 103 01/26/2024 06:33 PM    CO2 27 01/26/2024 06:33 PM    GLUCOSE 84 01/26/2024 06:33 PM    BUN 10 01/26/2024 06:33 PM    CREATININE 0.61 01/26/2024 06:33 PM       Is eGFR less than 50ml/min  no  Cr cl > 50 ml/min    If YES, calculate CrCl (see back)  Any recent dehydrating illness or medications added/changed? i.e. Diuretics      Drug Interactions  ASA/antiplatelets - avoids  NSAID - celecoxib can increase her risk of bleeding. Acetaminophen recommended over NSAID  Other drug interactions - see above  (Review med list / OTCs;)  Current Outpatient Medications on File Prior to Visit   Medication Sig Dispense Refill    apixaban (ELIQUIS) 5mg Tab Take 1 Tablet by mouth 2 times a day. 60 Tablet 5    potassium chloride (KLOR-CON) 20 MEQ Pack       acetaminophen (TYLENOL) 500 MG Tab Take 2  Tablets by mouth every 6 hours as needed for Mild Pain. 30 Tablet 0    celecoxib (CELEBREX) 100 MG Cap Take 1 Capsule by mouth 2 times a day. 60 Capsule 0    metaxalone (SKELAXIN) 800 MG Tab Take 1 Tablet by mouth 3 times a day. 90 Tablet 0    senna-docusate (PERICOLACE OR SENOKOT S) 8.6-50 MG Tab Take 2 Tablets by mouth 2 times a day. 30 Tablet 0    tamsulosin (FLOMAX) 0.4 MG capsule Take 1 Capsule by mouth 1/2 hour after breakfast. 30 Capsule 0    albuterol 108 (90 Base) MCG/ACT Aero Soln inhalation aerosol Inhale 2 Puffs every four hours as needed for Shortness of Breath.      loratadine (CLARITIN) 10 MG Tab Take 1 Tablet by mouth 1 time a day as needed (Allergies).      pseudoephedrine (SUDAFED) 30 MG Tab Take 60 mg by mouth every four hours as needed for Congestion. 2 tablets = 60 mg. Not to exceed 8 tablets per 24 hours.      fluticasone (FLONASE) 50 MCG/ACT nasal spray Administer 2 Sprays into affected nostril(S) 1 time a day as needed (Allergies).      Cyanocobalamin (VITAMIN B-12 PO) Take 1 Tablet by mouth every day.       No current facility-administered medications on file prior to visit.     Verified no anticonvulsant or azole therapy, education provided for future use.      Examination  Hematoma to left lateral calf. Full to touch. Mildly tender to palpation. Measures ~15 cm in length, 16 cm in width.            Final Assessment and Recommendations:  Patient appears stable from the anticoagulation standpoint  Benefits of continued DOAC therapy outweigh risks for this patient  Will have pt finish her 21 day course of Xarelto 15 mg with food then began taking Eliquis 5 mg BID.  Confirmed she has no problems with copay.  Regarding her area of left calf swelling, we discussed the option of going to the ER, however, she reports it has not necessarily worsened since her last ER visit. With shared decision-making,  I will have pt return on Monday to reevaluate. She sees ortho on Monday. Instructed pt to go to  the ER if the swelling or pain worsens prior to her next visit. She verbalizes understanding. She is asking if the DVT and hematoma are related and I explained I do not think they are related but possibly both caused by her leg trauma in Dec.    - Finish your current supply of Xarelto 15 mg by mouth twice daily with food THEN began taking Eliquis 5 mg by mouth twice daily    - go to the ER for shortness of breath, chest pain, pain with deep inhalation, worsening leg swelling and/or pain in calf or leg   - avoid sedentary periods  - let all your providers know you take this medication  - don't stop this medication without permission from your doctor or our clinic  - avoid Aspirin and anti-inflammatories (eg. Advil, ibuprofen, Aleve, naproxen, etc) while anticoagulated   - elevate legs as much as possible, use compression stockings/socks if directed by your provider  - if any bleeding lasting 30min without stopping, please seek care with your PCP, urgent care, or ED  - if having any invasive procedure, please make sure the doctor knows of your history of blood clots and current anticoagulation status      Follow up:  Will follow up with patient on Monday    Yolanda SEGURA    Cc:  [facility]  Dr Garcia

## 2024-02-08 NOTE — PROGRESS NOTES
[facility]    Attention [place]:    Finish your current supply of Xarelto 15 mg by mouth twice daily with food THEN began taking Eliquis 5 mg by mouth twice daily     Next appointment: Monday, February 12, 2024 at 2:45 pm.    Yolanda SEGURA  [facility]  [phone]

## 2024-02-12 ENCOUNTER — APPOINTMENT (OUTPATIENT)
Dept: VASCULAR LAB | Facility: MEDICAL CENTER | Age: 39
End: 2024-02-12
Attending: INTERNAL MEDICINE
Payer: MEDICAID

## 2024-03-11 ENCOUNTER — PHYSICAL THERAPY (OUTPATIENT)
Dept: PHYSICAL THERAPY | Facility: MEDICAL CENTER | Age: 39
End: 2024-03-11
Attending: INTERNAL MEDICINE
Payer: MEDICAID

## 2024-03-11 DIAGNOSIS — S32.89XS: ICD-10-CM

## 2024-03-11 PROCEDURE — 97162 PT EVAL MOD COMPLEX 30 MIN: CPT

## 2024-03-11 ASSESSMENT — ENCOUNTER SYMPTOMS
PAIN SCALE AT LOWEST: 0
PAIN SCALE AT HIGHEST: 6
QUALITY: ACHING
PAIN SCALE: 0

## 2024-03-11 NOTE — OP THERAPY EVALUATION
"  Outpatient Physical Therapy  INITIAL EVALUATION    [facility]  [address]  [facility] [address]  Phone:  [phone]  Fax:  [phone]    Date of Evaluation: 03/11/2024    Patient: Vanessa Crenshaw  YOB: 1985  MRN: [id number]     Referring Provider: Vanessa Carlson M.D.  [address] [place] [id number]   Referring Diagnosis Fracture of other parts of pelvis, sequela [S32.89XS]     Time Calculation  Start time: 0817  Stop time: 0859 Time Calculation (min): 42 minutes         Chief Complaint: Knee Problem    Visit Diagnoses     ICD-10-CM   1. Fracture of other parts of pelvis, sequela  S32.89XS       Date of onset of impairment: No data found    Subjective:   History of Present Illness:     Mechanism of injury:  Patient is a 38 year old female with a PMH including: Rib fractures and pulmonary contusion, traumatic cephalohematoma, t/s fracture, asthma, anxiety, dilation and curettage (2021). Pt reporting has had chronic R knee issues from 2015 with soreness in the knee. No clear CT in the past. She was seen in PT in the past and is known to this provider.     Pt presenting to therapy today to address pelvic fracture due to tree falling on patient in [place]. Per surgical note on 12/28/23, pt had \"pelvic ring injury that occurred after a tree fell on her l.  The patient denies antecedent pain, and was found to have a normal neurovascular exam and skin envelope.  Radiographs reviewed by myself demonstrated the zone 3 sacral fracture.  She attempted mobilization and nonoperative care with physical therapy which was unsuccessful.  Given these findings, surgical treatment of the zone 3 sacral fracture with percutaneous screws was indicated.\" She was in a rehab facility with PT and overall care complicated with DVT and hematoma at [facility].   Pt present today stating her L knee popped out of place. Pt was released on 2/19 to " her home. She presents today with walker from rehab; she was in a wheelchair. She is on blood thinners; she will follow up for repeat US in April. Pt instructed to utilize walker and WBAT. Pt stating she will be following up with [facility]ODGER and pain specialist on Friday. Has not had any falls. Denies numbness/tingling.       Prior level of function:  Indep at the gym, walking on dog, indep with ADL's  Sleep disturbance:  Interrupted sleep (sitting before bed grossly 1 hour)  Pain:     Current pain ratin    At best pain ratin    At worst pain ratin    Location:  Lower back and deep groin    Quality:  Aching    Progression:  Improving    Pain Comments::  Aggravating/difficult ADL's: stairs, sleeping in recliner -tried sleeping in normal bed, walk the dog, going to the gym  Social Support:     Lives in:  Apartment (stairs)  Treatments:     Treatment Comments:  Surgery   Pain medications  Blood thinners for DVT  Activities of Daily Living:     Patient reported ADL status: Patient's current daily routine includes:  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       Patient Goals:     Patient goals for therapy:  [place] with ADLs/IADLs    Other patient goals:  Walking dog, stair training, returning to the gym      Past Medical History:   Diagnosis Date    Asthma     Dental disorder     upper denture    Psychiatric problem     anxiety     Past Surgical History:   Procedure Laterality Date    PB PERCUT FIX PBOX/NECK FEMUR FX N/A 2023    Procedure: FIXATION, HIP, USING CANNULATED SCREW - SACROILIAC SCREW PELVIS;  Surgeon: Ernst Sarmiento M.D.;  Location: SURGERY [facility];  Service: Orthopedics    HYSTEROSCOPY WITH MYOSURE  2021    Procedure: HYSTEROSCOPY;  Surgeon: Perla Rehman  "M.D.;  Location: SURGERY SAME DAY [facility];  Service: Gynecology    DILATION AND CURETTAGE  4/21/2021    Procedure: DILATION AND CURETTAGE.;  Surgeon: Perla Rehman M.D.;  Location: SURGERY SAME DAY [facility];  Service: Gynecology    ABDOMINAL EXPLORATION       Social History     Tobacco Use    Smoking status: Every Day     Current packs/day: 1.00     Types: Cigarettes    Smokeless tobacco: Never   Substance Use Topics    Alcohol use: Yes     Comment: 6 week     Family and Occupational History     Socioeconomic History    Marital status: Single     Spouse name: Not on file    Number of children: Not on file    Years of education: Not on file    Highest education level: Not on file   Occupational History    Not on file       Objective     Postural Observations  Seated posture: poor    Additional Postural Observation Details  Forward head and rounded shoulders    Neurological Testing     Additional Neurological Details  Burning \"deep sunburn\" at lateral LLE in L5/S1 distribution     Palpation     Additional Palpation Details  TTP L lateral thigh     Active Range of Motion     Lumbar   Flexion: decreased  Extension: decreased  Left lateral flexion: decreased  Right lateral flexion: decreased    Tests       Lumbar spine (left)      Negative slump.   Lumbar spine (right)     Negative slump.     Additional Tests Details  Difficulty attaining slump position on L    General Comments     Spine Comments   Gait: Step to pattern with hyperextended knees; shuffling gait; forward trunk lean; utilizes FWW.     Transfer: cautious with sit to stand; utilizes FWW with erect posture.   -Pt able to transfer on/off treadmill without assistance indep.    Bed mobility: lying>sit with visual facial grimacing and increased time. Difficulty with scooting to EOB.          Therapeutic Exercises (CPT 00152):     1. pt education, re: desensitizing program for LLE, hep    2. sciatic nerve glides, x10 LLE, hep    3. sit to stands with FWW, x10 " with slow eccentrics, hep    4. pt education, re: TM with level 1 incline for ankles; slow pace and UE strengthening if ok with surgeon    5. bed mobility, log roll technique    6. pt education, re: pillow under knees if supine lying to decr tension at l/s, hourly short walks with FWW    7. standing l/s extensions with FWW, x10, hep    20. exercises at no additional charge today      Therapeutic Exercise Summary: Access Code: [email or web address]  URL: [email or web address]  Date: 03/11/2024  Prepared by: Kelvin Isaac    Exercises  - Sit to Stand  - 2 x daily - 7 x weekly - 1 sets - 10 reps  - Standing Lumbar Extension  - 2-3 x daily - 7 x weekly - 1 sets - 10 reps  [facility]    Pt performed these exercises with instruction and SPV.  Provided handout with these exercises for daily HEP.          Time-based treatments/modalities:           Assessment, Response and Plan:   Impairments: abnormal gait, abnormal or restricted ROM, activity intolerance, hypersensitivity, impaired physical strength and lacks appropriate home exercise program    Assessment details:  Patient is a pleasant and cooperative 38 year old female who is known to this provider. Pt presenting today following pelvic fx, LLE hematoma due to crush injury from tree in Dec 2023. She was seen for surgical management (left sacroiliac joint fixation) at pelvis on 12/28/23. Followed up with rehab; complications due to LLE DVT. She is currently amb WBAT B with FWW Exam findings suggestive of poor bed mobility and transfers, abnormal gait, weakness at core and prox pelvic girdle. Negative slump testing but uncertain whether lateral LLE discomfort is referring from l/s due to recent incr in sitting or from injury alone; will continue to monitor in sessions. Pt may benefit from skilled physical therapy in order to address above impairments in order to improve QOL and return to reported ADL's.     Goals:   Short Term Goals:   1. Pt will be  independent with written HEP.  2. Pt will demonstrate pain-free transfers.      Short term goal time span:  4-6 weeks      Long Term Goals:    1. Pt will be independent with written HEP.  2. Pt a sig improvement in WOMAC score (eval: 63.54).  3. Pt will demonstrate normal gait pattern without AD on level terrain.  4. Pt will be able to ascend/descend one flight of stairs with 1 HR and no AD without incr in s/s.   5. Pt will be able to return to gym at least 3x/wk with strengthening routine with min modifications if any to improve QOL.      Long term goal time span:  2-4 months    Plan:   Therapy options:  Physical therapy treatment to continue  Planned therapy interventions:  Neuromuscular Re-education (CPT 16896) and Therapeutic Exercise (CPT 54970)  Frequency: 1-2x/wk.  Duration in visits:  12  Discussed with:  Patient  Plan details:  UPOC: 5/31/24    [id number] and 1x97112 per visit     *POC extended to allot for lag in auth time for insurance      Functional Assessment Used  WOMAC Grand Total: 63.54     Referring provider co-signature:  I have reviewed this plan of care and my co-signature certifies the need for services.    Certification Period: 03/11/2024 to  5/31/24    Physician Signature: ________________________________ Date: ______________

## 2024-03-28 NOTE — PROGRESS NOTES
VASCULAR MEDICINE CLINIC - INITIAL VISIT (ANTICOAGULATION)  4/1/24     Vanessa Crenshaw is a 38 y.o. female who presents today for LOT.     Subjective    HPI:  Patient referred for evaluation and management of anticoagulation therapy.  She was seen in the ER on 1/19/24 for LLE swelling.  U/s showed a LLE popliteal DVT.  Also noted to have a large hypoechoic fluid collection to her left calf which was aspirated.  No prior hx of VTEs.  No known FH.  In Dec she had a trauma in which a log rolled over her left leg.  Was hospitalized 12/25/23-1/2/24 for pelvic fx for which she had ORIF surgery 12/28/23 and was then transferred to rehab.  Went back to the ER on 1/26/24 for persistent LLE swelling. F/u scan showed no acute changes from previous study.  Denies any prior surgeries, hospitalizations, medical illnesses, traumas, extended travel or other prolonged periods of immobility.  No recent covid infections ***  No tobacco or hormone use ***  Denies any personal hx of malignancy ***  Last mammo ***  Last pap smear ***  Last colonoscopy ***  Last PSA ***  Started on Xarelto 15 mg BID x 21 days then transitioned to Eliquis 5 mg BID due to preferred by [facility] pharmacy.   Has been adherent to taking.  Cost is affordable ***  No problems with bleeding ***  Avoids NSAIDs/ASA ***  ETOH ***  No SOB or CP ***  No LE swelling or pain ***  Resumed usual activities ***  Sedentary ***  Exercise ***  BMI 38.  Has completed 3 mo of therapy.    Past Medical History:   Diagnosis Date    Asthma     Dental disorder     upper denture    Psychiatric problem     anxiety        Past Surgical History:   Procedure Laterality Date    PB PERCUT FIX PBOX/NECK FEMUR FX N/A 12/28/2023    Procedure: FIXATION, HIP, USING CANNULATED SCREW - SACROILIAC SCREW PELVIS;  Surgeon: Ernst Sarmiento M.D.;  Location: SUR[facility]ERY [facility];  Service: Orthopedics    HYSTEROSCOPY WITH MYOSURE  4/21/2021    Procedure: HYSTEROSCOPY;  Surgeon: Perla  WALT Rehman;  Location: SURGERY SAME DAY [facility];  Service: Gynecology    DILATION AND CURETTAGE  4/21/2021    Procedure: DILATION AND CURETTAGE.;  Surgeon: Perla Rehman M.D.;  Location: SURGERY SAME DAY [facility];  Service: Gynecology    ABDOMINAL EXPLORATION          Family History   Problem Relation Age of Onset    Diabetes Mother     Hypertension Father     Heart Disease Father         Social History     Tobacco Use    Smoking status: Every Day     Current packs/day: 1.00     Types: Cigarettes    Smokeless tobacco: Never   Vaping Use    Vaping Use: Never used   Substance Use Topics    Alcohol use: Yes     Comment: 6 week    Drug use: Yes     Types: Inhaled     Comment: meth hx, marajuana occassionally on Sunday 4/18        Current Outpatient Medications on File Prior to Visit   Medication Sig Dispense Refill    apixaban (ELIQUIS) 5mg Tab Take 1 Tablet by mouth 2 times a day. 60 Tablet 5    potassium chloride (KLOR-CON) 20 MEQ Pack       acetaminophen (TYLENOL) 500 MG Tab Take 2 Tablets by mouth every 6 hours as needed for Mild Pain. 30 Tablet 0    celecoxib (CELEBREX) 100 MG Cap Take 1 Capsule by mouth 2 times a day. 60 Capsule 0    metaxalone (SKELAXIN) 800 MG Tab Take 1 Tablet by mouth 3 times a day. 90 Tablet 0    senna-docusate (PERICOLACE OR SENOKOT S) 8.6-50 MG Tab Take 2 Tablets by mouth 2 times a day. 30 Tablet 0    tamsulosin (FLOMAX) 0.4 MG capsule Take 1 Capsule by mouth 1/2 hour after breakfast. 30 Capsule 0    albuterol 108 (90 Base) MCG/ACT Aero Soln inhalation aerosol Inhale 2 Puffs every four hours as needed for Shortness of Breath.      loratadine (CLARITIN) 10 MG Tab Take 1 Tablet by mouth 1 time a day as needed (Allergies).      pseudoephedrine (SUDAFED) 30 MG Tab Take 60 mg by mouth every four hours as needed for Congestion. 2 tablets = 60 mg. Not to exceed 8 tablets per 24 hours.      fluticasone (FLONASE) 50 MCG/ACT nasal spray Administer 2 Sprays into affected nostril(S) 1 time  "a day as needed (Allergies).      Cyanocobalamin (VITAMIN B-12 PO) Take 1 Tablet by mouth every day.       No current facility-administered medications on file prior to visit.        Molds & smuts and Seasonal     DIET AND EXERCISE:  Weight Change:***  Diet: {DIET TYPES:53234}  Exercise: {EXERCISE PATTERN:21}     ROS         Objective       Objective:     There were no vitals filed for this visit.     Physical Exam     DATA REVIEW    No results found for: \"CHOLSTRLTOT\", \"LDL\", \"HDL\", \"TRIGLYCERIDE\"    Lab Results   Component Value Date/Time    SODIUM 142 01/26/2024 06:33 PM    POTASSIUM 3.7 01/26/2024 06:33 PM    CHLORIDE 103 01/26/2024 06:33 PM    CO2 27 01/26/2024 06:33 PM    GLUCOSE 84 01/26/2024 06:33 PM    BUN 10 01/26/2024 06:33 PM    CREATININE 0.61 01/26/2024 06:33 PM     Lab Results   Component Value Date/Time    ALKPHOSPHAT 141 (H) 01/26/2024 06:33 PM    ASTSGOT 22 01/26/2024 06:33 PM    ALTSGPT 22 01/26/2024 06:33 PM    TBILIRUBIN 0.4 01/26/2024 06:33 PM       INR   Date Value Ref Range Status   12/25/2023 1.04 0.87 - 1.13 Final     Comment:     INR - Non-therapeutic Reference Range: 0.87-1.13  INR - Therapeutic Reference Range: 2.0-4.0       No results found for: \"POCINR\"     1/2/24 LLE venous duplex:  Normal left lower extremity superficial and deep venous examination.     1/19/24 LLE venous duplex:  Acute DVT in the popliteal and peroneal veins     1/26/24 LLE venous duplex:   Compared to the prior study on 1/19/24.      Redemonstration of acute to subacute, partially occlusive thrombus in the left popliteal vein and peroneal veins.    Medical Decision Making:  Today's Assessment / Status / Plan:     No diagnosis found.     Indication for anticoagulation: LLE popliteal DVT after pelvic fx    Anti-Platelet/Anticoagulant Discussion:  Long discussion regarding the risks and benefits of stopping OAC in this setting. Provoking factors for DVT include leg trauma, pelvic fx, subsequent reduce mobility. She " has completed a minimum 3 months of therapy per ACCP guidelines. She is fully ambulatory *** and hs resumed her normal activities ***. Known ongoing risk factors for VTE is obesity. We discussed the option of repeat imaging but she has no s/sx of ongoing, acute thrombosis. Hypercoag w/u not indicated. After much discussion and with shared decision-making, she will ***. Stressed the importance of close surveillance for s/sx of recurrent VTEs and we discussed when to seek immediate medical attention. Asked that she let all her providers know she has a hx of DVT, corwin if having any surgeries or hospitalizations. Prophylactic OAC recommended for future surgeries. Continue to avoid tobacco and prolonged periods of immobility. Patient was advised that any future pregnancy should be planned and that we may want to consider prophylactic anticoagulation during pregnancy. She was reminded to avoid OCPs or HRT. Recommend getting up and walking every 1-2 hours, doing foot pumps and wearing compression stockings during long travel. Pt to keep up with cancer screenings as a small % of VTE can be caused by an underlying malignancy. Pt verbalizes understanding and is in agreement with this plan.    Anti-Coagulation Plan:  - continue taking   - go to the ER for shortness of breath, chest pain, pain with deep inhalation, worsening leg swelling and/or pain in calf or leg   - avoid sedentary periods  - let all your providers know you take this medication  - don't stop this medication without permission from your doctor or our clinic  -  refills before you run out  - continue complete avoidance of tobacco products  - avoid hormonal therapies including estrogen or testosterone-containing meds, or raloxifene or tamoxifene (commonly used for osteoporosis)  - to avoid Aspirin and anti-inflammatories (eg. Advil, ibuprofen, Aleve, naproxen, etc) while anticoagulated   - Avoid skiing or other dangerous activities to reduce risk of head  injury and brain bleeds  - elevate legs as much as possible, use compression stockings/socks if directed by your provider  - if any bleeding lasting 30min without stopping, please seek care with your PCP, urgent care, or ED  - if having any invasive procedure, please make sure the doctor knows of your history of blood clots and current anticoagulation status  - recommended to see your PCP to discuss if you need age-appropriate cancer screenings as a small % of blood clots may be caused by an underlying malignancy  - reversal agents for most blood thinners are now available and used if you have major bleeding      STOPPING OAC    Anti-Coagulation Plan:  - finish  - go to the ER for shortness of breath, chest pain, pain with deep inhalation, worsening leg swelling and/or pain in calf or leg   - avoid sedentary periods  - let all your providers know you have a history of a blood clot, especially if having any surgeries or hospitalizations  - continue complete avoidance of tobacco products  - avoid hormonal therapies including estrogen or testosterone-containing meds, or raloxifene or tamoxifene (commonly used for osteoporosis)  - elevate legs as much as possible, use compression stockings/socks if sitting or standing for prolonged periods  - if having any invasive procedure, please make sure the doctor knows of your history of blood clots and current anticoagulation status  - recommended to see your PCP to discuss if you need age-appropriate cancer screenings as a small % of blood clots may be caused by an underlying malignancy    Smoking: continue complete avoidance of all tobacco products    Physical Activity: goal is 30 min of moderate activity 5 times a week    Weight Management and Nutrition: high protein, high vegetable diet like Mediterranean    Instructed to follow-up with PCP for remainder of adult medical needs: {YES/NO:63}  We will partner with other provider in the management of established vascular disease  and cardiometabolic risk factors    Studies to Be Obtained: ***  Labs to Be Obtained: ***    Follow up in: {FOLLOWUP:60050}    [facility] EXAM 4     Cc:

## 2024-04-01 ENCOUNTER — APPOINTMENT (OUTPATIENT)
Dept: PHYSICAL THERAPY | Facility: MEDICAL CENTER | Age: 39
End: 2024-04-01
Attending: INTERNAL MEDICINE
Payer: MEDICAID

## 2024-04-01 ENCOUNTER — APPOINTMENT (OUTPATIENT)
Dept: VASCULAR LAB | Facility: MEDICAL CENTER | Age: 39
End: 2024-04-01
Attending: INTERNAL MEDICINE
Payer: MEDICAID

## 2024-04-01 VITALS — HEART RATE: 84 BPM | DIASTOLIC BLOOD PRESSURE: 66 MMHG | SYSTOLIC BLOOD PRESSURE: 110 MMHG

## 2024-04-01 DIAGNOSIS — I82.432 ACUTE DEEP VEIN THROMBOSIS (DVT) OF POPLITEAL VEIN OF LEFT LOWER EXTREMITY (HCC): ICD-10-CM

## 2024-04-01 DIAGNOSIS — S32.89XS: ICD-10-CM

## 2024-04-01 PROBLEM — I82.409 DEEP VEIN THROMBOSIS (HCC): Status: RESOLVED | Noted: 2024-01-24 | Resolved: 2024-04-01

## 2024-04-01 PROCEDURE — 3074F SYST BP LT 130 MM HG: CPT | Performed by: NURSE PRACTITIONER

## 2024-04-01 PROCEDURE — 99212 OFFICE O/P EST SF 10 MIN: CPT | Performed by: NURSE PRACTITIONER

## 2024-04-01 PROCEDURE — 99214 OFFICE O/P EST MOD 30 MIN: CPT | Performed by: NURSE PRACTITIONER

## 2024-04-01 PROCEDURE — 3078F DIAST BP <80 MM HG: CPT | Performed by: NURSE PRACTITIONER

## 2024-04-01 PROCEDURE — 97110 THERAPEUTIC EXERCISES: CPT

## 2024-04-01 ASSESSMENT — ENCOUNTER SYMPTOMS
BLOOD IN STOOL: 0
HEMOPTYSIS: 0
SEIZURES: 0
MYALGIAS: 0
LOSS OF CONSCIOUSNESS: 0
FALLS: 0
BRUISES/BLEEDS EASILY: 1
SHORTNESS OF BREATH: 1
CLAUDICATION: 0

## 2024-04-01 NOTE — OP THERAPY DAILY TREATMENT
Outpatient Physical Therapy  DAILY TREATMENT     [facility] Outpatient Physical Therapy  [address]  [place] [address]  Phone:  [phone]  Fax:  [phone]    Date: 04/01/2024    Patient: Vanessa Crenshaw  YOB: 1985  MRN: [id number]     Time Calculation    Start time: 1116              Chief Complaint: Difficulty Walking    Visit #: 2    SUBJECTIVE:      OBJECTIVE:  Current objective measures:     3/25/24 pelvis trauma x ray:   Last Imaging Result(s):   Dx-Pelvis-Trauma Series  3-  AP inlet and outlet views of the pelvis demonstrate anatomic alignment and   hardware in good position     From eval:  Palpation      Additional Palpation Details  TTP L lateral thigh      Active Range of Motion   Lumbar   Flexion: decreased  Extension: decreased  Left lateral flexion: decreased  Right lateral flexion: decreased     Gait: Step to pattern with hyperextended knees; shuffling gait; forward trunk lean; utilizes FWW.      Transfer: cautious with sit to stand; utilizes FWW with erect posture.   -Pt able to transfer on/off treadmill without assistance indep.     Bed mobility: lying>sit with visual facial grimacing and increased time. Difficulty with scooting to EOB.       Therapeutic Exercises (CPT 81738):     1. pt education, re: desensitizing program for LLE, verbal review    2. sciatic nerve glides, x10 LLE, reviewed; VC's to monitor neck excursion to avoid discomfort    3. sit to stands without AD, x10 with slow eccentrics, Pt able to complete without FWW, incr fwd hinge with VC's to correct    4. pt education, re: TM with level 1 incline for ankles; slow pace and UE strengthening if ok with surgeon    5. bed mobility, log roll technique, verbal review    6. pt education, re: pillow under knees if supine lying to decr tension at l/s, edu to use pillow b/w knees    7. standing l/s extensions with FWW, x10, hep    8. pelvic tilts, x10, hep    9. LTR, x15,  slight incr soreness after 15 reps; hep    10. bridges supine, x15, fatigue; hep    11. pt education, re: core including pelvic floor    20. exercises at no additional charge today      Therapeutic Exercise Summary: Access Code: [email or web address]  URL: [email or web address]  Date: 04/01/2024  Prepared by: Kelvin Isaac    Exercises  - Sit to Stand  - 2 x daily - 7 x weekly - 1 sets - 10 reps  - Standing Lumbar Extension  - 2-3 x daily - 7 x weekly - 1 sets - 10 reps  - Supine Posterior Pelvic Tilt  - 1 x daily - 7 x weekly - 1-2 sets - 10 reps  - Supine Lower Trunk Rotation  - 1 x daily - 7 x weekly - 1-2 sets - 10 reps  - Supine Bridge  - 1 x daily - 7 x weekly - 1-2 sets - 10 reps - 20-3 sec  hold      Time-based treatments/modalities:           Pain rating (1-10) before treatment:    Pain rating (1-10) after treatment:     Pain Comments::  Aggravating/difficult ADL's: stairs, sleeping in recliner -tried sleeping in normal bed, walk the dog, going to the gym     ASSESSMENT:   Response to treatment:   Pt seen for first follow up since eval on 3/11/24 with gap in care due to insurance auth lag. She is about 3 months s/p ORIF of pelvis. Seen at the [facility] for follow up on 2/25/24 with DVT resolved. [facility] plan for weaning from walker, free to hike with dog as stability improves with walking stick due to fx healing. Pt demonstrating improved bed mobility and function compared to 3 weeks prior. Stating she is working on desensitization program and is at <3 min tolerance with lotion at lateral hip; stating about 50% improved with distribution of pain less. Core weakness and difficulty with dissociation at hips and pelvis.     Of note, pt having difficulty with intercourse and is hopeful for techniques and recommendations to improve this as medicaid not accepted by pelvic floor PT in [facility]. Will discuss specific aggravating activities in next session due to time constrains.     PLAN/RECOMMENDATIONS:   Plan for treatment:  therapy treatment to continue next visit.  Planned interventions for next visit: continue with current treatment.

## 2024-04-04 ENCOUNTER — PHYSICAL THERAPY (OUTPATIENT)
Dept: PHYSICAL THERAPY | Facility: MEDICAL CENTER | Age: 39
End: 2024-04-04
Attending: INTERNAL MEDICINE
Payer: MEDICAID

## 2024-04-04 DIAGNOSIS — S32.89XS: ICD-10-CM

## 2024-04-04 PROCEDURE — 97112 NEUROMUSCULAR REEDUCATION: CPT

## 2024-04-04 PROCEDURE — 97110 THERAPEUTIC EXERCISES: CPT

## 2024-04-04 NOTE — OP THERAPY DAILY TREATMENT
Outpatient Physical Therapy  DAILY TREATMENT     [facility] Outpatient Physical Therapy  [address]  [place] [address]  Phone:  [phone]  Fax:  [phone]    Date: 04/04/2024    Patient: Vanessa Crenshaw  YOB: 1985  MRN: [id number]     Time Calculation    Start time: 1032  Stop time: 1116 Time Calculation (min): 44 minutes         Chief Complaint: Back Problem    Visit #: 3    SUBJECTIVE:  Pt reporting she had better quality of sleep last night due to muscle relaxer from physiatrist. Went to gym and states she is feeling sweaty today. States the pillow b/w the knees is still feeling odd with sleeping.       OBJECTIVE:  Current objective measures:     Hip IR PROM-measured in PROM:   22 deg IR R  29 deg IR L    35 deg hip abd R        3/25/24 pelvis trauma x ray:   Last Imaging Result(s):   Dx-Pelvis-Trauma Series  3-  AP inlet and outlet views of the pelvis demonstrate anatomic alignment and   hardware in good position     From eval:  Palpation      Additional Palpation Details  TTP L lateral thigh      Active Range of Motion   Lumbar   Flexion: decreased  Extension: decreased  Left lateral flexion: decreased  Right lateral flexion: decreased     Gait: Step to pattern with hyperextended knees; shuffling gait; forward trunk lean; utilizes FWW.      Transfer: cautious with sit to stand; utilizes FWW with erect posture.   -Pt able to transfer on/off treadmill without assistance indep.     Bed mobility: lying>sit with visual facial grimacing and increased time. Difficulty with scooting to EOB.       Therapeutic Exercises (CPT 19044):     1. pt education, re: desensitizing program for LLE, NT    2. sciatic nerve glides, x10 LLE, NT    3. sit to stands without AD, x10 with slow eccentrics, NT    5. bed mobility, log roll technique, NT    6. pt education, re: pillow under knees if supine lying to decr tension at l/s, edu to use pillow b/w knees,  reviewed    7. standing l/s extensions with FWW, x10, NT    8. pelvic tilts, x10, reviewed in lying and sitting 10x ea; VC's and demo with improvement    9. LTR, x15, NT    10. bridges supine, x15, NT    12. hip flexor stretch, x20, VC's for increased out-toeing of foot; hep    13. hip adduction stretch standing at bar, x20 ea, VC's to sit on heel vs move laterally over LE; hep    14. AD adjustment for pt's height, SPC too high -adjusted appropriately for pt's height today      Therapeutic Exercise Summary: Pt performed these exercises with instruction and SPV.  Provided handout with these exercises for daily HEP.        Therapeutic Treatments and Modalities:     1. Neuromuscular Re-education (CPT 95610), manual hip flexor/quad stretch B x 7 in SL and LAD  in supine 5x10 sec ea    Time-based treatments/modalities:    Physical Therapy Timed Treatment Charges  Neuromusc re-ed, balance, coor, post minutes (CPT 00541): 8 minutes  Therapeutic exercise minutes (CPT 38275): 36 minutes      Pain rating (1-10) before treatment:    Pain rating (1-10) after treatment:     Pain Comments::  Aggravating/difficult ADL's: stairs, sleeping in recliner -tried sleeping in normal bed, walk the dog, going to the gym     ASSESSMENT:   Response to treatment:   Pt demonstrating hypomobility at hips, corwin into abd and hip IR; this is likely contributing to stiffness in addition to low back hypomobility. Reviewed x ray with pt to discuss that loss of motion will require full motion and has additional impact at surrounding joints. Good tolerance to manual and self stretching in session. Discussion with pt to bring list of specific difficulties with intercourse for best course of action; pelvic floor relaxation/activation vs. Positioning and Back/hip mobility concerns. Of note, adjusted SPC as was too high for pt; will address gait mechanics due to abnormal pattern and decreased trailing limb B.     PLAN/RECOMMENDATIONS:   Plan for treatment:  therapy treatment to continue next visit.  Planned interventions for next visit: continue with current treatment.  3x97110 and 1x97112 per visit   Discuss pelvic floor   Gait mechanics

## 2024-04-08 ENCOUNTER — PHYSICAL THERAPY (OUTPATIENT)
Dept: PHYSICAL THERAPY | Facility: MEDICAL CENTER | Age: 39
End: 2024-04-08
Attending: INTERNAL MEDICINE
Payer: MEDICAID

## 2024-04-08 DIAGNOSIS — S32.89XS: ICD-10-CM

## 2024-04-08 PROCEDURE — 97110 THERAPEUTIC EXERCISES: CPT

## 2024-04-08 NOTE — OP THERAPY DAILY TREATMENT
Outpatient Physical Therapy  DAILY TREATMENT     [facility] Outpatient Physical Therapy  [address]  [place] [address]  Phone:  [phone]  Fax:  [phone]    Date: 04/08/2024    Patient: Vanessa Crenshaw  YOB: 1985  MRN: [id number]     Time Calculation    Start time: 1118  Stop time: 1156 Time Calculation (min): 38 minutes         Chief Complaint: Back Problem and Hip Problem    Visit #: 4    SUBJECTIVE:  Pt reporting she had a lot of pain yesterday and fell asleep in the recliner. Stating she needed all three pills. Felt better once she started moving. Pt reporting her menstrual cycle started yesterday and this typically increases pain. Pt reporting notices pain with lying in missionary position. Notices pain with thrusting in missionary but SL and Qped position feel better.     OBJECTIVE:  Current objective measures:   Gait: Step to pattern with hyperextended knees; shuffling gait; forward trunk lean; utilizes FWW.     L/s AROM:  Flexion: WFL  Extension: min limited  Lateral SB: WFL B  Rotation: WFL B     Therapeutic Exercises (CPT 83632):     1. pt education, re: desensitizing program for LLE, NT    2. sciatic nerve glides, x10 LLE, NT    3. sit to stands without AD, x10 with slow eccentrics, NT    5. bed mobility, log roll technique, NT    6. pt education, re: pillow under knees if supine lying to decr tension at l/s, edu to use pillow b/w knees, reviewed    7. standing l/s extensions with FWW, x10, NT    8. pelvic tilts, x10, reviewed in lying and sitting 10x ea; VC's and demo with improvement    9. LTR, x15, reviewed    10. bridges supine, x15, NT    11. SL t/s rotations, x10-15, hep    12. hip flexor stretch, x20, reviewed    13. hip adduction stretch standing at bar, x20 ea, reviewed    15. pt education, re: try pillows for comfort with positioning, increased proceptive behavior and stretching prior intercourse    16. gait training, VC's  for increased trunk rotation for improved mechanics and, pt amb 4x50 ft without AD; tactile cuing at shoulders and visual demo for rotation and contralat limb movement; pt completing with incorrect timing but this improved with further verbal cuing and demo      Therapeutic Exercise Summary: Pt performed these exercises with instruction and SPV.  Provided handout with these exercises for daily HEP.          Time-based treatments/modalities:       Pain rating (1-10) before treatment:    Pain rating (1-10) after treatment:     Pain Comments::  Aggravating/difficult ADL's: stairs, sleeping in recliner -tried sleeping in normal bed, walk the dog, going to the gym     ASSESSMENT:   Response to treatment:   Good carryover to hep with pt demonstrating improving l/s ROM, still some limitations with extension but now minimal compared to initial evaluation. Gait mechanics are improving but may benefit from review in upcoming sessions due to difficulties with timing and adequate trunk rotation/arm swing in session. Discussion of techniques to improve comfort with intercourse -see list above. Pt encouraged to try these and discuss with therapist if stretching to pelvic floor is required in upcoming sessions.       PLAN/RECOMMENDATIONS:   Plan for treatment: therapy treatment to continue next visit.  Planned interventions for next visit: continue with current treatment.  3x97110 and 1x97112 per visit   Discuss pelvic floor   Gait mechanics review

## 2024-04-11 ENCOUNTER — PHYSICAL THERAPY (OUTPATIENT)
Dept: PHYSICAL THERAPY | Facility: MEDICAL CENTER | Age: 39
End: 2024-04-11
Attending: INTERNAL MEDICINE
Payer: MEDICAID

## 2024-04-11 DIAGNOSIS — S32.89XS: ICD-10-CM

## 2024-04-11 PROCEDURE — 97112 NEUROMUSCULAR REEDUCATION: CPT

## 2024-04-11 PROCEDURE — 97110 THERAPEUTIC EXERCISES: CPT

## 2024-04-11 NOTE — OP THERAPY DAILY TREATMENT
Outpatient Physical Therapy  DAILY TREATMENT     [facility] Outpatient Physical Therapy  [address]  [place] [address]  Phone:  [phone]  Fax:  [phone]    Date: 04/11/2024    Patient: Vanessa Crenshaw  YOB: 1985  MRN: [id number]     Time Calculation    Start time: 1415  Stop time: 1455 Time Calculation (min): 40 minutes         Chief Complaint: Back Problem and Hip Problem    Visit #: 5    SUBJECTIVE:  Pt reporting she had a lot of pain yesterday and fell asleep in the recliner. Stating she needed all three pills. Felt better once she started moving. Pt reporting her menstrual cycle started yesterday and this typically increases pain. Pt reporting notices pain with lying in missionary position. Notices pain with thrusting in missionary but SL and Qped position feel better.     OBJECTIVE:  Current objective measures:   Gait: Step to pattern with hyperextended knees; shuffling gait; forward trunk lean; utilizes FWW.     L/s AROM:  Flexion: WFL  Extension: min limited  Lateral SB: WFL B  Rotation: WFL B     Therapeutic Exercises (CPT 75301):     1. pt education, re: desensitizing program for LLE, NT    2. sciatic nerve glides, x10 LLE, NT    3. sit to stands without AD, x10 with slow eccentrics, NT    5. bed mobility, log roll technique, NT    6. pt education, re: pillow under knees if supine lying to decr tension at l/s, edu to use pillow b/w knees, reviewed    7. standing l/s extensions with FWW, x10, NT    8. pelvic tilts, x10, reviewed in lying and sitting 10x ea; VC's and demo with improvement    9. LTR, x15, reviewed    10. bridges supine, x15, NT    11. SL t/s rotations, x10-15, hep    12. hip flexor stretch, x20, reviewed    13. hip adduction stretch standing at bar, x20 ea, reviewed    15. pt education, re: try pillows for comfort with positioning, increased proceptive behavior and stretching prior intercourse, NT    16. gait training,  VC's for increased trunk rotation for improved mechanics and, reviewed 3x20 ft; VC's to increase trunk rotation and arm swing with improvements      Therapeutic Exercise Summary: Pt performed these exercises with instruction and SPV.  Provided handout with these exercises for daily HEP.        Therapeutic Treatments and Modalities:     1. Neuromuscular Re-education (CPT 58748), see below    Therapeutic Treatment and Modalities Summary: LAD to B hips OP //increased stiffness LLE    Time-based treatments/modalities:    Physical Therapy Timed Treatment Charges  Neuromusc re-ed, balance, coor, post minutes (CPT 07546): 12 minutes  Therapeutic exercise minutes (CPT 55725): 28 minutes    Pain rating (1-10) before treatment:    Pain rating (1-10) after treatment:     Pain Comments::  Aggravating/difficult ADL's: stairs, sleeping in recliner -tried sleeping in normal bed, walk the dog, going to the gym     ASSESSMENT:   Response to treatment:   Continued difficulties with gait sequencing and maintaining trunk rotation and arm swing; reviewed this in session, requiring additional cuing for timing of contralateral limbs -will review in upcoming sessions and promote new habit. Good tolerance to manual with tenderness noted at t/s and l/s near L4/5 region. Able to decreased referred pain into thigh following manual txt. Will redo manual in next session if good carryover. Of note, very hypomobile t/s; will intervene with additional mobility ex in future sessions.     PLAN/RECOMMENDATIONS:   Plan for treatment: therapy treatment to continue next visit.  Planned interventions for next visit: continue with current treatment.  3x97110 and 1x97112 per visit   Gait mechanics review

## 2024-04-15 ENCOUNTER — PHYSICAL THERAPY (OUTPATIENT)
Dept: PHYSICAL THERAPY | Facility: MEDICAL CENTER | Age: 39
End: 2024-04-15
Attending: INTERNAL MEDICINE
Payer: MEDICAID

## 2024-04-15 DIAGNOSIS — S32.89XS: ICD-10-CM

## 2024-04-15 PROCEDURE — 97110 THERAPEUTIC EXERCISES: CPT

## 2024-04-15 PROCEDURE — 97140 MANUAL THERAPY 1/> REGIONS: CPT

## 2024-04-15 NOTE — OP THERAPY DAILY TREATMENT
Outpatient Physical Therapy  DAILY TREATMENT     [facility] Outpatient Physical Therapy  [address]  [place] [address]  Phone:  [phone]  Fax:  [phone]    Date: 04/15/2024    Patient: Vanessa Crenshaw  YOB: 1985  MRN: [id number]     Time Calculation    Start time: 1116  Stop time: 1158 Time Calculation (min): 42 minutes         Chief Complaint: Loss Of Balance and Difficulty Walking    Visit #: 6    SUBJECTIVE:  Pt reporting intimacy has improved; after she was able to relax, the discomfort significantly improved. Has increased stretching. She has been managing pain well. Notices imbalance when she is fatigued after the gym and waking up and getting OOB.    OBJECTIVE:  Current objective measures:   Hypomobile t/s    Gait: Step to pattern with hyperextended knees; shuffling gait; forward trunk lean; utilizes FWW.     L/s AROM:  Flexion: WFL  Extension: min limited  Lateral SB: WFL B  Rotation: WFL B     Therapeutic Exercises (CPT 14266):     1. pt education, re: desensitizing program for LLE, NT    2. sciatic nerve glides, x10 LLE, NT    3. sit to stands without AD, x10 with slow eccentrics, NT    5. bed mobility, log roll technique, NT    6. pt education, re: pillow under knees if supine lying to decr tension at l/s, edu to use pillow b/w knees, reviewed    7. standing l/s extensions with FWW, x10, NT    8. pelvic tilts, x10, reviewed in lying and sitting 10x ea; VC's and demo with improvement    9. LTR, x15, reviewed    10. bridges supine, x15, NT    11. SL t/s rotations, x10-15, NT    12. hip flexor stretch, x20, reviewed    13. hip adduction stretch standing at bar, x20 ea, reviewed    15. pt education, re: try pillows for comfort with positioning, increased proceptive behavior and stretching prior intercourse, NT    16. gait training, VC's for increased trunk rotation for improved mechanics and, reviewed 3x20 ft; VC's to increase trunk  "rotation and arm swing with improvements    17. FR sequence: open book pec stretch, alt GH flexion, SA punches, x10-15 ea, hep    18. modified lester stretch, x30 sec ea, hep; edu to perform before getting OOB      Therapeutic Exercise Summary: Pt performed these exercises with instruction and SPV.  Provided handout with these exercises for daily HEP.        Therapeutic Treatments and Modalities:     1. Neuromuscular Re-education (CPT 22701), see below    Therapeutic Treatment and Modalities Summary: LAD to B hips OP //increased stiffness LLE  CPA to CTJ-L5 and rotational mobs following gr III in prone lying     Time-based treatments/modalities:    Physical Therapy Timed Treatment Charges  Manual therapy minutes (CPT 59890): 10 minutes  Therapeutic exercise minutes (CPT 14050): 32 minutes    Pain rating (1-10) before treatment:  \"stiff\"   Pain rating (1-10) after treatment:  \"stiff\"    Pain Comments::  Aggravating/difficult ADL's: stairs, sleeping in recliner -tried sleeping in normal bed, walk the dog, going to the gym     ASSESSMENT:   Response to treatment:   Pt continues to demonstrate hypomobile t/s which, in addition to fused pelvis, may be creating more stress and need for mobility at l/s. Good response to manual with some pain reported during but no remaining discomfort. Will continue to address limited motion in this region and reassess gait next session. Will additionally introduce balance challenges due to pt reported difficulty on compliant surfaces and fear of LOB when fatigued.    PLAN/RECOMMENDATIONS:   Plan for treatment: therapy treatment to continue next visit.  Planned interventions for next visit: continue with current treatment.  3x97110 and 1x97112 per visit   Gait mechanics review    "

## 2024-04-17 ENCOUNTER — APPOINTMENT (OUTPATIENT)
Dept: PHYSICAL THERAPY | Facility: MEDICAL CENTER | Age: 39
End: 2024-04-17
Attending: INTERNAL MEDICINE
Payer: MEDICAID

## 2024-04-17 ENCOUNTER — HOSPITAL ENCOUNTER (OUTPATIENT)
Dept: RADIOLOGY | Facility: MEDICAL CENTER | Age: 39
End: 2024-04-17
Attending: NURSE PRACTITIONER
Payer: MEDICAID

## 2024-04-17 DIAGNOSIS — I82.432 ACUTE DEEP VEIN THROMBOSIS (DVT) OF POPLITEAL VEIN OF LEFT LOWER EXTREMITY (HCC): ICD-10-CM

## 2024-04-17 PROCEDURE — 93971 EXTREMITY STUDY: CPT | Mod: LT

## 2024-04-18 ENCOUNTER — APPOINTMENT (OUTPATIENT)
Dept: PHYSICAL THERAPY | Facility: MEDICAL CENTER | Age: 39
End: 2024-04-18
Attending: INTERNAL MEDICINE
Payer: MEDICAID

## 2024-04-22 ENCOUNTER — PHYSICAL THERAPY (OUTPATIENT)
Dept: PHYSICAL THERAPY | Facility: MEDICAL CENTER | Age: 39
End: 2024-04-22
Attending: INTERNAL MEDICINE
Payer: MEDICAID

## 2024-04-22 DIAGNOSIS — S32.89XS: ICD-10-CM

## 2024-04-22 PROCEDURE — 97110 THERAPEUTIC EXERCISES: CPT

## 2024-04-22 PROCEDURE — 97112 NEUROMUSCULAR REEDUCATION: CPT

## 2024-04-22 NOTE — OP THERAPY DAILY TREATMENT
Outpatient Physical Therapy  DAILY TREATMENT     [facility] Outpatient Physical Therapy  [address]  [place] [address]  Phone:  [phone]  Fax:  [phone]    Date: 2024    Patient: Vanessa Crenshaw  YOB: 1985  MRN: [id number]     Time Calculation    Start time: 0946  Stop time: 1032 Time Calculation (min): 46 minutes         Chief Complaint: Hip Problem    Visit #: 7    SUBJECTIVE:  Pt reporting some increased back pain this week with referred s/s into the front of the legs and back of the thighs. Stating she went to the gym 5x/week and has increased the walking; she was able to walk her dog but did so on  and avoiding other people. After prolonged sitting will experience pain and stiffness in the back. Pt reporting she has noticed an improvement in pain with bed mobility/getting OOB after the modified lester stretch.     OBJECTIVE:  Current objective measures:   Mini-BESTest:  Anticipatory: 5/6  -Sit to stand: 2  -Rise to toes: 2  -Stand on one le     Reactive postural control: /6  -Compensatory stepping correction -fwd: 2  -Compensatory stepping correction -bwd: 2  -Compensatory stepping correction -lateral: 2    Sensory orientation: 4/6  -Stance feet together, EO, firm: 2  -Stance feet together, EC, foam: 1  -Incline, EC: 1    Dynamic gait: 10/10  -Change in gait speed: 2  -Walk with head turns horizontal: 2  -Walk with pivot turns: 2  -Step over obstacles: 2    -Timed up and go with dual task (3 meter walk) (>10 sec signifies increased fall risk): 2/2  TU sec  Dual task TU sec (Mod score >10% difference compared to initial TUG score)    Total: 25/28 (Less than 21 indicates increased risk of falling)     Therapeutic Exercises (CPT 74321):     2. sciatic nerve glides, x10 LLE, reviewed    3. sit to stands without AD, x10 with slow eccentrics, NT    7. standing l/s extensions with FWW, x10, NT    8. pelvic tilts, x10,  "NT    9. LTR, x15, NT    10. bridges supine, x15, NT    11. SL t/s rotations, x10-15, NT    12. hip flexor stretch, x20, NT    13. hip adduction stretch standing at bar, x20 ea, NT, NT    16. gait training, VC's for increased trunk rotation for improved mechanics and, NT    17. FR sequence: open book pec stretch, alt GH flexion, SA punches, x10-15 ea, reviewed    18. modified lester stretch, x30 sec ea, reviewed      Therapeutic Exercise Summary: Wobble board sagittal and frontal plane; x15 ea  SLS on foam; 3x20 sec ea   Heel raise to hold; 5x3 sec //intermit LOB        Therapeutic Treatments and Modalities:     1. Neuromuscular Re-education (CPT 86761), see below, x12 min    Therapeutic Treatment and Modalities Summary: MiniBESTest completion      Time-based treatments/modalities:    Physical Therapy Timed Treatment Charges  Neuromusc re-ed, balance, coor, post minutes (CPT 60433): 12 minutes  Therapeutic exercise minutes (CPT 50487): 34 minutes    Pain rating (1-10) before treatment: 0/10   Pain rating (1-10) after treatment:  \"stiff\"    Pain Comments::  Aggravating/difficult ADL's: stairs, sleeping in recliner -tried sleeping in normal bed, walk the dog, going to the gym     ASSESSMENT:   Response to treatment:   Pt not considered a fall risk based on MiniBESTest today; minimal deficits noted and these improved with learning during the testing. Suspect pt has some fear avoidance and lack of confidence vs. True balance deficits; however, will complete HiMAT balance test for assessment of high level balance deficits in next visit.       PLAN/RECOMMENDATIONS:   Plan for treatment: therapy treatment to continue next visit.  Planned interventions for next visit: continue with current treatment.  3x97110 and 1x97112 per visit   Review gait  Complete HiMAT balance test    "

## 2024-04-24 ENCOUNTER — APPOINTMENT (OUTPATIENT)
Dept: RADIOLOGY | Facility: MEDICAL CENTER | Age: 39
End: 2024-04-24
Attending: NURSE PRACTITIONER
Payer: MEDICAID

## 2024-04-25 ENCOUNTER — PHYSICAL THERAPY (OUTPATIENT)
Dept: PHYSICAL THERAPY | Facility: MEDICAL CENTER | Age: 39
End: 2024-04-25
Attending: INTERNAL MEDICINE
Payer: MEDICAID

## 2024-04-25 DIAGNOSIS — S32.89XS: ICD-10-CM

## 2024-04-25 PROCEDURE — 97112 NEUROMUSCULAR REEDUCATION: CPT

## 2024-04-25 PROCEDURE — 97110 THERAPEUTIC EXERCISES: CPT

## 2024-04-25 NOTE — OP THERAPY DAILY TREATMENT
Outpatient Physical Therapy  DAILY TREATMENT     [facility] Outpatient Physical Therapy  [address]  [place] [address]  Phone:  [phone]  Fax:  [phone]    Date: 2024    Patient: Vanessa Crenshaw  YOB: 1985  MRN: [id number]     Time Calculation    Start time: 1546  Stop time: 1632 Time Calculation (min): 46 minutes         Chief Complaint: Back Problem    Visit #: 8    SUBJECTIVE:  Pt reporting has had some back discomfort and now is noticing a shin splint; is unsure if the back is causing this pain. Pt has had back pain on/off since Monday the core exercise on .  Has additionally had some shin pain. Both back pain and shin pain have resolved since this morning.     OBJECTIVE:  Current objective measures:   Mini-BESTest:  Anticipatory: /6  -Sit to stand: 2  -Rise to toes: 2  -Stand on one le     Reactive postural control:   -Compensatory stepping correction -fwd: 2  -Compensatory stepping correction -bwd: 2  -Compensatory stepping correction -lateral: 2    Sensory orientation: /6  -Stance feet together, EO, firm: 2  -Stance feet together, EC, foam: 1  -Incline, EC: 1    Dynamic gait: 10/10  -Change in gait speed: 2  -Walk with head turns horizontal: 2  -Walk with pivot turns: 2  -Step over obstacles: 2    -Timed up and go with dual task (3 meter walk) (>10 sec signifies increased fall risk): 2/2  TU sec  Dual task TU sec (Mod score >10% difference compared to initial TUG score)    Total: 25/28 (Less than 21 indicates increased risk of falling)     Therapeutic Exercises (CPT 28136):     2. sciatic nerve glides, x10 LLE, reviewed    3. sit to stands without AD, x10 with slow eccentrics, NT    7. standing l/s extensions with FWW, x10, NT    8. pelvic tilts, x10, NT    9. LTR, x15, NT    10. bridges supine, x15, NT    11. SL t/s rotations, x10-15, NT    12. hip flexor stretch, x20, NT    13. hip adduction stretch  standing at bar, x20 ea, NT, NT    16. gait training, VC's for increased trunk rotation for improved mechanics and, NT    17. FR sequence: open book pec stretch, alt GH flexion, SA punches, x10-15 ea, reviewed    18. modified ghulma stretch, x30 sec ea, verbal review    19. standing shoulder extension ROM with dowel, x10, x10 with 1-2 sec hold, hep    20. pt education, re: discussed that bone pain may last for several months and is common      Therapeutic Exercise Summary: Access Code: [email or web address]  URL: [email or web address]  Date: 04/25/2024  Prepared by: Kelvin Isaac    Exercises  - Sit to Stand  - 2 x daily - 7 x weekly - 1 sets - 10 reps  - Standing Lumbar Extension  - 2-3 x daily - 7 x weekly - 1 sets - 10 reps  - Supine Posterior Pelvic Tilt  - 1 x daily - 7 x weekly - 1-2 sets - 10 reps  - Supine Lower Trunk Rotation  - 1 x daily - 7 x weekly - 1-2 sets - 10 reps  - Supine Bridge  - 1 x daily - 7 x weekly - 1-2 sets - 10 reps - 20-3 sec  hold  - Lateral Lunge Adductor Stretch with Counter Support  - 1-2 x daily - 5-7 x weekly - 1 sets - 20 reps  - Hip Flexor Stretch on Step  - 1 x daily - 5-7 x weekly - 1-2 sets - 20 reps  - Sidelying Thoracic Lumbar Rotation  - 1 x daily - 7 x weekly - 1 sets - 10 reps  - Open Book Chest Stretch on Towel Roll  - 1 x daily - 3-7 x weekly - 1-2 sets - 15 reps  - Modified Ghulam Stretch  - 1-2 x daily - 7 x weekly - 1-2 sets - 30 sec hold  - Supine Alternating Shoulder Flexion  - 1 x daily - 3-7 x weekly - 1-2 sets - 10 reps  - Supine Scapular Protraction in Flexion with Dumbbells  - 7 x weekly - 1-2 sets - 10-15 reps        Therapeutic Treatments and Modalities:     1. Neuromuscular Re-education (CPT 79942), see below, x12 min    Therapeutic Treatment and Modalities Summary: CPA and rotational mobs gr III to CTJ-T10 in prone lying      Time-based treatments/modalities:    Physical Therapy Timed Treatment Charges  Neuromusc re-ed, balance, coor, post minutes (CPT  "14847): 9 minutes  Therapeutic exercise minutes (CPT 29266): 37 minutes    Pain rating (1-10) before treatment: 4/10 R lower back and buttock   Pain rating (1-10) after treatment:  \"stiff\"    Pain Comments::  Aggravating/difficult ADL's: stairs, sleeping in recliner -tried sleeping in normal bed, walk the dog, going to the gym     ASSESSMENT:   Response to treatment:   Deferred HiMAT balance assessment today as pt had other concerns of back pain. Spent several min discussing that pain at this point in rehab can still be very normal due to bony remodel, increased weight at pelvis with WB, and likely previous weakness and stiffness at spine resulting in increased demands. Words of encouragement provided to continue as long as s/s are improving; advised pt to speak with PCP if s/s overall not improving or worsening with time since surgery. Pt continuing to demo restrictions at t/s which may be impacting low back; will continue to address hypomobility globally at spine and continue with stabilization routine.       PLAN/RECOMMENDATIONS:   Plan for treatment: therapy treatment to continue next visit.  Planned interventions for next visit: continue with current treatment.  3x97110 and 1x97112 per visit         "

## 2024-05-01 ENCOUNTER — APPOINTMENT (OUTPATIENT)
Dept: PHYSICAL THERAPY | Facility: MEDICAL CENTER | Age: 39
End: 2024-05-01
Attending: INTERNAL MEDICINE
Payer: MEDICAID

## 2024-05-01 DIAGNOSIS — S32.89XS: ICD-10-CM

## 2024-05-01 NOTE — OP THERAPY PROGRESS SUMMARY
Outpatient Physical Therapy  PROGRESS SUMMARY NOTE      [facility] Outpatient Physical Therapy  [address]  [facility] [address]  Phone:  [phone]  Fax:  [phone]    Date of Visit: 05/01/2024    Patient: Vanessa Crenshaw  YOB: 1985  MRN: [id number]     Referring Provider: Vanessa Carlson M.D.  [address] [place] [id number]   Referring Diagnosis Fracture of other parts of pelvis, sequela [S32.89XS]     Visit Diagnoses     ICD-10-CM   1. Fracture of other parts of pelvis, sequela  S32.89XS       Rehab Potential: good    Progress Report Period: 3/11/24-5/1/24    Functional Assessment Used          Objective Findings and Assessment:   Patient progression towards goals: Pt has been seen for a total of 9 of 12 approved visits of skilled physical therapy. Is demonstrating improvements in gait, lumbar spine and pelvic mobility, and is returning to PLOF including gym 4-5x/week. She has recently increased her walking and able to complete without SPC. She is being seen by pain management as she continues to experience intermittent back and pelvic pain. Pt will likely benefit from additional visits due to her previous active lifestyle and cont'd current impairments/complaints.     Of note, pt has been complaining of anterior R knee and shin pain over the past two sessions. Pain prev resolved but has now returned, therefore formal assessment in today's session. Pt reporting of pain, increased during WB and improved with rest. Currently experiencing no night time pain. Pain described as aching and throbbing at anterior shin. Pain has been ongoing since 1.5 mile walk earlier this week. Due to localized and subjective s/s with positive squeeze test, this author is concerned re: possibility of shin splints vs. Stress fracture. Pt will be following up with her PCP tomorrow and will discuss these concerns. Pt advised to utilize SPC in LUE for amb and  mitigate excessive WB and walking until her appt tomorrow.     Objective findings and assessment details: Today:   Pain localized to about 6 cm along R shin  +pain with WB; relieved with resting/sitting  - night pain   +throbbing pain at shin  Positive fibular/tibia squeeze test: reproduces anterior shin pain      Prev sessions:  Improved l/s ROM, corwin into extension    Mini-BESTest:  Anticipatory: 5/6  -Sit to stand: 2  -Rise to toes: 2  -Stand on one le     Reactive postural control: 6/6  -Compensatory stepping correction -fwd: 2  -Compensatory stepping correction -bwd: 2  -Compensatory stepping correction -lateral: 2    Sensory orientation: 4/6  -Stance feet together, EO, firm: 2  -Stance feet together, EC, foam: 1  -Incline, EC: 1    Dynamic gait: 10/10  -Change in gait speed: 2  -Walk with head turns horizontal: 2  -Walk with pivot turns: 2  -Step over obstacles: 2    -Timed up and go with dual task (3 meter walk) (>10 sec signifies increased fall risk): 2/2  TU sec  Dual task TU sec (Mod score >10% difference compared to initial TUG score)    Total: 25/28 (Less than 21 indicates increased risk of falling)       Goals:   Short Term Goals:   1. Pt will be independent with written HEP. (Met)  2. Pt will demonstrate pain-free transfers. (Partially met/ongoing)    Short term goal time span:  2-4 weeks      Long Term Goals:    1. Pt will be independent with written HEP. (Met)  2. Pt a sig improvement in WOMAC score (eval: 63.54). (partially met/ongoing)  3. Pt will demonstrate normal gait pattern without AD on level terrain.  (Partially met/ongoing)  4. Pt will be able to ascend/descend one flight of stairs with 1 HR and no AD without incr in s/s. (Partially met/ongoing)  5. Pt will be able to return to gym at least 3x/wk with strengthening routine with min modifications if any to improve QOL. (Met)    Long term goal time span:  6-8 weeks    Plan:   Planned therapy interventions:  Neuromuscular  Re-education (CPT 04803) and Therapeutic Exercise (CPT 63974)  Frequency:  1x week  Duration in visits:  6  Plan details:  3x97110 and 1x97112 per visit for 6 additional visits    POC extended to allot for lag in auth time    UPOC: 7/19/24      Referring provider co-signature:  I have reviewed this plan of care and my co-signature certifies the need for services.     Certification Period: 05/01/2024 to 7/19/24    Physician Signature: ________________________________ Date: ______________

## 2024-05-01 NOTE — OP THERAPY DAILY TREATMENT
Outpatient Physical Therapy  DAILY TREATMENT     [facility] Outpatient Physical [facility]  [address]  [place] [address]  Phone:  [phone]  Fax:  [phone]    Date: 2024    Patient: Vanessa Crenshaw  YOB: 1985  MRN: [id number]     Time Calculation    Start time: 1030  Stop time: 1127 Time Calculation (min): 57 minutes         Chief Complaint: Hip Problem and Back Problem    Visit #: 9    SUBJECTIVE:  Pt reporting pulsating in the R knee and shin pain; pain moves from L to R anterior shin. Went on three walks (1.5 miles); felt the pain the next day. Has been having a lot of pain since Monday. Relief with sitting. Is now noticing pain with sitting; intermittent. Pain is about 4/10. No improvement using pain medication and tylenol. Back pain and pelvis have improved but stiffness still apparent.     OBJECTIVE:  Current objective measures:   Objective:  Today:   Pain localized to about 6 cm along R shin  +pain with WB; relieved with resting/sitting  - night pain   +throbbing pain at shin  Positive fibular/tibia squeeze test: reproduces anterior shin pain      Prev sessions:  Improved l/s ROM, corwin into extension    Mini-BESTest:  Anticipatory: /6  -Sit to stand: 2  -Rise to toes: 2  -Stand on one le     Reactive postural control:   -Compensatory stepping correction -fwd: 2  -Compensatory stepping correction -bwd: 2  -Compensatory stepping correction -lateral: 2    Sensory orientation: /6  -Stance feet together, EO, firm: 2  -Stance feet together, EC, foam: 1  -Incline, EC: 1    Dynamic gait: 10/10  -Change in gait speed: 2  -Walk with head turns horizontal: 2  -Walk with pivot turns: 2  -Step over obstacles: 2    -Timed up and go with dual task (3 meter walk) (>10 sec signifies increased fall risk): 2/2  TU sec  Dual task TU sec (Mod score >10% difference compared to initial TUG score)    Total: 25/28 (Less than 21 indicates  "increased risk of falling)         Therapeutic Exercises (CPT 83097):     1. nustepper    2. pt education, re: monitor WB and ultilze SPC until seen by provider      Therapeutic Exercise Summary: Access Code: [email or web address]  URL: [email or web address]  Date: 04/25/2024  Prepared by: Kelvin Isaac    Exercises  - Sit to Stand  - 2 x daily - 7 x weekly - 1 sets - 10 reps  - Standing Lumbar Extension  - 2-3 x daily - 7 x weekly - 1 sets - 10 reps  - Supine Posterior Pelvic Tilt  - 1 x daily - 7 x weekly - 1-2 sets - 10 reps  - Supine Lower Trunk Rotation  - 1 x daily - 7 x weekly - 1-2 sets - 10 reps  - Supine Bridge  - 1 x daily - 7 x weekly - 1-2 sets - 10 reps - 20-3 sec  hold  - Lateral Lunge Adductor Stretch with Counter Support  - 1-2 x daily - 5-7 x weekly - 1 sets - 20 reps  - Hip Flexor Stretch on Step  - 1 x daily - 5-7 x weekly - 1-2 sets - 20 reps  - Sidelying Thoracic Lumbar Rotation  - 1 x daily - 7 x weekly - 1 sets - 10 reps  - Open Book Chest Stretch on Towel Roll  - 1 x daily - 3-7 x weekly - 1-2 sets - 15 reps  - Modified Ghulam Stretch  - 1-2 x daily - 7 x weekly - 1-2 sets - 30 sec hold  - Supine Alternating Shoulder Flexion  - 1 x daily - 3-7 x weekly - 1-2 sets - 10 reps  - Supine Scapular Protraction in Flexion with Dumbbells  - 7 x weekly - 1-2 sets - 10-15 reps        Therapeutic Treatments and Modalities:     1. Neuromuscular Re-education (CPT 09189), see below, x12 min    Therapeutic Treatment and Modalities Summary: Gentle STM to anterior shin and posterior calf L in hooklying to stimulate blood flow and ease musculature      Time-based treatments/modalities:    Physical Therapy Timed Treatment Charges  Manual therapy minutes (CPT 20182): 12 minutes  Therapeutic exercise minutes (CPT 81755): 30 minutes    Pain rating (1-10) before treatment: 4/10 R lower back and buttock   Pain rating (1-10) after treatment:  \"stiff\"    Pain Comments::  Aggravating/difficult ADL's: stairs, sleeping in " recliner -tried sleeping in normal bed, walk the dog, going to the gym     ASSESSMENT:   Response to treatment:   Pt has been seen for a total of 9 of 12 approved visits of skilled physical therapy. Is demonstrating improvements in gait, lumbar spine and pelvic mobility, and is returning to PLOF including gym 4-5x/week. She has recently increased her walking and able to complete without SPC. She is being seen by pain management as she continues to experience intermittent back and pelvic pain. Pt will likely benefit from additional visits due to her previous active lifestyle and cont'd current impairments/complaints.     Of note, pt has been complaining of anterior R knee and shin pain over the past two sessions. Pain prev resolved but has now returned, therefore formal assessment in today's session. Pt reporting of pain, increased during WB and improved with rest. Currently experiencing no night time pain. Pain described as aching and throbbing at anterior shin. Pain has been ongoing since 1.5 mile walk earlier this week. Due to localized and subjective s/s with positive squeeze test, this author is concerned re: possibility of shin splints vs. Stress fracture. Pt will be following up with her PCP tomorrow and will discuss these concerns. Pt advised to utilize SPC in LUE for amb and mitigate excessive WB and walking until her appt tomorrow.      PLAN/RECOMMENDATIONS:   Plan for treatment: therapy treatment to continue next visit.  Planned interventions for next visit: continue with current treatment.  3x97110 and 1x97112 per visit

## 2024-05-07 ENCOUNTER — PHYSICAL THERAPY (OUTPATIENT)
Dept: PHYSICAL THERAPY | Facility: MEDICAL CENTER | Age: 39
End: 2024-05-07
Attending: INTERNAL MEDICINE
Payer: MEDICAID

## 2024-05-07 DIAGNOSIS — M25.561 RIGHT KNEE PAIN, UNSPECIFIED CHRONICITY: ICD-10-CM

## 2024-05-07 DIAGNOSIS — S32.89XS: ICD-10-CM

## 2024-05-07 NOTE — OP THERAPY DAILY TREATMENT
Outpatient Physical Therapy  DAILY TREATMENT     [facility] Outpatient Physical Therapy  [address]  [place] [address]  Phone:  [phone]  Fax:  [phone]    Date: 2024    Patient: Vanessa Crenshaw  YOB: 1985  MRN: [id number]     Time Calculation    Start time: 1631  Stop time: 1715 Time Calculation (min): 44 minutes         Chief Complaint: Knee Problem and Back Problem    Visit #: 10    SUBJECTIVE:  Pt stating the x ray machine was down at the [facility]. Overall, pain is improving and has decreased her walking. Has had improved symptoms. Is still noticing some stiffness. Is still taking the hydrocodone as needed. Pain management prescribed mild muscle relaxer and naproxen.       OBJECTIVE:  Current objective measures:   Objective:  Today:   Pain localized to about 6 cm along R shin  +pain with WB; relieved with resting/sitting  - night pain   +throbbing pain at shin  Positive fibular/tibia squeeze test: reproduces anterior shin pain      Prev sessions:  Improved l/s ROM, corwin into extension    Mini-BESTest:  Anticipatory: /6  -Sit to stand: 2  -Rise to toes: 2  -Stand on one le     Reactive postural control:   -Compensatory stepping correction -fwd: 2  -Compensatory stepping correction -bwd: 2  -Compensatory stepping correction -lateral: 2    Sensory orientation: /6  -Stance feet together, EO, firm: 2  -Stance feet together, EC, foam: 1  -Incline, EC: 1    Dynamic gait: 10/10  -Change in gait speed: 2  -Walk with head turns horizontal: 2  -Walk with pivot turns: 2  -Step over obstacles: 2    -Timed up and go with dual task (3 meter walk) (>10 sec signifies increased fall risk): 2/2  TU sec  Dual task TU sec (Mod score >10% difference compared to initial TUG score)    Total: 25/28 (Less than 21 indicates increased risk of falling)         Therapeutic Exercises (CPT 61597):     1. recumbent bike, x6 min 30 sec,  "cardiovascular warm up    2. pt education, re: extensions prior to bed and first thing in am    3. t/s rotations in SL, verbal review    4. FR sequence: open book, alt GH flexion, SA punches, x10 ea, progression of mobility on FR; discomfort with sequence on FR    5. PPU's, x10, hep    6. standing l/s extensions at wall, x10, hep    7. seated t/s extensions, x10, hep      Therapeutic Exercise Summary: Access Code: [email or web address]  URL: [email or web address]  Date: 04/25/2024  Prepared by: Kelvin Isaac    Exercises  - Sit to Stand  - 2 x daily - 7 x weekly - 1 sets - 10 reps  - Standing Lumbar Extension  - 2-3 x daily - 7 x weekly - 1 sets - 10 reps  - Supine Posterior Pelvic Tilt  - 1 x daily - 7 x weekly - 1-2 sets - 10 reps  - Supine Lower Trunk Rotation  - 1 x daily - 7 x weekly - 1-2 sets - 10 reps  - Supine Bridge  - 1 x daily - 7 x weekly - 1-2 sets - 10 reps - 20-3 sec  hold  - Lateral Lunge Adductor Stretch with Counter Support  - 1-2 x daily - 5-7 x weekly - 1 sets - 20 reps  - Hip Flexor Stretch on Step  - 1 x daily - 5-7 x weekly - 1-2 sets - 20 reps  - Sidelying Thoracic Lumbar Rotation  - 1 x daily - 7 x weekly - 1 sets - 10 reps  - Open Book Chest Stretch on Towel Roll  - 1 x daily - 3-7 x weekly - 1-2 sets - 15 reps  - Modified Ghulam Stretch  - 1-2 x daily - 7 x weekly - 1-2 sets - 30 sec hold  - Supine Alternating Shoulder Flexion  - 1 x daily - 3-7 x weekly - 1-2 sets - 10 reps  - Supine Scapular Protraction in Flexion with Dumbbells  - 7 x weekly - 1-2 sets - 10-15 reps        Therapeutic Treatments and Modalities:     Therapeutic Treatment and Modalities Summary:       Time-based treatments/modalities:    Physical Therapy Timed Treatment Charges  Therapeutic exercise minutes (CPT 66426): 44 minutes    Pain rating (1-10) before treatment: 2/10 R lower back and buttock   Pain rating (1-10) after treatment:  \"stiff\"    Pain Comments::  Aggravating/difficult ADL's: stairs, sleeping in recliner " -tried sleeping in normal bed, walk the dog, going to the gym     ASSESSMENT:   Response to treatment:   Pt has been seen for a total of 10 of 12 approved visits of skilled physical therapy. Pt demo good tolerance to progression of l/s mobilizations at wall and in prone. Discussion today to complete AROM exercises prior to bed and in am to assess whether this assists with early morning stiffness. Will follow up with pt on this and progress as tolerated.   Of note, pt is awaiting imaging on shin. Discussed continuing to take break from excess loading on RLE and focus on nonWB activities and cardio.     PLAN/RECOMMENDATIONS:   Plan for treatment: therapy treatment to continue next visit.  Planned interventions for next visit: continue with current treatment.  3x97110 and 1x97112 per visit

## 2024-05-09 ENCOUNTER — PHYSICAL THERAPY (OUTPATIENT)
Dept: PHYSICAL THERAPY | Facility: MEDICAL CENTER | Age: 39
End: 2024-05-09
Attending: INTERNAL MEDICINE
Payer: MEDICAID

## 2024-05-09 DIAGNOSIS — S32.89XS: ICD-10-CM

## 2024-05-09 NOTE — OP THERAPY DAILY TREATMENT
"  Outpatient Physical Therapy  DAILY TREATMENT     [facility] Outpatient Physical Therapy  [address]  [place] [address]  Phone:  [phone]  Fax:  [phone]    Date: 2024    Patient: Vanessa Crenshaw  YOB: 1985  MRN: [id number]     Time Calculation    Start time: 1545  Stop time: 1623 Time Calculation (min): 38 minutes         Chief Complaint: Back Problem    Visit #: 11    SUBJECTIVE:  Pt reporting shin was \"acting up\" this morning. She has only done brief walking. Has completing cardio on the stationary bike. Pt reporting she completed the cobras. Does notice a smoother transition in the am but still stiff. Is still having some difficulties with sleeping through the night. Pain averaging about a 3/10 at back.     OBJECTIVE:  Current objective measures:   Objective:  Prev session:  Improved l/s ROM, corwin into extension    Mini-BESTest:  Anticipatory: 5/6  -Sit to stand: 2  -Rise to toes: 2  -Stand on one le     Reactive postural control: /6  -Compensatory stepping correction -fwd: 2  -Compensatory stepping correction -bwd: 2  -Compensatory stepping correction -lateral: 2    Sensory orientation: 4/6  -Stance feet together, EO, firm: 2  -Stance feet together, EC, foam: 1  -Incline, EC: 1    Dynamic gait: 10/10  -Change in gait speed: 2  -Walk with head turns horizontal: 2  -Walk with pivot turns: 2  -Step over obstacles: 2    -Timed up and go with dual task (3 meter walk) (>10 sec signifies increased fall risk): 2/2  TU sec  Dual task TU sec (Mod score >10% difference compared to initial TUG score)    Total: 25/28 (Less than 21 indicates increased risk of falling)         Therapeutic Exercises (CPT 81186):     1. recumbent bike, x6 min 30 sec, cardiovascular warm up    2. pt education, re: extensions prior to bed and first thing in am    3. t/s rotations in SL, verbal review    4. FR sequence: open book, alt GH flexion, SA " punches, x10 ea, progression of mobility on FR; discomfort with sequence on FR    5. PPU's, x10, hep    6. standing l/s extensions at wall, x10, hep    7. seated t/s extensions, x10, hep    9. pallof press, 10x ea 3 sec hold, fatigue; repeated VC's for set up due to completing as bicep curls    10. resisted walking, x5-7 fwd, bwd, lateral walking; one sport cord, most difficutly with lateral walking to L; fatigue; SPV for lateral walking to L    11. antirotation walking, 5x ea 1-2 sec hold, hep      Therapeutic Exercise Summary: Access Code: [email or web address]  URL: [email or web address]  Date: 04/25/2024  Prepared by: Kelvin Isaac    Exercises  - Sit to Stand  - 2 x daily - 7 x weekly - 1 sets - 10 reps  - Standing Lumbar Extension  - 2-3 x daily - 7 x weekly - 1 sets - 10 reps  - Supine Posterior Pelvic Tilt  - 1 x daily - 7 x weekly - 1-2 sets - 10 reps  - Supine Lower Trunk Rotation  - 1 x daily - 7 x weekly - 1-2 sets - 10 reps  - Supine Bridge  - 1 x daily - 7 x weekly - 1-2 sets - 10 reps - 20-3 sec  hold  - Lateral Lunge Adductor Stretch with Counter Support  - 1-2 x daily - 5-7 x weekly - 1 sets - 20 reps  - Hip Flexor Stretch on Step  - 1 x daily - 5-7 x weekly - 1-2 sets - 20 reps  - Sidelying Thoracic Lumbar Rotation  - 1 x daily - 7 x weekly - 1 sets - 10 reps  - Open Book Chest Stretch on Towel Roll  - 1 x daily - 3-7 x weekly - 1-2 sets - 15 reps  - Modified Ghulam Stretch  - 1-2 x daily - 7 x weekly - 1-2 sets - 30 sec hold  - Supine Alternating Shoulder Flexion  - 1 x daily - 3-7 x weekly - 1-2 sets - 10 reps  - Supine Scapular Protraction in Flexion with Dumbbells  - 7 x weekly - 1-2 sets - 10-15 reps        Therapeutic Treatments and Modalities:     Therapeutic Treatment and Modalities Summary:       Time-based treatments/modalities:    Physical Therapy Timed Treatment Charges  Therapeutic exercise minutes (CPT 33932): 38 minutes    Pain rating (1-10) before treatment: 0/10 R lower back and  "buttock; 0/10 shin  Pain rating (1-10) after treatment:  \"stiff\"    Pain Comments::  Aggravating/difficult ADL's: stairs, sleeping in recliner -tried sleeping in normal bed, walk the dog, going to the gym     ASSESSMENT:   Response to treatment:   Pt has been seen for a total of 11 of 12 approved visits of skilled physical therapy. Pt demonstrating difficulties with lateral walking and anti-rotation L>R; SPV provided for resisted walking with one sport cord. Pt reporting shin pain is improving, therefore good response to edu to decrease WB/loading. Will continue to progress pt as tolerated. Has one more approved visit; currently awaiting auth at this time.      PLAN/RECOMMENDATIONS:   Plan for treatment: therapy treatment to continue next visit.  Planned interventions for next visit: continue with current treatment.  3x97110 and 1x97112 per visit         "

## 2024-05-14 ENCOUNTER — APPOINTMENT (OUTPATIENT)
Dept: PHYSICAL THERAPY | Facility: MEDICAL CENTER | Age: 39
End: 2024-05-14
Attending: INTERNAL MEDICINE
Payer: MEDICAID

## 2024-05-16 ENCOUNTER — PHYSICAL THERAPY (OUTPATIENT)
Dept: PHYSICAL THERAPY | Facility: MEDICAL CENTER | Age: 39
End: 2024-05-16
Attending: INTERNAL MEDICINE
Payer: MEDICAID

## 2024-05-16 DIAGNOSIS — S32.89XS: ICD-10-CM

## 2024-05-16 NOTE — OP THERAPY DAILY TREATMENT
Outpatient Physical Therapy  DAILY TREATMENT     [facility] Physical Therapy  [address]  [place] [address]  Phone:  [phone]  Fax:  [phone]    Date: 2024    Patient: Vanessa Crenshaw  YOB: 1985  MRN: [id number]     Time Calculation    Start time: 1030  Stop time: 1110 Time Calculation (min): 40 minutes         Chief Complaint: Back Problem and Hip Problem    Visit #: 12    SUBJECTIVE:  Continues to have low back discomfort; is taking 2-3 narcos for pain management. Stiffness is still existing in the AM but improves once she gets moving. Is able to transition from stiff to mobile faster than in prev weeks. Has decreased loading on her LE. Does not have shin pain today. Pt stating she has some difficulty for t/s mobilization on foam roller due to fear of injuring self; is wondering if there is an alternative ex.     OBJECTIVE:  Current objective measures:   Objective:  Prev session:  Improved l/s ROM, corwin into extension    Mini-BESTest:  Anticipatory: 5/6  -Sit to stand: 2  -Rise to toes: 2  -Stand on one le     Reactive postural control: /6  -Compensatory stepping correction -fwd: 2  -Compensatory stepping correction -bwd: 2  -Compensatory stepping correction -lateral: 2    Sensory orientation: 4/6  -Stance feet together, EO, firm: 2  -Stance feet together, EC, foam: 1  -Incline, EC: 1    Dynamic gait: 10/10  -Change in gait speed: 2  -Walk with head turns horizontal: 2  -Walk with pivot turns: 2  -Step over obstacles: 2    -Timed up and go with dual task (3 meter walk) (>10 sec signifies increased fall risk): 2/2  TU sec  Dual task TU sec (Mod score >10% difference compared to initial TUG score)    Total: 25/28 (Less than 21 indicates increased risk of falling)         Therapeutic Exercises (CPT 02228):     1. recumbent bike, x6 min 30 sec, cardiovascular warm up    2. pt education, re: extensions prior to bed  and first thing in am    3. t/s rotations in SL, NT    4. FR sequence: open book, alt GH flexion, SA punches, x10 ea, progression of mobility on FR; discomfort with sequence on FR    5. PPU's, x10, reviewed; discussed adding exhale for self OP as tolerated    6. standing l/s extensions at wall, x10, verbal review    7. seated t/s extensions, x10, verbal review    9. pallof press, 10x ea 3 sec hold, DC-ed; replaced by antirotation walking    10. resisted walking, x10 fwd, bwd, lateral walking; one sport cord; x5 fwd, bwd and lateral walking with 2 cords, most difficutly with lateral walking to L; fatigue; SPV for lateral walking to L; fatigue with 2 sport cords, SBA for lateral and forward walking for safety->independence by end of session; one episode of LOB to R with good stepping reaction    11. antirotation walking, 5x ea 1-2 sec hold; green TB, reviewed; good carryover    12. pt education, re: aquatic pool therapy, HO provided for exercise ideas    13. t/s extensions seated, x10, hep      Therapeutic Exercise Summary: Access Code: [email or web address]  URL: [email or web address]  Date: 04/25/2024  Prepared by: Kelvin Isaac    Exercises  - Sit to Stand  - 2 x daily - 7 x weekly - 1 sets - 10 reps  - Standing Lumbar Extension  - 2-3 x daily - 7 x weekly - 1 sets - 10 reps  - Supine Posterior Pelvic Tilt  - 1 x daily - 7 x weekly - 1-2 sets - 10 reps  - Supine Lower Trunk Rotation  - 1 x daily - 7 x weekly - 1-2 sets - 10 reps  - Supine Bridge  - 1 x daily - 7 x weekly - 1-2 sets - 10 reps - 20-3 sec  hold  - Lateral Lunge Adductor Stretch with Counter Support  - 1-2 x daily - 5-7 x weekly - 1 sets - 20 reps  - Hip Flexor Stretch on Step  - 1 x daily - 5-7 x weekly - 1-2 sets - 20 reps  - Sidelying Thoracic Lumbar Rotation  - 1 x daily - 7 x weekly - 1 sets - 10 reps  - Open Book Chest Stretch on Towel Roll  - 1 x daily - 3-7 x weekly - 1-2 sets - 15 reps  - Modified Ghulam Stretch  - 1-2 x daily - 7 x weekly -  "1-2 sets - 30 sec hold  - Supine Alternating Shoulder Flexion  - 1 x daily - 3-7 x weekly - 1-2 sets - 10 reps  - Supine Scapular Protraction in Flexion with Dumbbells  - 7 x weekly - 1-2 sets - 10-15 reps        Therapeutic Treatments and Modalities:     Therapeutic Treatment and Modalities Summary:       Time-based treatments/modalities:    Physical Therapy Timed Treatment Charges  Therapeutic exercise minutes (CPT 08845): 40 minutes    Pain rating (1-10) before treatment: 0/10 R lower back and buttock; 0/10 shin  Pain rating (1-10) after treatment:  \"stiff\"    Pain Comments::  Aggravating/difficult ADL's: stairs, sleeping in recliner -tried sleeping in normal bed, walk the dog, going to the gym     ASSESSMENT:   Response to treatment:   Pt has been seen for a total of 12 of 12 approved visits of skilled physical therapy. Pt continues to demonstrate difficulties with lateral walking and anti-rotation L>R. Pt demonstrating independence with improvement following incr practice and addition of increased resistance. SPV progressing to pt independence with good stability demonstrated by end of session. Modified t/s extensions due to difficulty with foam roller with good tolerance.     Shin pain is improving, pt has started to include NWB cardio ex such as bicycling and swimming; aquatic exercise HO provided to pt today for additional ideas. Will continue to progress pt as tolerated. Today was last approved visit for this referral. PN submitted; currently awaiting auth approval for additional visits. Pt encouraged to continue with her hep independently at this time.       PLAN/RECOMMENDATIONS:   Plan for treatment: therapy treatment to continue next visit.  Planned interventions for next visit: continue with current treatment.  [id number] and 1x97112 per visit         "

## 2024-05-21 ENCOUNTER — APPOINTMENT (OUTPATIENT)
Dept: PHYSICAL THERAPY | Facility: MEDICAL CENTER | Age: 39
End: 2024-05-21
Attending: INTERNAL MEDICINE
Payer: MEDICAID

## 2024-05-23 ENCOUNTER — APPOINTMENT (OUTPATIENT)
Dept: PHYSICAL THERAPY | Facility: MEDICAL CENTER | Age: 39
End: 2024-05-23
Attending: INTERNAL MEDICINE
Payer: MEDICAID

## 2024-05-28 ENCOUNTER — APPOINTMENT (OUTPATIENT)
Dept: PHYSICAL THERAPY | Facility: MEDICAL CENTER | Age: 39
End: 2024-05-28
Attending: INTERNAL MEDICINE
Payer: MEDICAID

## 2024-05-30 ENCOUNTER — APPOINTMENT (OUTPATIENT)
Dept: PHYSICAL THERAPY | Facility: MEDICAL CENTER | Age: 39
End: 2024-05-30
Attending: INTERNAL MEDICINE
Payer: MEDICAID

## 2024-06-04 ENCOUNTER — APPOINTMENT (OUTPATIENT)
Dept: PHYSICAL THERAPY | Facility: MEDICAL CENTER | Age: 39
End: 2024-06-04
Payer: MEDICAID

## 2024-06-10 ENCOUNTER — APPOINTMENT (OUTPATIENT)
Dept: PHYSICAL THERAPY | Facility: MEDICAL CENTER | Age: 39
End: 2024-06-10
Attending: INTERNAL MEDICINE
Payer: MEDICAID

## 2024-07-08 ENCOUNTER — TELEPHONE (OUTPATIENT)
Dept: PHYSICAL THERAPY | Facility: MEDICAL CENTER | Age: 39
End: 2024-07-08
Payer: MEDICAID

## 2024-07-29 ENCOUNTER — HOSPITAL ENCOUNTER (OUTPATIENT)
Dept: RADIOLOGY | Facility: MEDICAL CENTER | Age: 39
End: 2024-07-29
Attending: ORTHOPAEDIC SURGERY
Payer: MEDICAID

## 2024-07-29 DIAGNOSIS — S32.810A MULTIPLE CLOSED PELVIC FRACTURES WITH DISRUPTION OF PELVIC CIRCLE, INITIAL ENCOUNTER (HCC): ICD-10-CM

## 2024-07-29 PROCEDURE — 72148 MRI LUMBAR SPINE W/O DYE: CPT

## 2024-07-31 PROBLEM — T84.84XA PAINFUL ORTHOPAEDIC HARDWARE (HCC): Status: ACTIVE | Noted: 2024-07-31

## 2024-08-01 NOTE — ASSESSMENT & PLAN NOTE
Crush injury from log.  Trauma Green Transfer Activation from [facility].  
Referring facility imaging with extensive pelvic fractures with associated right greater than left side wall hematoma. Superficial hematoma over left hip. Minimally displaced fractures including right pubic bone with extension into the right superior pubic ramus, bilateral inferior pubic rami, and superior right pubic ramus at the anterior acetabulum without definite intra-articular extension.  Complex fracture involving the S1 vertebral body extending posteriorly to involve the neural foramen with fracture line appearing to extend posteriorly to involve the posterior elements/spinous process.  CTA negative for LLE large vessel injury.  Non-operative management.  Weight bearing status - Touch toe weightbearing LLE. WBAT RLE.  Ernst Sarmiento MD. Orthopedic Surgeon. [facility].  
Trial nonoperative management  Mobilize with PT/OT  Reassess need for surgical intervention pending recs  TTWB DEEPTI Sarmiento MD  Orthopedic Surgery  
no

## 2024-08-05 ENCOUNTER — PHYSICAL THERAPY (OUTPATIENT)
Dept: PHYSICAL THERAPY | Facility: MEDICAL CENTER | Age: 39
End: 2024-08-05
Attending: INTERNAL MEDICINE
Payer: MEDICAID

## 2024-08-05 DIAGNOSIS — S32.89XS: ICD-10-CM

## 2024-08-05 PROCEDURE — 97162 PT EVAL MOD COMPLEX 30 MIN: CPT

## 2024-08-05 ASSESSMENT — ENCOUNTER SYMPTOMS
PAIN SCALE AT HIGHEST: 7
PAIN SCALE: 0
PAIN SCALE AT LOWEST: 0

## 2024-08-05 ASSESSMENT — FIBROSIS 4 INDEX: FIB4 SCORE: 0.63

## 2024-08-05 NOTE — OP THERAPY EVALUATION
"  Outpatient Physical Therapy  INITIAL EVALUATION    [facility] Out[facility] Physical Therapy  [address]  [facility] [address]  Phone:  [phone]  Fax:  [phone]    Date of Evaluation: 08/05/2024    Patient: Vanessa Crenshaw  YOB: 1985  MRN: [id number]     Referring Provider: Vanessa Carlson M.D.  [address] [place] [id number]   Referring Diagnosis Fracture of other parts of pelvis, sequela [S32.89XS]     Time Calculation  Start time: 1032  Stop time: 1118 Time Calculation (min): 46 minutes         Chief Complaint: Back Problem    Visit Diagnoses     ICD-10-CM   1. Fracture of other parts of pelvis, sequela  S32.89XS       Date of onset of impairment: No data found    Subjective:   History of Present Illness:     Mechanism of injury:  Patient is a 38 year old female with a PMH including: Rib fractures and pulmonary contusion, traumatic cephalohematoma, t/s fracture, asthma, anxiety, dilation and curettage (2021). Pt reporting has had chronic R knee issues from 2015 with soreness in the knee. No clear CT in the past. She was seen in PT in the past and is known to this provider.     Pt prev seen to address pelvic ring fracture due to tree falling on patient in [place]. Was seen in PT for rehab March-May 2024 with improvements. Pt prev contemplating hardware removal due to cont'd discomfort and stiffness at the pelvis. Per review of PCP notes, MD reporting \"41 female status post transsacral screw for pelvic injury December of last year. In addition to described sensation of stiffness about her pelvis and lower back she has developed radicular pain down the right leg. She is here today to follow-up with MRI results. Pain continues intermittent rating down the right leg.\" Pt reporting current limitations include: significant R knee pain. Stating she had x ray imaging and knee cortisone shot. Had MRI and reviewed with her primary. The " "stiffness and back pain have improved but are \"still there.\" Stating tolerance has improved. Pt stating she believes she irritated her s/s about a week and a half ago after increasing her activities as she was \"feeling really good.\" Pain is described as being at \"fat deposits\" and at R back more than L. Still doing PT exercises, hasn't gone to gym in about a week due to pain. Denies n/t in the leg. No bowel or bladder changes. She is planning to have the hardware removed; awaiting approval from insurance.       Prior level of function:  Indep at the gym, walking on dog, indep with ADL's  Sleep disturbance:  Interrupted sleep (sitting before bed grossly 1 hour)  Pain:     Current pain ratin    At best pain ratin    At worst pain ratin    Location:  R knee pain; B LBP R>L    Quality: throbbing; occasionally feels like being \"electric\" shocks; dull aching at the back.    Progression:  Improving    Pain Comments::  Aggravating/difficult ADL's: Going to the gym (notices discomfort after the gym-20 min recumbent bike, ab [employer] machine), lifting and carrying heavy items >10-15# such as grocery stores, prolonged sitting, Fatigued at end of day.    Relieving: None; t/s extensions provide relief at low back, prone press-ups to low back     No knee pain with standing and walking but will experience stiffness in the back.   Social Support:     Lives in:  Apartment (stairs)  Diagnostic Tests:     Diagnostic Tests Comments:  L/s MRI 24:  At the T12-L1 level, there is no disc height loss, disc herniation, canal stenosis, or foraminal narrowing.     At the L1-L2 level, there is no disc height loss, disc herniation, canal stenosis, or foraminal narrowing.     At the L2-L3 level, there is no disc loss, disc herniation, canal stenosis, or foraminal narrowing.     At the L3-L4 level, there is mild disc height loss and loss of disc T2 hyperintensity. There is a small central annular fissure and disc protrusion. " This is similar to the prior exam with mild canal narrowing and mild bilateral foraminal narrowing.     At the L4-L5 level, there is disc height loss with mild loss of T2 hyperintensity. There is a diffuse disc bulge with a small central annular fissure. There is mild canal stenosis and moderate bilateral foraminal narrowing.     At the L5-S1 level, there is disc height loss and loss of T2 hyperintensity. There is a central disc protrusion extending to the left. There is mild canal stenosis and mild-to-moderate bilateral foraminal narrowing.     The visualized intra-abdominal structures are unremarkable.     IMPRESSION:        1. Multilevel degenerative disease as detailed above.  2. Small central annular fissures are noted at L3-4 and L4-5.  3. There is no significant canal stenosis.  4. Mild to moderate bilateral foraminal narrowing as detailed above.      Treatments:     Treatment Comments:  Surgery -pelvic hardware  Pain medications  Blood thinners for DVT  Knee cortisone shot   Activities of Daily Living:     Patient reported ADL status: Patient's current daily routine includes:  Work: Is currently not working; will start school at the end of August  Hobbies: going to gym  Exercise: Walking outdoors with dog, PT exercises, nerve glides 3x/week (t/s extensions, PPU's, nerve glides, t/s rotations and alternating Ue's)  Uses walking stick    Preparing meals with standing, some meal prep, laundry, shower bench at home,   Getting assistance with walking dog.       Patient Goals:     Patient goals for therapy:  [place] with ADLs/IADLs and decreased pain    Other patient goals:  Returning to gym      Past Medical History:   Diagnosis Date    Asthma     Dental disorder     upper denture    Psychiatric problem     anxiety     Past Surgical History:   Procedure Laterality Date    PB PERCUT FIX PBOX/NECK FEMUR FX N/A 12/28/2023    Procedure: FIXATION, HIP, USING CANNULATED SCREW - SACROILIAC SCREW PELVIS;  Surgeon:  Ernst Sarmiento M.D.;  Location: SURGERY [facility];  Service: Orthopedics    HYSTEROSCOPY WITH MYOSURE  4/21/2021    Procedure: HYSTEROSCOPY;  Surgeon: Perla Rehman M.D.;  Location: SURGERY SAME DAY [facility];  Service: Gynecology    DILATION AND CURETTAGE  4/21/2021    Procedure: DILATION AND CURETTAGE.;  Surgeon: Perla Rehman M.D.;  Location: SURGERY SAME DAY [facility];  Service: Gynecology    ABDOMINAL EXPLORATION       Social History     Tobacco Use    Smoking status: Every Day     Current packs/day: 1.00     Types: Cigarettes    Smokeless tobacco: Never   Substance Use Topics    Alcohol use: Yes     Comment: 6 week     Family and Occupational History     Socioeconomic History    Marital status: Single     Spouse name: Not on file    Number of children: Not on file    Years of education: Not on file    Highest education level: Not on file   Occupational History    Not on file       Objective     Postural Observations  Seated posture: fair    Additional Postural Observation Details  Forward head and rounded shoulders     Hip Screen   Hip range of motion within functional limits with the following exceptions: HS length WFL B  Hip IR/ER WFL (tested in 90/90 position)   Hip strength within functional limits with the following exceptions: Bridge: Full ROM  SL bridge: pelvic tilt B     Neurological Testing     Myotome testing   Lumbar (left)   L1 (hip flexors): 5  L2 (hip flexors): 5  L3 (knee extensors): 5  L4 (ankle dorsiflexors): 5  L5 (great toe extension): 4+  S1 (ankle plantar flexors): 5    Lumbar (right)   All right lumbar myotomes within normal limits    Dermatome testing   Lumbar (left)   All left lumbar dermatomes intact    Lumbar (right)   All right lumbar dermatomes intact    Palpation     Additional Palpation Details  TTP at l/s paraspinals and QL B.    Active Range of Motion     Lumbar   Flexion: within functional limits (FT to ankles)  Extension: within functional limits  Left lateral  flexion: within functional limits  Right lateral flexion: within functional limits  Left rotation: within functional limits  Right rotation: within functional limits    Tests     Additional Tests Details  Pos for neural tension at BLE's via slump testing L>R        Therapeutic Exercises (CPT 60123):     1. pt education, re: PT exam findings and upcoming POC    2. new hep as below    3. pt education, re: increase frequency of nerve glides      Time-based treatments/modalities:           Assessment, Response and Plan:   Impairments: abnormal gait, abnormal or restricted ROM, activity intolerance, hypersensitivity, impaired physical strength and lacks appropriate home exercise program    Assessment details:  Patient is a pleasant and cooperative 38 year old female who is known to this provider. Seen in the past for rehab March-May 2024 following hardware placement for pelvic ring fracture due to crush injury in Dec 2023. She is currently awaiting auth from insurance to remove her pelvic hardware. Has current complaints of cont'd discomfort and stiffness at B LB R>L and increased R knee pain, describes as being electrocuted. She is continuing some of the activities from prev PT including stretching and some intermit nerve glides but decreased compliance to core strengthening and stopped gym workouts secondary to pain. Exam findings suggestive of pos nerve tension, weakness at core and prox pelvic girdle, TTP at l/s paraspinals and QL B. Negative slump testing but uncertain whether lateral LLE discomfort is referring from l/s due to recent incr in sitting or from injury alone; will continue to monitor in sessions. Pt may benefit from skilled physical therapy in order to address above impairments in order to improve QOL and return to reported ADL's.     Other barriers to therapy:  May require injection or hardware removal in addition to PT services  Prognosis comment:  Good/fair  Goals:   Short Term Goals:   1. Pt will be  independent with written HEP.  2. Pt will demonstrate compliance to nerve glides at 2-3x/day.       Short term goal time span:  2-4 weeks      Long Term Goals:    1. Pt will be independent with written HEP.  2. Pt a sig improvement in RMQ score (eval: 33.33).  3. Pt will be able to return to gym at least 3x/wk with strengthening routine with min modifications if any to improve QOL.  4. Pt will have 80% or greater improvement in peripheral s/s for indep pain management.      Long term goal time span:  2-4 months    Plan:   Therapy options:  Physical therapy treatment to continue  Planned therapy interventions:  Neuromuscular Re-education (CPT 98019) and Therapeutic Exercise (CPT 43159)  Frequency: 1-2x/wk.  Duration in visits:  12  Discussed with:  Patient  Plan details:  UPOC: 10/25/24    3x97110 and 1x97112 per visit     *POC extended to allot for lag in auth time for insurance      Functional Assessment Used  Juan Ramon Roland Low Back Pain and Disability Score: 33.33     Referring provider co-signature:  I have reviewed this plan of care and my co-signature certifies the need for services.    Certification Period: 08/05/2024 to  10/25/24    Physician Signature: ________________________________ Date: ______________

## 2024-08-06 ENCOUNTER — APPOINTMENT (OUTPATIENT)
Dept: ADMISSIONS | Facility: MEDICAL CENTER | Age: 39
End: 2024-08-06
Attending: ORTHOPAEDIC SURGERY
Payer: MEDICAID

## 2024-08-07 ENCOUNTER — HOSPITAL ENCOUNTER (OUTPATIENT)
Facility: MEDICAL CENTER | Age: 39
End: 2024-08-07
Attending: ORTHOPAEDIC SURGERY | Admitting: ORTHOPAEDIC SURGERY
Payer: MEDICAID

## 2024-08-07 ENCOUNTER — ANESTHESIA (OUTPATIENT)
Dept: SURGERY | Facility: MEDICAL CENTER | Age: 39
End: 2024-08-07
Payer: MEDICAID

## 2024-08-07 ENCOUNTER — APPOINTMENT (OUTPATIENT)
Dept: RADIOLOGY | Facility: MEDICAL CENTER | Age: 39
End: 2024-08-07
Attending: ORTHOPAEDIC SURGERY
Payer: MEDICAID

## 2024-08-07 ENCOUNTER — ANESTHESIA EVENT (OUTPATIENT)
Dept: SURGERY | Facility: MEDICAL CENTER | Age: 39
End: 2024-08-07
Payer: MEDICAID

## 2024-08-07 VITALS
HEART RATE: 67 BPM | WEIGHT: 235.89 LBS | RESPIRATION RATE: 74 BRPM | BODY MASS INDEX: 35.75 KG/M2 | OXYGEN SATURATION: 91 % | SYSTOLIC BLOOD PRESSURE: 122 MMHG | TEMPERATURE: 96.8 F | DIASTOLIC BLOOD PRESSURE: 74 MMHG | HEIGHT: 68 IN

## 2024-08-07 LAB — HCG UR QL: NEGATIVE

## 2024-08-07 PROCEDURE — A9270 NON-COVERED ITEM OR SERVICE: HCPCS | Mod: UD | Performed by: STUDENT IN AN ORGANIZED HEALTH CARE EDUCATION/TRAINING PROGRAM

## 2024-08-07 PROCEDURE — 20680 REMOVAL OF IMPLANT DEEP: CPT | Mod: 80ROC | Performed by: STUDENT IN AN ORGANIZED HEALTH CARE EDUCATION/TRAINING PROGRAM

## 2024-08-07 PROCEDURE — 20680 REMOVAL OF IMPLANT DEEP: CPT | Performed by: ORTHOPAEDIC SURGERY

## 2024-08-07 PROCEDURE — 160002 HCHG RECOVERY MINUTES (STAT): Performed by: ORTHOPAEDIC SURGERY

## 2024-08-07 PROCEDURE — 700105 HCHG RX REV CODE 258: Mod: UD | Performed by: STUDENT IN AN ORGANIZED HEALTH CARE EDUCATION/TRAINING PROGRAM

## 2024-08-07 PROCEDURE — 160046 HCHG PACU - 1ST 60 MINS PHASE II: Performed by: ORTHOPAEDIC SURGERY

## 2024-08-07 PROCEDURE — 160025 RECOVERY II MINUTES (STATS): Performed by: ORTHOPAEDIC SURGERY

## 2024-08-07 PROCEDURE — 160035 HCHG PACU - 1ST 60 MINS PHASE I: Performed by: ORTHOPAEDIC SURGERY

## 2024-08-07 PROCEDURE — 700102 HCHG RX REV CODE 250 W/ 637 OVERRIDE(OP): Mod: UD | Performed by: STUDENT IN AN ORGANIZED HEALTH CARE EDUCATION/TRAINING PROGRAM

## 2024-08-07 PROCEDURE — 72170 X-RAY EXAM OF PELVIS: CPT

## 2024-08-07 PROCEDURE — 160009 HCHG ANES TIME/MIN: Performed by: ORTHOPAEDIC SURGERY

## 2024-08-07 PROCEDURE — 160039 HCHG SURGERY MINUTES - EA ADDL 1 MIN LEVEL 3: Performed by: ORTHOPAEDIC SURGERY

## 2024-08-07 PROCEDURE — 81025 URINE PREGNANCY TEST: CPT

## 2024-08-07 PROCEDURE — 160048 HCHG OR STATISTICAL LEVEL 1-5: Performed by: ORTHOPAEDIC SURGERY

## 2024-08-07 PROCEDURE — 700111 HCHG RX REV CODE 636 W/ 250 OVERRIDE (IP): Mod: UD | Performed by: PHYSICIAN ASSISTANT

## 2024-08-07 PROCEDURE — 700111 HCHG RX REV CODE 636 W/ 250 OVERRIDE (IP): Mod: UD | Performed by: STUDENT IN AN ORGANIZED HEALTH CARE EDUCATION/TRAINING PROGRAM

## 2024-08-07 PROCEDURE — 700101 HCHG RX REV CODE 250: Performed by: STUDENT IN AN ORGANIZED HEALTH CARE EDUCATION/TRAINING PROGRAM

## 2024-08-07 PROCEDURE — 160028 HCHG SURGERY MINUTES - 1ST 30 MINS LEVEL 3: Performed by: ORTHOPAEDIC SURGERY

## 2024-08-07 PROCEDURE — 700111 HCHG RX REV CODE 636 W/ 250 OVERRIDE (IP): Mod: JZ,UD | Performed by: STUDENT IN AN ORGANIZED HEALTH CARE EDUCATION/TRAINING PROGRAM

## 2024-08-07 RX ORDER — LABETALOL HYDROCHLORIDE 5 MG/ML
5 INJECTION, SOLUTION INTRAVENOUS
Status: DISCONTINUED | OUTPATIENT
Start: 2024-08-07 | End: 2024-08-07 | Stop reason: HOSPADM

## 2024-08-07 RX ORDER — CEFAZOLIN SODIUM 1 G/3ML
2 INJECTION, POWDER, FOR SOLUTION INTRAMUSCULAR; INTRAVENOUS ONCE
Status: COMPLETED | OUTPATIENT
Start: 2024-08-07 | End: 2024-08-07

## 2024-08-07 RX ORDER — SODIUM CHLORIDE, SODIUM LACTATE, POTASSIUM CHLORIDE, CALCIUM CHLORIDE 600; 310; 30; 20 MG/100ML; MG/100ML; MG/100ML; MG/100ML
INJECTION, SOLUTION INTRAVENOUS
Status: DISCONTINUED | OUTPATIENT
Start: 2024-08-07 | End: 2024-08-07 | Stop reason: SURG

## 2024-08-07 RX ORDER — DIPHENHYDRAMINE HYDROCHLORIDE 50 MG/ML
12.5 INJECTION INTRAMUSCULAR; INTRAVENOUS
Status: DISCONTINUED | OUTPATIENT
Start: 2024-08-07 | End: 2024-08-07 | Stop reason: HOSPADM

## 2024-08-07 RX ORDER — ALBUTEROL SULFATE 5 MG/ML
2.5 SOLUTION RESPIRATORY (INHALATION)
Status: DISCONTINUED | OUTPATIENT
Start: 2024-08-07 | End: 2024-08-07 | Stop reason: HOSPADM

## 2024-08-07 RX ORDER — HYDROMORPHONE HYDROCHLORIDE 2 MG/ML
INJECTION, SOLUTION INTRAMUSCULAR; INTRAVENOUS; SUBCUTANEOUS PRN
Status: DISCONTINUED | OUTPATIENT
Start: 2024-08-07 | End: 2024-08-07 | Stop reason: SURG

## 2024-08-07 RX ORDER — HYDRALAZINE HYDROCHLORIDE 20 MG/ML
5 INJECTION INTRAMUSCULAR; INTRAVENOUS
Status: DISCONTINUED | OUTPATIENT
Start: 2024-08-07 | End: 2024-08-07 | Stop reason: HOSPADM

## 2024-08-07 RX ORDER — MIDAZOLAM HYDROCHLORIDE 1 MG/ML
INJECTION INTRAMUSCULAR; INTRAVENOUS PRN
Status: DISCONTINUED | OUTPATIENT
Start: 2024-08-07 | End: 2024-08-07 | Stop reason: SURG

## 2024-08-07 RX ORDER — OXYCODONE HCL 5 MG/5 ML
5 SOLUTION, ORAL ORAL
Status: COMPLETED | OUTPATIENT
Start: 2024-08-07 | End: 2024-08-07

## 2024-08-07 RX ORDER — EPHEDRINE SULFATE 50 MG/ML
5 INJECTION, SOLUTION INTRAVENOUS
Status: DISCONTINUED | OUTPATIENT
Start: 2024-08-07 | End: 2024-08-07 | Stop reason: HOSPADM

## 2024-08-07 RX ORDER — OXYCODONE HCL 5 MG/5 ML
10 SOLUTION, ORAL ORAL
Status: COMPLETED | OUTPATIENT
Start: 2024-08-07 | End: 2024-08-07

## 2024-08-07 RX ORDER — HYDROMORPHONE HYDROCHLORIDE 1 MG/ML
0.4 INJECTION, SOLUTION INTRAMUSCULAR; INTRAVENOUS; SUBCUTANEOUS
Status: DISCONTINUED | OUTPATIENT
Start: 2024-08-07 | End: 2024-08-07 | Stop reason: HOSPADM

## 2024-08-07 RX ORDER — DEXAMETHASONE SODIUM PHOSPHATE 4 MG/ML
INJECTION, SOLUTION INTRA-ARTICULAR; INTRALESIONAL; INTRAMUSCULAR; INTRAVENOUS; SOFT TISSUE PRN
Status: DISCONTINUED | OUTPATIENT
Start: 2024-08-07 | End: 2024-08-07 | Stop reason: SURG

## 2024-08-07 RX ORDER — HYDROMORPHONE HYDROCHLORIDE 1 MG/ML
0.1 INJECTION, SOLUTION INTRAMUSCULAR; INTRAVENOUS; SUBCUTANEOUS
Status: DISCONTINUED | OUTPATIENT
Start: 2024-08-07 | End: 2024-08-07 | Stop reason: HOSPADM

## 2024-08-07 RX ORDER — CELECOXIB 200 MG/1
400 CAPSULE ORAL ONCE
Status: COMPLETED | OUTPATIENT
Start: 2024-08-07 | End: 2024-08-07

## 2024-08-07 RX ORDER — LIDOCAINE HYDROCHLORIDE 20 MG/ML
INJECTION, SOLUTION EPIDURAL; INFILTRATION; INTRACAUDAL; PERINEURAL PRN
Status: DISCONTINUED | OUTPATIENT
Start: 2024-08-07 | End: 2024-08-07 | Stop reason: SURG

## 2024-08-07 RX ORDER — HALOPERIDOL 5 MG/ML
1 INJECTION INTRAMUSCULAR
Status: DISCONTINUED | OUTPATIENT
Start: 2024-08-07 | End: 2024-08-07 | Stop reason: HOSPADM

## 2024-08-07 RX ORDER — ONDANSETRON 2 MG/ML
INJECTION INTRAMUSCULAR; INTRAVENOUS PRN
Status: DISCONTINUED | OUTPATIENT
Start: 2024-08-07 | End: 2024-08-07 | Stop reason: SURG

## 2024-08-07 RX ORDER — ONDANSETRON 2 MG/ML
4 INJECTION INTRAMUSCULAR; INTRAVENOUS
Status: DISCONTINUED | OUTPATIENT
Start: 2024-08-07 | End: 2024-08-07 | Stop reason: HOSPADM

## 2024-08-07 RX ORDER — HYDROMORPHONE HYDROCHLORIDE 1 MG/ML
0.2 INJECTION, SOLUTION INTRAMUSCULAR; INTRAVENOUS; SUBCUTANEOUS
Status: DISCONTINUED | OUTPATIENT
Start: 2024-08-07 | End: 2024-08-07 | Stop reason: HOSPADM

## 2024-08-07 RX ORDER — ACETAMINOPHEN 500 MG
1000 TABLET ORAL ONCE
Status: COMPLETED | OUTPATIENT
Start: 2024-08-07 | End: 2024-08-07

## 2024-08-07 RX ADMIN — SUGAMMADEX 200 MG: 100 INJECTION, SOLUTION INTRAVENOUS at 13:44

## 2024-08-07 RX ADMIN — MIDAZOLAM HYDROCHLORIDE 2 MG: 2 INJECTION, SOLUTION INTRAMUSCULAR; INTRAVENOUS at 13:32

## 2024-08-07 RX ADMIN — CEFAZOLIN 2 G: 1 INJECTION, POWDER, FOR SOLUTION INTRAMUSCULAR; INTRAVENOUS at 13:44

## 2024-08-07 RX ADMIN — PROPOFOL 200 MG: 10 INJECTION, EMULSION INTRAVENOUS at 13:35

## 2024-08-07 RX ADMIN — FENTANYL CITRATE 50 MCG: 50 INJECTION, SOLUTION INTRAMUSCULAR; INTRAVENOUS at 14:56

## 2024-08-07 RX ADMIN — DEXAMETHASONE SODIUM PHOSPHATE 8 MG: 4 INJECTION INTRA-ARTICULAR; INTRALESIONAL; INTRAMUSCULAR; INTRAVENOUS; SOFT TISSUE at 13:44

## 2024-08-07 RX ADMIN — ACETAMINOPHEN 1000 MG: 500 TABLET ORAL at 12:35

## 2024-08-07 RX ADMIN — ROCURONIUM BROMIDE 30 MG: 10 INJECTION, SOLUTION INTRAVENOUS at 13:37

## 2024-08-07 RX ADMIN — LIDOCAINE HYDROCHLORIDE 100 MG: 20 INJECTION, SOLUTION EPIDURAL; INFILTRATION; INTRACAUDAL at 13:35

## 2024-08-07 RX ADMIN — FENTANYL CITRATE 100 MCG: 50 INJECTION, SOLUTION INTRAMUSCULAR; INTRAVENOUS at 13:34

## 2024-08-07 RX ADMIN — SODIUM CHLORIDE, POTASSIUM CHLORIDE, SODIUM LACTATE AND CALCIUM CHLORIDE: 600; 310; 30; 20 INJECTION, SOLUTION INTRAVENOUS at 13:32

## 2024-08-07 RX ADMIN — PROPOFOL 50 MG: 10 INJECTION, EMULSION INTRAVENOUS at 13:38

## 2024-08-07 RX ADMIN — CELECOXIB 400 MG: 200 CAPSULE ORAL at 12:36

## 2024-08-07 RX ADMIN — HYDROMORPHONE HYDROCHLORIDE 0.5 MG: 2 INJECTION INTRAMUSCULAR; INTRAVENOUS; SUBCUTANEOUS at 13:53

## 2024-08-07 RX ADMIN — ONDANSETRON 4 MG: 2 INJECTION INTRAMUSCULAR; INTRAVENOUS at 13:55

## 2024-08-07 RX ADMIN — OXYCODONE HYDROCHLORIDE 5 MG: 5 SOLUTION ORAL at 14:25

## 2024-08-07 ASSESSMENT — PAIN DESCRIPTION - PAIN TYPE
TYPE: SURGICAL PAIN

## 2024-08-07 ASSESSMENT — FIBROSIS 4 INDEX: FIB4 SCORE: 0.63

## 2024-08-07 NOTE — DISCHARGE INSTRUCTIONS
HOME CARE INSTRUCTIONS    ACTIVITY: Rest and take it easy for the first 24 hours.  A responsible adult is recommended to remain with you during that time.  It is normal to feel sleepy.  We encourage you to not do anything that requires balance, judgment or coordination.    FOR 24 HOURS DO NOT:  Drive, operate machinery or run household appliances.  Drink beer or alcoholic beverages.  Make important decisions or sign legal documents.    SPECIAL INSTRUCTIONS:   Orthopedic Hardware Removal, Care After  This sheet gives you information about how to care for yourself after your procedure. Your health care provider may also give you more specific instructions. If you have problems or questions, contact your health care provider.  What can I expect after the procedure?  After the procedure, it is common to have:  Soreness or pain.  Some swelling in the area where the hardware was removed.  A small amount of blood or clear fluid coming from your incision.  Follow these instructions at home:  If you have a cast:  Do not stick anything inside the cast to scratch your skin. Doing that increases your risk of infection.  Check the skin around the cast every day. Tell your health care provider about any concerns.  You may put lotion on dry skin around the edges of the cast. Do not put lotion on the skin underneath the cast.  Keep the cast clean and dry.  If you have a splint or boot:  Wear the splint or boot as told by your health care provider. Remove it only as told by your health care provider.  Loosen the splint or boot if your fingers or toes tingle, become numb, or turn cold and blue.  Keep the splint or boot clean and dry.  Bathing  Do not take baths, swim, or use a hot tub until your health care provider approves. Ask your health care provider if you may take showers. You may only be allowed to take sponge baths.  Keep the bandage (dressing) dry until your health care provider says it can be removed.  If your cast,  splint, or boot is not waterproof:  Do not let it get wet.  Cover it with a watertight covering when you take a bath or a shower.  Incision care  Follow instructions from your health care provider about how to take care of your incision. Make sure you:  Wash your hands with soap and water before you change your dressing. If soap and water are not available, use hand .  Change your dressing as told by your health care provider.  Leave stitches (sutures), skin glue, or adhesive strips in place. These skin closures may need to stay in place for 2 weeks or longer. If adhesive strip edges start to loosen and curl up, you may trim the loose edges. Do not remove adhesive strips completely unless your health care provider tells you to do that.  Check your incision area every day for signs of infection. Check for:  Redness.  More swelling or pain.  More fluid or blood.  Warmth.  Pus or a bad smell.  Managing pain, stiffness, and swelling  If directed, put ice on the affected area:  If you have a removable splint or boot, remove it as told by your health care provider.  Put ice in a plastic bag.  Place a towel between your skin and the bag.  Leave the ice on for 20 minutes, 2-3 times a day.  Move your fingers or toes often to avoid stiffness and to lessen swelling.  Raise (elevate) the injured area above the level of your heart while you are sitting or lying down.  Driving  Do not drive or use heavy machinery while taking prescription pain medicine.  Do not drive for 24 hours if you were given a medicine to help you relax (sedative) during your procedure.  Ask your health care provider when it is safe to drive if you have a cast, splint, or boot on the affected limb.  Activity  Ask your health care provider what activities are safe for you during recovery, and ask what activities you need to avoid.  Do not use the injured limb to support your body weight until your health care provider says that you can.  Do not play  contact sports until your health care provider approves.  Do exercises as told by your health care provider.  Avoid sitting for a long time without moving. Get up and move around at least every few hours. This will help prevent blood clots.  General instructions  Do not put pressure on any part of the cast or splint until it is fully hardened. This may take several hours.  If you are taking prescription pain medicine, take actions to prevent or treat constipation. Your health care provider may recommend that you:  Drink enough fluid to keep your urine pale yellow.  Eat foods that are high in fiber, such as fresh fruits and vegetables, whole grains, and beans.  Limit foods that are high in fat and processed sugars, such as fried or sweet foods.  Take an over-the-counter or prescription medicine for constipation.  Do not use any products that contain nicotine or tobacco, such as cigarettes and e-cigarettes. These can delay bone healing after surgery. If you need help quitting, ask your health care provider.  Take over-the-counter and prescription medicines only as told by your health care provider.  Keep all follow-up visits as told by your health care provider. This is important.  Contact a health care provider if:  You have lasting pain.  You have redness around your incision.  You have more swelling or pain around your incision.  You have more fluid or blood coming from your incision.  Your incision feels warm to the touch.  You have pus or a bad smell coming from your incision.  You are unable to do exercises or physical activity as told by your health care provider.  Get help right away if:  You have difficulty breathing.  You have chest pain.  You have severe pain.  You have a fever or chills.  You have numbness for more than 24 hours in the area where the hardware was removed.  Summary  After the procedure, it is common to have some pain and swelling in the area where the hardware was removed.  Follow  instructions from your health care provider about how to take care of your incision.  Return to your normal activities as told by your health care provider. Ask your health care provider what activities are safe for you.    DIET: To avoid nausea, slowly advance diet as tolerated, avoiding spicy or greasy foods for the first day.  Add more substantial food to your diet according to your physician's instructions.  Babies can be fed formula or breast milk as soon as they are hungry.  INCREASE FLUIDS AND FIBER TO AVOID CONSTIPATION.    SURGICAL DRESSING/BATHING: keep dressing clean dry and intact. May shower after 48 hours no submersion .     MEDICATIONS: Resume taking daily medication.  Take prescribed pain medication with food.  If no medication is prescribed, you may take non-aspirin pain medication if needed.  PAIN MEDICATION CAN BE VERY CONSTIPATING.  Take a stool softener or laxative such as senokot, pericolace, or milk of magnesia if needed.    Prescription given fo.  Given Tylenol 1 gram , celebrex 200mg at 12:35PM , Oxycodone at 14:25H .    A follow-up appointment should be arranged with your doctor in [phone] ; call to schedule.    You should CALL YOUR PHYSICIAN if you develop:  Fever greater than 101 degrees F.  Pain not relieved by medication, or persistent nausea or vomiting.  Excessive bleeding (blood soaking through dressing) or unexpected drainage from the wound.  Extreme redness or swelling around the incision site, drainage of pus or foul smelling drainage.  Inability to urinate or empty your bladder within 8 hours.  Problems with breathing or chest pain.    You should call 911 if you develop problems with breathing or chest pain.  If you are unable to contact your doctor or surgical center, you should go to the nearest emergency room or urgent care center.  Physician's telephone #: [phone]     MILD FLU-LIKE SYMPTOMS ARE NORMAL.  YOU MAY EXPERIENCE GENERALIZED MUSCLE ACHES, THROAT IRRITATION,  HEADACHE AND/OR SOME NAUSEA.    If any questions arise, call your doctor.  If your doctor is not available, please feel free to call the Surgical Center at [phone].  The Center is open Monday through Friday from 7AM to 7PM.      A registered nurse may call you a few days after your surgery to see how you are doing after your procedure.    You may also receive a survey in the mail within the next two weeks and we ask that you take a few moments to complete the survey and return it to us.  Our goal is to provide you with very good care and we value your comments.     Depression / Suicide Risk    As you are discharged from this [facility] facility, it is important to learn how to keep safe from harming yourself.    Recognize the warning signs:  Abrupt changes in personality, positive or negative- including increase in energy   Giving away possessions  Change in eating patterns- significant weight changes-  positive or negative  Change in sleeping patterns- unable to sleep or sleeping all the time   Unwillingness or inability to communicate  Depression  Unusual sadness, discouragement and loneliness  Talk of wanting to die  Neglect of personal appearance   Rebelliousness- reckless behavior  Withdrawal from people/activities they love  Confusion- inability to concentrate     If you or a loved one observes any of these behaviors or has concerns about self-harm, here's what you can do:  Talk about it- your feelings and reasons for harming yourself  Remove any means that you might use to hurt yourself (examples: pills, rope, extension cords, firearm)  Get professional help from the community (Mental Health, Substance Abuse, psychological counseling)  Do not be alone:Call your Safe Contact- someone whom you trust who will be there for you.  Call your local CRISIS HOTLINE 294-9848 or 424-907-5915  Call your local Children's Mobile Crisis Response Team [place] (407) 412-0458 or www."[employer]  Call the  toll free National Suicide Prevention Hotlines   National Suicide Prevention Lifeline 570-963-GQXP (0954)  [facility] Line Network 800-SUICIDE (479-1735)    I acknowledge receipt and understanding of these Home Care instructions.

## 2024-08-07 NOTE — ANESTHESIA TIME REPORT
Anesthesia Start and Stop Event Times       Date Time Event    8/7/2024 1332 Anesthesia Start     1417 Anesthesia Stop          Responsible Staff  08/07/24      Name Role Begin End    Walter Mcdowell M.D. Anesth 1332 1417          Overtime Reason:  no overtime (within assigned shift)    Comments:

## 2024-08-07 NOTE — H&P
8/7/2024    HPI: Vanessa Crenshaw is a 38 y.o. female who presents with complaints of pain to pelvis.  This started 8 months ago after SI screw.  The pain is 3/10 and is described as sharp.  The pain is made worse by palpation of the area and made better by rest and immobilization.She is here for screw removal    Past Medical History:   Diagnosis Date    Asthma     Blood clotting disorder ([facility])     H/O DVT    Bulging lumbar disc 08/06/2024    Dental disorder     upper denture    DVT (deep venous thrombosis) ([facility]) 01/2024    Left Leg    Injury     Pain 08/06/2024    Pain since  surgery per patient.    Psychiatric problem     anxiety    Retained metal fragments     Under care of pain management specialist 08/06/2024    [facility]       Past Surgical History:   Procedure Laterality Date    PB PERCUT FIX PBOX/NECK FEMUR FX N/A 12/28/2023    Procedure: FIXATION, HIP, USING CANNULATED SCREW - SACROILIAC SCREW PELVIS;  Surgeon: Ernst Sarmiento M.D.;  Location: SURGERY [facility];  Service: Orthopedics    HYSTEROSCOPY WITH MYOSURE  04/21/2021    Procedure: HYSTEROSCOPY;  Surgeon: Perla Rehman M.D.;  Location: SURGERY SAME DAY [facility];  Service: Gynecology    DILATION AND CURETTAGE  04/21/2021    Procedure: DILATION AND CURETTAGE.;  Surgeon: Perla Rehman M.D.;  Location: SURGERY SAME DAY [facility];  Service: Gynecology    ABDOMINAL EXPLORATION      OPEN REDUCTION      ORIF, HIP         Medications  No current facility-administered medications on file prior to encounter.     Current Outpatient Medications on File Prior to Encounter   Medication Sig Dispense Refill    acetaminophen (TYLENOL) 500 MG Tab Take 2 Tablets by mouth every 6 hours as needed for Mild Pain. (Patient taking differently: Take 500 mg by mouth as needed for Mild Pain.     MEDICATION INSTRUCTIONS FOR SURGERY ON 8/7/2024:MAY TAKE DAY OF SURGERY, IF NEEDED.) 30 Tablet 0    fluticasone (FLONASE) 50 MCG/ACT  "nasal spray Administer 2 Sprays into affected nostril(S) 1 time a day as needed (Allergies).     MEDICATION INSTRUCTIONS FOR SURGERY ON 8/7/2024:MAY TAKE DAY OF SURGERY, IF NEEDED.         Allergies  Molds & smuts, Seasonal, and Other misc    ROS  Pelvic pain. All other systems were reviewed and found to be negative    Family History   Problem Relation Age of Onset    Diabetes Mother     Hypertension Father     Heart Disease Father        Social History     Socioeconomic History    Marital status: Single   Tobacco Use    Smoking status: Every Day     Current packs/day: 1.00     Average packs/day: 1 pack/day for 23.8 years (23.8 ttl pk-yrs)     Types: Cigarettes     Start date: 10/5/2000    Smokeless tobacco: Never   Vaping Use    Vaping status: Never Used   Substance and Sexual Activity    Alcohol use: Yes     Alcohol/week: 0.0 - 3.6 oz    Drug use: Not Currently     Types: Marijuana, Inhaled     Comment: meth hx, marajuana occassionally on Sunday 4/18    Sexual activity: Yes       Physical Exam  Vitals  BP (!) 142/73   Pulse 79   Temp 36.6 °C (97.9 °F) (Temporal)   Resp 16   Ht 1.727 m (5' 8\")   Wt 107 kg (235 lb 14.3 oz)   SpO2 96%   General: Well Developed, Well Nourished, Age appropriate appearance  HEENT: Normocephalic, atraumatic  Psych: Normal mood and affect  Neck: Supple, nontender, no masses  Lungs: Breathing unlabored, No audible wheezing  Heart: Regular heart rate and rhythm  Abdomen: Soft, NT, ND  Neuro: Sensation grossly intact to BUE and BLE, moving all four extremities  Skin: Intact, no open wounds  Vascular: 2+DP/PT, Capillary refill <2 seconds  MSK: LLE      Radiographs:  SI screw in place  DX-PORTABLE FLUOROSCOPY < 1 HOUR Reason For Exam: Main OR    (Results Pending)   DX-PELVIS-1 OR 2 VIEWS    (Results Pending)       Laboratory Values      No results for input(s): \"SODIUM\", \"POTASSIUM\", \"CHLORIDE\", \"CO2\", \"GLUCOSE\", \"BUN\", \"CPKTOTAL\" in the last 72 hours.          Impression:Painful left SI " screw    Plan:We discussed the diagnosis and findings with the patient at length.  We reviewed possible non operative and operative interventions and the risks and benefits of each of these.  she had a chance to ask questions and all of these were answered to her satisfaction. The patient chose to proceed with  hardware removal including all indicated procedures. Risks and benefits of surgery were discussed which include but are not limited to bleeding, infection, neurovascular damage, malunion, nonunion , DVT, PE, MI, Stroke and death. They understand these risks and wish to proceed.

## 2024-08-07 NOTE — OP REPORT
DATE OF OPERATION: 8/7/2024     PREOPERATIVE DIAGNOSIS:  1. Left pelvis painful hardware    POSTOPERATIVE DIAGNOSIS: Same    PROCEDURE PERFORMED:  1. Hardware removal left TITS screw    SURGEON: Ernst Sarmiento M.D.     ASSISTANT: Vicente Carrasco    ANESTHESIA: General    ESTIMATED BLOOD LOSS: 0 mL    INDICATIONS: The patient is a 38 y.o. female with painful pelvis  hardware following operative repair.  The patient has had pain not resolved by conservative measures and was found to have a normal neurovascular exam and skin envelope.  Given these findings, hardware removal was indicated.  I discussed the risks and benefits of the procedure, including the risks of infection, wound healing complication, neurovascular injury, pain, malunion, non-union, malrotation, and the medical risks of anesthesia including DVT, PE, MI, stroke, and death.  Benefits include reduced pain and hardware complications in the future. Alternatives to surgery were also discussed, including non-operative management.  The patient signed the informed consent and the operative extremity was marked.        PROCEDURE:  The patient underwent anesthesia, and was positioned supine on a radiolucent table and all bony prominences were well padded.  Preoperative antibiotics were administered. Sequential compression devices were employed. The correct operative site was prepped and draped into a sterile field. A procedural pause was conducted to verify correct patient, correct extremity, presence of the surgeons initials on the operative extremity.     The TITS screw was removed without difficulty utilizing previous incision. Wound was irrigated with normal saline, and closed in layers, with  2-0 Vicryl in the subcutaneous tissue, and staples in the skin. Sterile dressings were applied.      The patient tolerated the procedure well. There were no apparent complications. All sponge, needle, and instrument counts were correct on two separate occasions.  They were awakened, extubated, and transferred to the recovery room in satisfactory condition.      The use of Vicente Carrasco as a surgical assistant was necessary for assistance with exposure, retraction,  and closure.     Post-Operative Plan:     1.  The patient should remain full weightbearing on their operative extremity.  Gait aids (crutch or crutches, cane, walker) may be used as needed, and may be discontinued when no longer required.  2.  IV antibiotics - may be continued for 24 hours, but are not required.  3.  Discharge planning   ____________________________________   Ernst Sarmiento M.D.   DD: 8/7/2024  2:24 PM

## 2024-08-07 NOTE — ANESTHESIA POSTPROCEDURE EVALUATION
Patient: Vanessa Gillilandom    Procedure Summary       Date: 08/07/24 Room / Location: [facility] 11 / SURGERY [facility]    Anesthesia Start: 1332 Anesthesia Stop: 1417    Procedure: HARWARE REMOVAL LEFT SACROILIAC PELVIS SCREWS (Pelvis) Diagnosis:       Painful orthopaedic hardware (HCC)      (Painful orthopaedic hardware (HCC) [T84.84XA])    Surgeons: Ernst Sarmiento M.D. Responsible Provider: Walter Mcdowell M.D.    Anesthesia Type: general ASA Status: 1            Final Anesthesia Type: general  Last vitals  BP   Blood Pressure: 122/74    Temp   36 °C (96.8 °F)    Pulse   67   Resp   17   SpO2   91 %      Anesthesia Post Evaluation    Patient location during evaluation: PACU  Patient participation: complete - patient participated  Level of consciousness: awake and alert    Airway patency: patent  Anesthetic complications: no  Cardiovascular status: hemodynamically stable  Respiratory status: acceptable  Hydration status: euvolemic    PONV: none          No notable events documented.     Nurse Pain Score: 2 (NPRS)

## 2024-08-07 NOTE — LETTER
August 6, 2024    Patient Name: Vanessa Crenshaw  Surgeon Name: Ernst Sarmiento M.D.  Surgery Facility: [facility] ([address] [facility])  Surgery Date: 8/7/2024    The time of your surgery is not final and may change up to and until the day of your surgery. You will be contacted 24-48 hours prior to your surgery date with your check-in and surgery time.    If you will not be at one of the below numbers please call the surgery scheduler at [phone]  Preferred Phone: [phone]    BEFORE YOUR SURGERY   Pre Registration and/or Lab Work must be done within and no earlier than 28 days prior to your surgery date. Your scheduled facility will contact you regarding all required preregistration and/or lab work. If you have not been contacted within 7 days of your scheduled procedure please call [facility] at [phone] for an appointment as soon as possible.    DAY OF YOUR SURGERY  Nothing to eat or drink after midnight     Refrain from smoking any substance after midnight prior to surgery. Smoking may interfere with the anesthetic and frequently produces nausea during the recovery period.    Continue taking all lifesaving medications. Including the morning of your surgery with small sip of water.    Please do NOT take on the day of surgery:  Diuretics: examples- furosemide (Lasix), spironolactone, hydrochlorothiazide  ACE-inhibitors: examples- lisinopril, ramipril, enalapril  “ARBs”: examples- losartan, Olmesartan, valsartan    Please arrive at the hospital/surgery center at the check-in time provided.     An adult will need to bring you and take you home after your surgery.     AFTER YOUR SURGERY  Post op Appointment:   Date: 8.16.24   Time: 11:00 AM   With: Joe Alston PA-C   Location: [address] [place] [id number]    - Post Surgery - You will need someone to drive you home  - Post Surgery - You will need someone to stay with you for 24 hours     TIME OFF  WORK  FMLA or Disability forms can be faxed directly to: [phone] or you may drop them off at [address] [place] [address]. Our office charges a $35.00 fee per form. Forms will be completed within 10 business days of drop off and payment received. For the status of your forms you may contact our disability office directly [phone].    MEDICATION INSTRUCTIONS **Please read section completely**  The following medications should be stopped a minimum of 10 days prior to surgery:  All over the counter, Supplements & Herbal medications    Anorectics: Phentermine (Adipex-P, Lomaira and Suprenza), Phentermine-topiramate (Qsymia), Bupropion-naltrexone (Contrave)    **If you are on Bupropion for anxiety/depression, please continue this medication up until the day of surgery.     Opiod Partial Agonists/Opioid Antagonists: Buprenorphine (Suboxone, Belbuca, Butrans, Probuphine Implant, Sublocade), Naltrexone (ReVia, Vivitrol), Naloxone    Amphetamines: Dextroamphetamine/Amphetamine (Adderall, Mydayis), Methylphenidate Hydrochloride (Concerta, Metadate, Methylin, Ritalin)    The following medications should be stopped 5 days prior to surgery:  Blood Thinners: Any Aspirin, Aspirin products, anti-inflammatories such as ibuprofen and any blood thinners such as Coumadin and Plavix. Please consult your prescribing physician if you are on life saving blood thinners, in regards to when to stop medications prior to surgery.     The following medications should be stopped a minimum of 3 days prior to surgery:  PDE-5 inhibitors: Sildenafil (Viagra), Tadalafil (Cialis), Vardenafil (Levitra), Avanafil (Stendra)    MAO Inhibitors: Rasagiline (Azilect), Selegiline (Eldepryl, Emsam, Selapar), Isocarboxazid (Marplan), Phenelzine (Nardil)

## 2024-08-07 NOTE — ANESTHESIA PREPROCEDURE EVALUATION
Case: [id number] Date/Time: 08/07/24 1230    Procedure: HARWARE REMOVAL LEFT SACROILIAC PELVIS SCREWS    Diagnosis: Painful orthopaedic hardware (HCC) [T84.84XA]    Pre-op diagnosis: Painful orthopaedic hardware (HCC) [T84.84XA]    Location: [place] [address] / SURGERY [facility]    Surgeons: Ernst Sarmiento M.D.            Relevant Problems   ANESTHESIA (within normal limits)      PULMONARY (within normal limits)      NEURO (within normal limits)      CARDIAC (within normal limits)      GI (within normal limits)       (within normal limits)      ENDO (within normal limits)      OB (within normal limits)       Physical Exam    Airway   Mallampati: II  TM distance: >3 FB  Neck ROM: full       Cardiovascular - normal exam  Rhythm: regular  Rate: normal     Dental - normal exam           Pulmonary - normal exam  Breath sounds clear to auscultation     Abdominal    Neurological - normal exam                   Anesthesia Plan    ASA 1       Plan - general       Airway plan will be ETT          Induction: intravenous    Postoperative Plan: Postoperative administration of opioids is intended.    Pertinent diagnostic labs and testing reviewed    Informed Consent:    Anesthetic plan and risks discussed with patient.    Use of blood products discussed with: patient whom consented to blood products.

## 2024-08-07 NOTE — PROGRESS NOTES
Pharmacy Medication Reconciliation      ~Medication reconciliation updated and complete per patient   ~Allergies have been verified and updated   ~No oral ABX within the last 30 days  ~Patient home pharmacy :  [facility]  [phone]      ~Anticoagulants (rivaroxaban, apixaban, edoxaban, dabigatran, warfarin, enoxaparin) taken in the last 14 days? NO

## 2024-08-07 NOTE — ANESTHESIA PROCEDURE NOTES
Airway    Date/Time: 8/7/2024 1:39 PM    Performed by: Walter Mcdowell M.D.  Authorized by: Walter Mcdowell M.D.    Location:  OR  Urgency:  Elective  Indications for Airway Management:  Anesthesia      Spontaneous Ventilation: absent    Sedation Level:  Deep  Preoxygenated: Yes    Patient Position:  Sniffing  Final Airway Type:  Endotracheal airway  Final Endotracheal Airway:  ETT  Cuffed: Yes    Technique Used for Successful ETT Placement:  Direct laryngoscopy    Insertion Site:  Oral  Blade Type:  Mendez  Laryngoscope Blade/Videolaryngoscope Blade Size:  3  ETT Size (mm):  6.5  Measured from:  Lips  ETT to Lips (cm):  20  Placement Verified by: auscultation and capnometry    Cormack-Lehane Classification:  Grade I - full view of glottis  Number of Attempts at Approach:  1  Ventilation Between Attempts:  BVM  Number of Other Approaches Attempted:  1  Unsuccessful Airway(s) Attempted:  SGA   Attempted size 4 LMA prior to paralysis and intubation.  Met significant resistance in hypopharynx with small amount of bleeding.  Suctioned and intubated subsequently

## 2024-08-07 NOTE — OR NURSING
1416  Patient arrived from OR. Maintaining own airway, responding to voice. Received report from Dr. Greenwood and Man WOODY RN, assumed care. VSS. Left hip dressing CDI. Ice pack applied to surgical site. No s/s of pain or nausea noted. Orders reviewed and implemented.  1420 Alert, oriented x 4. Denies nausea. Tolerating ice chips.  1425 Oxycodone administered for reported pain. 1456 Fentanyl administered for continued report of pain.  1506 Family updated.  1510 Patient reports pain improved and tolerable. VSS. Surgical dressing remains CDI. Meeting criteria for Phase II recovery.  1522 Report to Trisha WOOD RN. 1528 Patient discharged to Phase II.  1530 Arrived to Phase II, Jo GOODRICH present to assume care.

## 2024-08-27 ENCOUNTER — APPOINTMENT (OUTPATIENT)
Dept: PHYSICAL THERAPY | Facility: MEDICAL CENTER | Age: 39
End: 2024-08-27
Attending: INTERNAL MEDICINE
Payer: MEDICAID

## 2024-08-29 ENCOUNTER — APPOINTMENT (OUTPATIENT)
Dept: PHYSICAL THERAPY | Facility: MEDICAL CENTER | Age: 39
End: 2024-08-29
Attending: INTERNAL MEDICINE
Payer: MEDICAID

## 2024-09-05 ENCOUNTER — PHYSICAL THERAPY (OUTPATIENT)
Dept: PHYSICAL THERAPY | Facility: MEDICAL CENTER | Age: 39
End: 2024-09-05
Attending: INTERNAL MEDICINE
Payer: MEDICAID

## 2024-09-05 DIAGNOSIS — S32.89XS: ICD-10-CM

## 2024-09-05 DIAGNOSIS — M25.561 RIGHT KNEE PAIN, UNSPECIFIED CHRONICITY: ICD-10-CM

## 2024-09-05 PROCEDURE — 97110 THERAPEUTIC EXERCISES: CPT

## 2024-09-05 NOTE — OP THERAPY DAILY TREATMENT
Outpatient Physical Therapy  DAILY TREATMENT     [facility]patient Physical Therapy  [address]  [place] [address]  Phone:  [phone]  Fax:  [phone]    Date: 09/05/2024    Patient: Vanessa Crenshaw  YOB: 1985  MRN: [id number]     Time Calculation    Start time: 0901  Stop time: 0943 Time Calculation (min): 42 minutes         Chief Complaint: Back Problem    Visit #:2    SUBJECTIVE:  Pt returns for follow up; stating she had instant relief when the screw was removed.       OBJECTIVE:  Current objective measures:   Juan Ramon Roland Low Back Pain and Disability Score: 12.5     See PN     Therapeutic Exercises (CPT 38717):     2. new hep as below    3. pt education, re: increase frequency of nerve glides, reviewed    4. review of hep and goals    5. PPU's, x10, hypomobile at l/s    6. SL t/s rotations, x10, restricted to R>Lobjective measures    7. 1/2 kneeling nerve glides knee flexed and then extended R, x10, hep; HO provided    Therapeutic Treatments and Modalities:     1. Neuromuscular Re-education (CPT 23910), x5 min, PA and AP mobs gr III to prox and distal tibfib joints RLE    Time-based treatments/modalities:    Physical Therapy Timed Treatment Charges  Neuromusc re-ed, balance, coor, post minutes (CPT 60863): 5 minutes  Therapeutic exercise minutes (CPT 58939): 37 minutes      Pain rating (1-10) before treatment:  0/10 back, 4/10 slight infrapatellar discomfort  Pain rating (1-10) after treatment:  0/10    ASSESSMENT:   Response to treatment:   See PN       PLAN/RECOMMENDATIONS:   Plan for treatment: therapy treatment to continue next visit.  Planned interventions for next visit: continue with current treatment.  3x97110 and 1x97112 per visit

## 2024-09-05 NOTE — OP THERAPY PROGRESS SUMMARY
"  Outpatient Physical Therapy  PROGRESS SUMMARY NOTE      [facility]  [address]  [facility] [address]  Phone:  [phone]  Fax:  [phone]    Date of Visit: 09/05/2024    Patient: Vanessa Crensahw  YOB: 1985  MRN: [id number]     Referring Provider: Vanessa Carlson M.D.  [address] [place] [id number]   Referring Diagnosis Fracture of other parts of pelvis, sequela [S32.89XS]     Visit Diagnoses     ICD-10-CM   1. Fracture of other parts of pelvis, sequela  S32.89XS   2. Right knee pain, unspecified chronicity  M25.561       Rehab Potential: good    Progress Report Period: 8/5/24-9/5/24    Functional Assessment Used  Juan Ramon Roland Low Back Pain and Disability Score: 12.5       Objective Findings and Assessment:   Patient progression towards goals: Pt is s/p Hardware removal left TITS screw on 8/7/24. Per follow up with RODGER on 8/16/24, \"She feels the pain and tightness in her low back resolved immediately after getting her hardware removed.\" She is currently WBAT and returning to activities as tolerated per RODGER recommendations. Pt reporting decreased gym attendance and hep compliance as she is fearful to cause pain as she has been feeling good. \"Electric shocks\" pain have improved but continues to notice some low back discomfort-dull aching and improving. Has noticed some discomfort under the knee; notices this with increased sitting; a few times per week. 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 S/s overall improved since surgery and may benefit from cont'd PT to reintroduce strength and mobility challenges.     Objective findings and assessment details: Active Range of Motion      Lumbar   Flexion: within functional limits (FT to toes)  Extension: within functional limits  Left lateral flexion: within " functional limits  Right lateral flexion: within functional limits  Left rotation: within functional limits  Right rotation: within functional limits     Tests      Additional Tests Details  Pos for neural tension at BLE's via slump testing L>R at eval; neg today      Goals:   Short Term Goals:   1. Pt will be independent with written HEP. (Met)  2. Pt will demonstrate compliance to nerve glides at 2-3x/day. (Not Met)    Short term goal time span:  2-4 weeks      Long Term Goals:    1. Pt will be independent with written HEP. (Ongoing)  2. Pt a sig improvement in RMQ score (eval: 33.33). (Met)  3. Pt will be able to return to gym at least 3x/wk with strengthening routine with min modifications if any to improve QOL. (Ongoing)  4. Pt will have 80% or greater improvement in peripheral s/s for indep pain management. (Ongoing)     Long term goal time span:  6-8 weeks    Plan:   Planned therapy interventions:  Neuromuscular Re-education (CPT 91777) and Therapeutic Exercise (CPT 01203)  Frequency: 1-2x/wk.  Duration in visits:  6  Plan details:  UPOC: 10/11/24    3x97110 and 1x97112 per visit       Referring provider co-signature:  I have reviewed this plan of care and my co-signature certifies the need for services.     Certification Period: 09/05/2024 to 10/11/24    Physician Signature: ________________________________ Date: ______________

## 2024-09-10 ENCOUNTER — APPOINTMENT (OUTPATIENT)
Dept: PHYSICAL THERAPY | Facility: MEDICAL CENTER | Age: 39
End: 2024-09-10
Attending: INTERNAL MEDICINE
Payer: MEDICAID

## 2024-09-11 ENCOUNTER — PHYSICAL THERAPY (OUTPATIENT)
Dept: PHYSICAL THERAPY | Facility: MEDICAL CENTER | Age: 39
End: 2024-09-11
Attending: INTERNAL MEDICINE
Payer: MEDICAID

## 2024-09-11 DIAGNOSIS — S32.89XS: ICD-10-CM

## 2024-09-11 DIAGNOSIS — M25.561 RIGHT KNEE PAIN, UNSPECIFIED CHRONICITY: ICD-10-CM

## 2024-09-11 PROCEDURE — 97110 THERAPEUTIC EXERCISES: CPT

## 2024-09-11 NOTE — OP THERAPY DAILY TREATMENT
"  Outpatient Physical Therapy  DAILY TREATMENT     [facility]patient Physical Therapy  [address]  [place] [address]  Phone:  [phone]  Fax:  [phone]    Date: 09/11/2024    Patient: Vanessa Crenshaw  YOB: 1985  MRN: [id number]     Time Calculation    Start time: 1546  Stop time: 1632 Time Calculation (min): 46 minutes         Chief Complaint: Back Problem    Visit #:2    SUBJECTIVE:  Pt reporting everything felt good until she \"overdid it.\" Stating she walked 2.5 miles last night and up on the grass and felt fatigued. Has been completing the exercises and has been feeling decreased stiffness and more range. Stating this 2.5 mile walk was the longest walk she has done since last December.     OBJECTIVE:  Current objective measures:     See PN     Therapeutic Exercises (CPT 76305):     1. upright bike, x4 min L1, cardiovascular warm up    2. new hep as below    3. pt education, re: increase frequency of nerve glides, reviewed    4. review of hep and goals    5. PPU's->PPUs with exhale, x10, good tolerance; progressed on hep    6. SL t/s rotations, x10, verbal review    7. 1/2 kneeling nerve glides knee flexed and then extended R, x10, reviewed; good carryover, faded cues to incr ankle DF      Time-based treatments/modalities:    Physical Therapy Timed Treatment Charges  Therapeutic exercise minutes (CPT 07197): 46 minutes      Pain rating (1-10) before treatment:  0/10 back, 4/10 slight infrapatellar discomfort  Pain rating (1-10) after treatment:  0/10    ASSESSMENT:   Response to treatment:   Pt reporting mild discomfort at inferior R knee following 2.5 mile walk; suspect this is due to significant increase in activity with decreased pacing; pt agreeable. Discussed pacing techniques and maintaining walking journal as record to avoid overuse/overexertion. Able to decrease pt's s/s following 1/2 kneeling n. Glides and additionally with PPU's, " progressed with self OP using exhale.      PLAN/RECOMMENDATIONS:   Plan for treatment: therapy treatment to continue next visit.  Planned interventions for next visit: continue with current treatment.  3x97110 and 1x97112 per visit

## 2024-09-17 ENCOUNTER — APPOINTMENT (OUTPATIENT)
Dept: PHYSICAL THERAPY | Facility: MEDICAL CENTER | Age: 39
End: 2024-09-17
Attending: INTERNAL MEDICINE
Payer: MEDICAID

## 2024-09-19 ENCOUNTER — APPOINTMENT (OUTPATIENT)
Dept: PHYSICAL THERAPY | Facility: MEDICAL CENTER | Age: 39
End: 2024-09-19
Attending: INTERNAL MEDICINE
Payer: MEDICAID

## 2024-11-19 ENCOUNTER — APPOINTMENT (OUTPATIENT)
Dept: PHYSICAL THERAPY | Facility: MEDICAL CENTER | Age: 39
End: 2024-11-19
Attending: INTERNAL MEDICINE
Payer: MEDICAID

## 2024-12-10 ENCOUNTER — APPOINTMENT (OUTPATIENT)
Dept: PHYSICAL THERAPY | Facility: MEDICAL CENTER | Age: 39
End: 2024-12-10
Attending: INTERNAL MEDICINE
Payer: MEDICAID

## 2025-02-11 ENCOUNTER — HOSPITAL ENCOUNTER (OUTPATIENT)
Dept: RADIOLOGY | Facility: MEDICAL CENTER | Age: 40
End: 2025-02-11
Payer: MEDICAID

## 2025-02-14 ENCOUNTER — PHARMACY VISIT (OUTPATIENT)
Dept: PHARMACY | Facility: MEDICAL CENTER | Age: 40
End: 2025-02-14
Payer: COMMERCIAL

## 2025-02-14 PROCEDURE — RXMED WILLOW AMBULATORY MEDICATION CHARGE: Performed by: SPECIALIST

## 2025-02-14 RX ORDER — OXYCODONE AND ACETAMINOPHEN 7.5; 325 MG/1; MG/1
TABLET ORAL
Qty: 65 TABLET | Refills: 0 | OUTPATIENT
Start: 2025-02-14

## 2025-02-21 ENCOUNTER — HOSPITAL ENCOUNTER (OUTPATIENT)
Dept: RADIOLOGY | Facility: MEDICAL CENTER | Age: 40
End: 2025-02-21
Attending: OBSTETRICS & GYNECOLOGY
Payer: MEDICAID

## 2025-02-21 DIAGNOSIS — Z12.31 ENCOUNTER FOR MAMMOGRAM TO ESTABLISH BASELINE MAMMOGRAM: ICD-10-CM

## 2025-04-02 ENCOUNTER — APPOINTMENT (OUTPATIENT)
Dept: PHYSICAL THERAPY | Facility: MEDICAL CENTER | Age: 40
End: 2025-04-02
Payer: MEDICAID

## 2025-04-14 ENCOUNTER — PHYSICAL THERAPY (OUTPATIENT)
Dept: PHYSICAL THERAPY | Facility: MEDICAL CENTER | Age: 40
End: 2025-04-14
Attending: INTERNAL MEDICINE
Payer: MEDICAID

## 2025-04-14 ENCOUNTER — TELEPHONE (OUTPATIENT)
Dept: PHYSICAL THERAPY | Facility: MEDICAL CENTER | Age: 40
End: 2025-04-14
Payer: MEDICAID

## 2025-04-14 DIAGNOSIS — S32.89XS: ICD-10-CM

## 2025-04-14 PROCEDURE — 97162 PT EVAL MOD COMPLEX 30 MIN: CPT

## 2025-04-14 ASSESSMENT — ENCOUNTER SYMPTOMS
PAIN SCALE: 0
PAIN SCALE AT HIGHEST: 4
PAIN SCALE AT LOWEST: 0

## 2025-04-14 NOTE — OP THERAPY EVALUATION
"  Outpatient Physical Therapy  INITIAL EVALUATION    [facility] Outpatient Physical Therapy  [address]  [facility] [address]  Phone:  [phone]  Fax:  [phone]    Date of Evaluation: 04/14/2025    Patient: Vanessa Crenshaw  YOB: 1985  MRN: [id number]     Referring Provider: Vanessa Carlson M.D.  [address] [place] [address]   Referring Diagnosis Dorsalgia, unspecified [M54.9]     Time Calculation  Start time: 0730  Stop time: 0808 Time Calculation (min): 38 minutes         Chief Complaint: No chief complaint on file.    Visit Diagnoses     ICD-10-CM   1. Fracture of other parts of pelvis, sequela  S32.89XS       Date of onset of impairment: Multiple active episodes found    Subjective:   History of Present Illness:     Mechanism of injury:  Patient is a 39 year old female with a PMH including: Rib fractures and pulmonary contusion, traumatic cephalohematoma, t/s fracture, asthma, anxiety, dilation and curettage (2021). Pt reporting has had chronic R knee issues from 2015 with soreness in the knee. No clear CT in the past. She was seen in PT in the past and is known to this provider.     Pt prev seen to address pelvic ring fracture due to tree falling on patient in [place]. Was seen in PT for rehab March-May 2024 with improvements. Pt prev contemplating hardware removal due to cont'd discomfort and stiffness at the pelvis. Per review of PCP notes, MD reporting \"41 female status post transsacral screw for pelvic injury December of last year.\"     Pt stating she was doing well last time seen in PT but noticed a lot of issues with prolonged sitting and being in class; she has recently withdrew.   Pt has since had this hardware and felt improvement. Pt stating she is currently on medications to manage pain and stiffness. Pain is now no longer in the pelvis and is rather in the mid back R>L; occurred in Dec or in R shoulder with lifting. " "She lifted her niece recently and felt a lot of discomfort in this region as well as at the shoulder. Pain begins with being \"tense\" but pain starts about a day later. No numbness/tingling; no pain radiating in the legs; no longer experiencing knee pain. Pt stating she is fearful to return to the gym as she had a lot of discomfort at her last visit.     Sleep disturbance:  Interrupted sleep  Pain:     Current pain ratin    At best pain ratin    At worst pain ratin (without medications)    Location:  R>L mid back; R shoulder blade    Quality: aching; stiffness, throbbing.    Progression:  Improving    Pain Comments::  Aggravating/difficult ADL's: prolonged standing>30 min, prolonged sitting>10 min or less, sitting on an inclined chair, lifting    Relieving: pain medications   Social Support:     Lives in:  Multiple-level home (stairs)  Diagnostic Tests:     Diagnostic Tests Comments:  Had imaging of her back completed at [facility]. Not available to provider. Pt reporting there was an abnormality but uncertain; she will bring this next time.     l/s MRI 24:  At the T12-L1 level, there is no disc height loss, disc herniation, canal stenosis, or foraminal narrowing.     At the L1-L2 level, there is no disc height loss, disc herniation, canal stenosis, or foraminal narrowing.     At the L2-L3 level, there is no disc loss, disc herniation, canal stenosis, or foraminal narrowing.     At the L3-L4 level, there is mild disc height loss and loss of disc T2 hyperintensity. There is a small central annular fissure and disc protrusion. This is similar to the prior exam with mild canal narrowing and mild bilateral foraminal narrowing.     At the L4-L5 level, there is disc height loss with mild loss of T2 hyperintensity. There is a diffuse disc bulge with a small central annular fissure. There is mild canal stenosis and moderate bilateral foraminal narrowing.     At the L5-S1 level, there is disc height " loss and loss of T2 hyperintensity. There is a central disc protrusion extending to the left. There is mild canal stenosis and mild-to-moderate bilateral foraminal narrowing.     The visualized intra-abdominal structures are unremarkable.     IMPRESSION:        1. Multilevel degenerative disease as detailed above.  2. Small central annular fissures are noted at L3-4 and L4-5.  3. There is no significant canal stenosis.  4. Mild to moderate bilateral foraminal narrowing as detailed above.      Treatments:     Treatment Comments:  Surgery -pelvic hardware  Pain medications  Blood thinners for DVT  Knee cortisone shot   Activities of Daily Living:     Patient reported ADL status: Patient's current daily routine includes:  Work: Assistance with a food truck (part-time)   Hobbies: going to gym - no longer   Exercise: Walking outdoors with dog>1 mile with medication; no prev PT exercises       Patient Goals:     Patient goals for therapy:  [place] with ADLs/IADLs and decreased pain    Other patient goals:  Returning to gym, be more active, bending over to play with niece, lifting up objects to help with food truck      Past Medical History:   Diagnosis Date    Asthma     Blood clotting disorder ([facility])     H/O DVT    Bulging lumbar disc 08/06/2024    Dental disorder     upper denture    DVT (deep venous thrombosis) ([facility]) 01/2024    Left Leg    Injury     Pain 08/06/2024    Pain since  surgery per patient.    Psychiatric problem     anxiety    Retained metal fragments     Under care of pain management specialist 08/06/2024    [facility]     Past Surgical History:   Procedure Laterality Date    HARDWARE REMOVAL ORTHO  8/7/2024    Procedure: HARWARE REMOVAL LEFT SACROILIAC PELVIS SCREWS;  Surgeon: Ernst Sarmiento M.D.;  Location: SURGERY [facility];  Service: Orthopedics    PB PERCUT FIX PBOX/NECK FEMUR FX N/A 12/28/2023    Procedure: FIXATION, HIP, USING CANNULATED SCREW - SACROILIAC  SCREW PELVIS;  Surgeon: Ernst Sarmiento M.D.;  Location: SURGERY [facility];  Service: Orthopedics    HYSTEROSCOPY WITH MYOSURE  04/21/2021    Procedure: HYSTEROSCOPY;  Surgeon: Perla Rehman M.D.;  Location: SURGERY SAME DAY [facility];  Service: Gynecology    DILATION AND CURETTAGE  04/21/2021    Procedure: DILATION AND CURETTAGE.;  Surgeon: Perla Rehman M.D.;  Location: SURGERY SAME DAY [facility];  Service: Gynecology    ABDOMINAL EXPLORATION      OPEN REDUCTION      ORIF, HIP       Social History     Tobacco Use    Smoking status: Every Day     Current packs/day: 1.00     Average packs/day: 1 pack/day for 24.5 years (24.5 ttl pk-yrs)     Types: Cigarettes     Start date: 10/5/2000    Smokeless tobacco: Never   Substance Use Topics    Alcohol use: Yes     Alcohol/week: 0.0 - 3.6 oz     Family and Occupational History     Socioeconomic History    Marital status: Single     Spouse name: Not on file    Number of children: Not on file    Years of education: Not on file    Highest education level: Not on file   Occupational History    Not on file       Objective     Postural Observations  Seated posture: fair    Additional Postural Observation Details  Forward head and rounded shoulders     Hip Screen   Hip range of motion within functional limits with the following exceptions: HS length WFL   IR limited on R  ER WFL   (PROM tested in supine 90/90)     Palpation     Additional Palpation Details  TTP t/s and l/s paraspinals R>L    Active Range of Motion     Lumbar   Flexion: within functional limits (FT to toes)  Extension: within functional limits (stiffness R>L)  Left lateral flexion: within functional limits  Right lateral flexion: within functional limits  Left rotation: within functional limits  Right rotation: within functional limits    Additional Active Range of Motion Details  Most stiffness with extension    Joint Play   Spine     Central PA [place]        T10: hypomobile       T11: hypomobile        T12: hypomobile       L1: hypomobile       L2: hypomobile       L3: hypomobile       L4: hypomobile       L5: hypomobile       S1: hypomobile          Therapeutic Exercises (CPT 79453):     1. pt education, re: PT exam findings and upcoming POC    2. new hep as below    20. *no charge for exercise      Therapeutic Exercise Summary: Access Code: [email or web address]  URL: [email or web address]  Date: 04/14/2025  Prepared by: Kelvin Isaac    Exercises  - Open Book Chest Stretch on Towel Roll  - 2 x daily - 7 x weekly - 1 sets - 10 reps  - Sidelying Thoracic Lumbar Rotation  - 2 x daily - 7 x weekly - 1 sets - 10 reps  - Prone Press Up  - 2 x daily - 7 x weekly - 1 sets - 10 reps    Pt performed these exercises with instruction and SPV.  Provided handout with these exercises for daily HEP.          Time-based treatments/modalities:           Assessment, Response and Plan:   Impairments: abnormal gait, abnormal or restricted ROM, activity intolerance, hypersensitivity, impaired physical strength and lacks appropriate home exercise program    Assessment details:  Patient is a pleasant and cooperative 38 year old female who is known to this provider. Seen in the past for rehab March-May 2024 following hardware placement for pelvic ring fracture due to crush injury in Dec 2023. She has since had this hardware removed and was doing well but then started experiencing increased pain with prolonged sitting during her classes. Pain has shifted from pelvis to mid back region. She is scheduled for additional imaging and recently completed MRI at [facility] -will bring results into upcoming visit. No red flags reported. Exam findings suggestive of TTP at t/s and l/s paraspinals, hypomobility at t/s>l/s, and mild hypomobility at R hip into IR. Pt may benefit from skilled physical therapy in order to address above impairments in order to improve QOL and return to reported ADL's.     Other barriers to therapy:  May require  injection or hardware removal in addition to PT services  Prognosis comment:  Good/fair  Goals:   Short Term Goals:   1. Pt will be independent with written HEP.  2. Pt will start walking program.  3. Pt will be able to return to gym with mild to no modifications at least 1x/wk.      Short term goal time span:  2-4 weeks      Long Term Goals:    1. Pt will be independent with written HEP.  2. Pt a sig improvement in Oswestry (eval: 44)  3. Pt will be able to return to gym at least 3x/wk with strengthening routine with min modifications if any to improve QOL.  4. Pt will be able to sit for at least 2 hours with min to no modifications in order to participate in family meals, watch a movie, and complete other ADL's.      Long term goal time span:  2-4 months    Plan:   Therapy options:  Physical therapy treatment to continue  Planned therapy interventions:  Neuromuscular Re-education (CPT 54952) and Therapeutic Exercise (CPT 72189)  Frequency: 1-2x/wk.  Duration in visits:  12  Discussed with:  Patient  Plan details:  UPOC: 6/13/25    3x97110 and 1x97112 per visit     *POC extended to allot for lag in auth time for insurance      Functional Assessment Used  Oswestry Low Back Pain Disability Total Score: 44     Referring provider co-signature:  I have reviewed this plan of care and my co-signature certifies the need for services.    Certification Period: 04/14/2025 to  6/13/25    Physician Signature: ________________________________ Date: ______________

## 2025-04-14 NOTE — OP THERAPY DISCHARGE SUMMARY
Outpatient Physical Therapy  DISCHARGE SUMMARY NOTE      [facility] Outpatient Physical Therapy  [address]  [facility] [address]  Phone:  [phone]  Fax:  [phone]    Date of Visit: 04/14/2025    Patient: Vanessa Crenshaw  YOB: 1985  MRN: [id number]     Referring Provider: Vanessa Carlson M.D.  [address] [place] [address]   Referring Diagnosis Fracture of other parts of pelvis, sequela [S32.89XS]          Functional Assessment Used        Your patient is being discharged from Physical Therapy with the following comments:   Patient has failed to schedule or reschedule follow-up visits    Comments:  Pt last seen on 9/11/24; she is presenting to PT for new encounter on 4/14.     Limitations Remaining:  Please see last DOS    Recommendations:  Pt has not been seen in clinic >30 days. Pt has failed to schedule additional follow ups. Per policy, pt will need to be seen by PCP or acquire another referral prior to initiating skilled physical therapy. Pt is being discharged at this time. Pt will be seen for new eval on 4/14.      Kelvin Isaac, PT    Date: 4/14/2025

## 2025-04-21 ENCOUNTER — HOSPITAL ENCOUNTER (OUTPATIENT)
Dept: RADIOLOGY | Facility: MEDICAL CENTER | Age: 40
End: 2025-04-21
Attending: NURSE PRACTITIONER
Payer: MEDICAID

## 2025-04-21 ENCOUNTER — PHYSICAL THERAPY (OUTPATIENT)
Dept: PHYSICAL THERAPY | Facility: MEDICAL CENTER | Age: 40
End: 2025-04-21
Attending: INTERNAL MEDICINE
Payer: MEDICAID

## 2025-04-21 DIAGNOSIS — M53.3 DISORDER OF SACRUM: ICD-10-CM

## 2025-04-21 DIAGNOSIS — M54.2 CERVICALGIA: ICD-10-CM

## 2025-04-21 DIAGNOSIS — S32.89XS: ICD-10-CM

## 2025-04-21 PROCEDURE — 72141 MRI NECK SPINE W/O DYE: CPT

## 2025-04-21 PROCEDURE — 72195 MRI PELVIS W/O DYE: CPT

## 2025-04-21 PROCEDURE — 97110 THERAPEUTIC EXERCISES: CPT

## 2025-04-21 NOTE — OP THERAPY DAILY TREATMENT
Outpatient Physical Therapy  DAILY TREATMENT     [facility] Outpatient Physical Therapy  [address]  [place] [address]  Phone:  [phone]  Fax:  [phone]    Date: 04/21/2025    Patient: Vanessa Crenshaw  YOB: 1985  MRN: [id number]     Time Calculation    Start time: 1031  Stop time: 1116 Time Calculation (min): 45 minutes         Chief Complaint: Back Problem    Visit #: 5    SUBJECTIVE:  Pt stating       OBJECTIVE:  Current objective measures:     Tenderness  TTP t/s paraspinals L    Active Range of Motion   Lumbar   Flexion: within functional limits (FT to toes)  Extension: within functional limits (stiffness R>L); discomfort l/s and pelvis  Left lateral flexion: within functional limits  Right lateral flexion: within functional limits  Left rotation: within functional limits  Right rotation: within functional limits    Manual l/s traction: stretching only without adverse effects      Therapeutic Exercises (CPT 67917):     1. FR sequence (open book, alt GH flexion, SA punches, rib mobs, t/s extensions)    2. new hep as below    3. open book chest stretch->standing, x10, hep    4. s/l thoracic rotations, x10 ea, reviewed; hypomobile B R>L; improved with incr reps    5. prone press ups, x10, reviewed    6. double lacrosse balls at wall, x3 min, HO provided    7. PPU's with exhale for self OP, x10, reviewed    8. PPU's with t/s emphasis, x10, reviewed    20. *no charge for exercise      Therapeutic Exercise Summary: Access Code: [email or web address]  URL: [email or web address]  Date: 04/14/2025  Prepared by: Kelvin Isaac          Time-based treatments/modalities:    Physical Therapy Timed Treatment Charges  Therapeutic exercise minutes (CPT 31189): 45 minutes      Pain rating (1-10) before treatment:    Pain rating (1-10) after treatment:    mid back R>L    Pain Comments::  Aggravating/difficult ADL's: prolonged standing>30 min, prolonged  sitting>10 min or less, sitting on an inclined chair, lifting     ASSESSMENT:   Response to treatment:   Demonstrating good carryover to hep; did modify open books to standing pec stretches due to minimal stretching experienced with hep. Cont'd hypomobility observed at B pecs and l/s today. May trial l/s mech traction in follow ups at conservative parameters; stretching only without adverse effects. Will explore soft tissue limitations in follow ups.     PLAN/RECOMMENDATIONS:   Plan for treatment: therapy treatment to continue next visit.  Planned interventions for next visit: continue with current treatment.  [id number] and [id number] per visit

## 2025-04-24 ENCOUNTER — APPOINTMENT (OUTPATIENT)
Dept: PHYSICAL THERAPY | Facility: MEDICAL CENTER | Age: 40
End: 2025-04-24
Attending: INTERNAL MEDICINE
Payer: MEDICAID

## 2025-04-29 ENCOUNTER — PHYSICAL THERAPY (OUTPATIENT)
Dept: PHYSICAL THERAPY | Facility: MEDICAL CENTER | Age: 40
End: 2025-04-29
Attending: INTERNAL MEDICINE
Payer: MEDICAID

## 2025-04-29 DIAGNOSIS — S32.89XS: ICD-10-CM

## 2025-04-29 PROCEDURE — 97110 THERAPEUTIC EXERCISES: CPT

## 2025-04-29 PROCEDURE — 97112 NEUROMUSCULAR REEDUCATION: CPT

## 2025-04-29 PROCEDURE — 97140 MANUAL THERAPY 1/> REGIONS: CPT

## 2025-04-29 NOTE — OP THERAPY DAILY TREATMENT
Outpatient Physical Therapy  DAILY TREATMENT     [facility] Outpatient Physical Therapy  [address]  [place] [address]  Phone:  [phone]  Fax:  [phone]    Date: 04/29/2025    Patient: Vanessa Crenshaw  YOB: 1985  MRN: [id number]     Time Calculation    Start time: 0730  Stop time: 0812 Time Calculation (min): 42 minutes         Chief Complaint: Back Problem    Visit #: 3    SUBJECTIVE:  Pt stating that her lower back is fine. Is in pain today. Was completing a lot of cleaning and carrying a lot of laundry, bending over to clean. Had no pain yesterday but is feeling consequences today.       OBJECTIVE:  Current objective measures:     Neck AROM:  Flexion: stiff, increase  Extension: stiff, no change   SB R: stiff, no change   SB L: stiff, decrease   Rot R: 49 deg, decrease  Rot L: 31 deg; increase    Special tests:  Pos R Spurling's; neg L   Distraction: + (decrease mid back pain)  Tension at median nerve LUE    Axial load sensitivity        Therapeutic Exercises (CPT 72954):     1. FR sequence (open book, alt GH flexion, SA punches, rib mobs, t/s extensions)    2. UBE, x5 min, cardiovascular warm up    3. open book chest stretch->standing, x10, NT    4. s/l thoracic rotations, x10 ea, NT    5. prone press ups, x10, NT    6. double lacrosse balls at wall, x3 min, NT    7. PPU's with exhale for self OP, x10, NT    8. PPU's with t/s emphasis, x10, NT    9. seated t/s rotations, x10 ea, hep    10. pt education, re: clowards signs with visual aides    11. self SNAG extensions, x10 with pillow case, hep    20. *no charge for exercise      Therapeutic Exercise Summary: Access Code: [email or web address]  URL: [email or web address]  Date: 04/14/2025  Prepared by: Kelvin Isaac        Therapeutic Treatments and Modalities:     1. Neuromuscular Re-education (CPT 80354), see below, x12 min    Therapeutic Treatment and Modalities Summary: Neuro  re-ed:  CPA and rotational mobs to C7-T10 gr II and III //decr tolerance to CPA with incr tolerance to rotational mobs with incr reps    Gentle STM to c/s paraspinals and suboccipitals and c/s traction using pillowcase (unable to tolerate manually at suboccipitals)    Time-based treatments/modalities:    Physical Therapy Timed Treatment Charges  Manual therapy minutes (CPT 58821): 12 minutes  Therapeutic exercise minutes (CPT 03148): 30 minutes      Pain rating (1-10) before treatment:  4/10 burning b/w shoulder blades  Pain rating (1-10) after treatment:  0/10     Pain Comments::  Aggravating/difficult ADL's: prolonged standing>30 min, prolonged sitting>10 min or less, sitting on an inclined chair, lifting     ASSESSMENT:   Response to treatment:   Pt demonstrating s/s consistent with cerviogenic origin with pos Cloward sign. Slight change in management today to assist with this management. Able to abolish s/s by end of visit. Will return to prev POC next session depending on impairments.      PLAN/RECOMMENDATIONS:   Plan for treatment: therapy treatment to continue next visit.  Planned interventions for next visit: continue with current treatment.  [id number] and 1x97112 per visit

## 2025-05-01 ENCOUNTER — APPOINTMENT (OUTPATIENT)
Dept: PHYSICAL THERAPY | Facility: MEDICAL CENTER | Age: 40
End: 2025-05-01
Attending: INTERNAL MEDICINE
Payer: MEDICAID

## 2025-05-05 ENCOUNTER — APPOINTMENT (OUTPATIENT)
Dept: PHYSICAL THERAPY | Facility: MEDICAL CENTER | Age: 40
End: 2025-05-05
Attending: INTERNAL MEDICINE
Payer: MEDICAID

## 2025-05-06 ENCOUNTER — PHYSICAL THERAPY (OUTPATIENT)
Dept: PHYSICAL THERAPY | Facility: MEDICAL CENTER | Age: 40
End: 2025-05-06
Attending: INTERNAL MEDICINE
Payer: MEDICAID

## 2025-05-06 DIAGNOSIS — M54.50 LOW BACK PAIN, UNSPECIFIED BACK PAIN LATERALITY, UNSPECIFIED CHRONICITY, UNSPECIFIED WHETHER SCIATICA PRESENT: ICD-10-CM

## 2025-05-06 PROCEDURE — 97110 THERAPEUTIC EXERCISES: CPT

## 2025-05-06 PROCEDURE — 97112 NEUROMUSCULAR REEDUCATION: CPT

## 2025-05-06 NOTE — OP THERAPY DAILY TREATMENT
Outpatient Physical Therapy  DAILY TREATMENT     [facility] Outpatient Physical Therapy  [address]  [place] [address]  Phone:  [phone]  Fax:  [phone]    Date: 05/06/2025    Patient: Vanessa Crenshaw  YOB: 1985  MRN: [id number]     Time Calculation    Start time: 0731  Stop time: 0812 Time Calculation (min): 41 minutes         Chief Complaint: Back Problem    Visit #: 4    SUBJECTIVE:  Pt stating s/s resolved when she left. Stating she feels improved until the hiking may bring on her s/s (about 1/4 mile). Today just feels stiff. Has noticed some improvements in the low back. Feels some weakness; cannot recall what activities provoked this. Stating she hasn't attempted the cervical extensions with pillow case.      OBJECTIVE:  Current objective measures:     Neck AROM:  Flexion: stiff, End-range tightness  Extension: WFL; stiffness  SB R: stiff, 27 deg   SB L: stiff, 35 deg  Rot R: 55 deg, decrease  Rot L: 41 deg; increase        Therapeutic Exercises (CPT 87783):     2. nustepper, x5 min L3, cardiovascular warm up    3. open book chest stretch->standing, x10, NT    4. s/l thoracic rotations, x10 ea, verbal review    5. prone press ups, x10, NT    6. double lacrosse balls at wall, x3 min, NT    7. PPU's with exhale for self OP, x10, verbal review    8. PPU's with t/s emphasis, x10, NT    9. seated t/s rotations, x10 ea, verbal review    11. self SNAG extensions, x10 with pillow case, reviewed; VC's for set up    20. *no charge for exercise      Therapeutic Exercise Summary: Access Code: [email or web address]  URL: [email or web address]  Date: 04/14/2025  Prepared by: Kelvin Isaac        Therapeutic Treatments and Modalities:     1. Neuromuscular Re-education (CPT 82195), see below, x9 min    Therapeutic Treatment and Modalities Summary: Neuro re-ed:  CPA and rotational mobs to C7-T10 gr II and III //decr tolerance to CPA with incr tolerance  to rotational mobs with incr reps      Time-based treatments/modalities:    Physical Therapy Timed Treatment Charges  Neuromusc re-ed, balance, coor, post minutes (CPT 52312): 9 minutes  Therapeutic exercise minutes (CPT 27497): 32 minutes      Pain rating (1-10) before treatment: stiffness in mid and low back   Pain rating (1-10) after treatment:  0/10     Pain Comments::  Aggravating/difficult ADL's: prolonged standing>30 min, prolonged sitting>10 min or less, sitting on an inclined chair, lifting     ASSESSMENT:   Response to treatment:   Pt demonstrating improved cervical rotation compared to prev session and remarking on decreased stiffness overall. Review of hep today with encouragement for increased consistency to all exercises. Will progress exercises in next session.    Pt politely declining traction today, stating she has attempted before without sig relief.     PLAN/RECOMMENDATIONS:   Plan for treatment: therapy treatment to continue next visit.  Planned interventions for next visit: continue with current treatment.  3x97110 and 1x97112 per visit

## 2025-05-08 ENCOUNTER — APPOINTMENT (OUTPATIENT)
Dept: PHYSICAL THERAPY | Facility: MEDICAL CENTER | Age: 40
End: 2025-05-08
Attending: INTERNAL MEDICINE
Payer: MEDICAID

## 2025-05-12 ENCOUNTER — APPOINTMENT (OUTPATIENT)
Dept: PHYSICAL THERAPY | Facility: MEDICAL CENTER | Age: 40
End: 2025-05-12
Attending: INTERNAL MEDICINE
Payer: MEDICAID

## 2025-05-13 ENCOUNTER — APPOINTMENT (OUTPATIENT)
Dept: PHYSICAL THERAPY | Facility: MEDICAL CENTER | Age: 40
End: 2025-05-13
Attending: INTERNAL MEDICINE
Payer: MEDICAID

## 2025-05-15 ENCOUNTER — APPOINTMENT (OUTPATIENT)
Dept: PHYSICAL THERAPY | Facility: MEDICAL CENTER | Age: 40
End: 2025-05-15
Attending: INTERNAL MEDICINE
Payer: MEDICAID

## 2025-05-19 ENCOUNTER — APPOINTMENT (OUTPATIENT)
Dept: PHYSICAL THERAPY | Facility: MEDICAL CENTER | Age: 40
End: 2025-05-19
Attending: INTERNAL MEDICINE
Payer: MEDICAID

## (undated) DEVICE — LACTATED RINGERS INJ 1000 ML - (14EA/CA 60CA/PF)

## (undated) DEVICE — BANDAGE ELASTIC STERILE VELCRO 6 X 5 YDS (25EA/CA)

## (undated) DEVICE — TUBING CLEARLINK DUO-VENT - C-FLO (48EA/CA)

## (undated) DEVICE — SUCTION INSTRUMENT YANKAUER BULBOUS TIP W/O VENT (50EA/CA)

## (undated) DEVICE — Device

## (undated) DEVICE — DRAPE 36X28IN RAD CARM BND BG - (25/CA) O

## (undated) DEVICE — ELECTRODE DUAL RETURN W/ CORD - (50/PK)

## (undated) DEVICE — TRAY SRGPRP PVP IOD WT PRP - (20/CA)

## (undated) DEVICE — NEEDLE SPINAL NON-SAFETY 22 GA X 3 (25EA/BX)"

## (undated) DEVICE — SUTURE GENERAL

## (undated) DEVICE — SENSOR OXIMETER ADULT SPO2 RD SET (20EA/BX)

## (undated) DEVICE — CANISTER SUCTION 3000ML MECHANICAL FILTER AUTO SHUTOFF MEDI-VAC NONSTERILE LF DISP  (40EA/CA)

## (undated) DEVICE — SODIUM CHL. IRRIGATION 0.9% 3000ML (4EA/CA 65CA/PF)

## (undated) DEVICE — CATHETER IV 20 GA X 1-1/4 ---SURG.& SDS ONLY--- (50EA/BX)

## (undated) DEVICE — KIT ANESTHESIA W/CIRCUIT & 3/LT BAG W/FILTER (20EA/CA)

## (undated) DEVICE — BAG SPONGE COUNT 10.25 X 32 - BLUE (250/CA)

## (undated) DEVICE — GUIDEPIN 2.8MM X 450MM DRILL TIPPED (10EA/PK) (3TX3=9)

## (undated) DEVICE — SENSOR SPO2 NEO LNCS ADHESIVE (20/BX) SEE USER NOTES

## (undated) DEVICE — DRESSING TRANSPARENT FILM TEGADERM 4 X 4.75" (50EA/BX)"

## (undated) DEVICE — SLEEVE, VASO, THIGH, MED

## (undated) DEVICE — SUTURE 2-0 VICRYL PLUS CT-1 - 8 X 18 INCH(12/BX)

## (undated) DEVICE — TUBING OUTFLOW HYSTEROSCPY (10EA/BX)

## (undated) DEVICE — DRAPE LARGE 3 QUARTER - (20/CA)

## (undated) DEVICE — PACK MAJOR ORTHO - (2EA/CA)

## (undated) DEVICE — SET LEADWIRE 5 LEAD BEDSIDE DISPOSABLE ECG (1SET OF 5/EA)

## (undated) DEVICE — SUTURE 0 VICRYL PLUS CT-1 - 8 X 18 INCH (12/BX)

## (undated) DEVICE — GLOVE BIOGEL INDICATOR SZ 8 SURGICAL PF LTX - (50/BX 4BX/CA)

## (undated) DEVICE — PAD LAP STERILE 18 X 18 - (5/PK 40PK/CA)

## (undated) DEVICE — COVER LIGHT HANDLE ALC PLUS DISP (18EA/BX)

## (undated) DEVICE — MASK ANESTHESIA ADULT  - (100/CA)

## (undated) DEVICE — GOWN WARMING STANDARD FLEX - (30/CA)

## (undated) DEVICE — STAPLER SKIN DISP - (6/BX 10BX/CA) VISISTAT

## (undated) DEVICE — PROTECTOR ULNA NERVE - (36PR/CA)

## (undated) DEVICE — SET EXTENSION WITH 2 PORTS (48EA/CA) ***PART #2C8610 IS A SUBSTITUTE*****

## (undated) DEVICE — WATER IRRIGATION STERILE 1000ML (12EA/CA)

## (undated) DEVICE — CHLORAPREP 26 ML APPLICATOR - ORANGE TINT(25/CA)

## (undated) DEVICE — DRAPE STRLE REG TOWEL 18X24 - (10/BX 4BX/CA)"

## (undated) DEVICE — KIT  I.V. START (100EA/CA)

## (undated) DEVICE — TUBING INFLOW HYSTEROSCOPY (10EA/CA)

## (undated) DEVICE — SUTURE 3-0 ETHILON FS-1 - (36/BX) 30 INCH

## (undated) DEVICE — CHLORAPREP 3 ML APPLICATOR - (25/BX 4BX/CS 100/CS)

## (undated) DEVICE — DRAPE UNDER BUTTOCKS FLUID - (20/CA)

## (undated) DEVICE — HEAD HOLDER JUNIOR/ADULT

## (undated) DEVICE — NEPTUNE 4 PORT MANIFOLD - (20/PK)

## (undated) DEVICE — PADDING CAST 6 IN STERILE - 6 X 4 YDS (24/CA)

## (undated) DEVICE — GLOVE BIOGEL SZ 7.5 SURGICAL PF LTX - (50PR/BX 4BX/CA)

## (undated) DEVICE — DRAPE U ORTHOPEDIC - (10/BX)

## (undated) DEVICE — CANISTER SUCTION 3000ML MECHANICAL FILTER AUTO SHUTOFF MEDI-VAC NONSTERILE LF DISP (40EA/CA)

## (undated) DEVICE — CANISTER SUCTION RIGID RED 1500CC (40EA/CA)

## (undated) DEVICE — SODIUM CHL IRRIGATION 0.9% 1000ML (12EA/CA)

## (undated) DEVICE — ELECTRODE 850 FOAM ADHESIVE - HYDROGEL RADIOTRNSPRNT (50/PK)

## (undated) DEVICE — GLOVE BIOGEL SZ 6.5 SURGICAL PF LTX (50PR/BX 4BX/CA)

## (undated) DEVICE — GLOVE BIOGEL PI INDICATOR SZ 7.0 SURGICAL PF LF - (50/BX 4BX/CA)

## (undated) DEVICE — TUBE CONNECTING SUCTION - CLEAR PLASTIC STERILE 72 IN (50EA/CA)

## (undated) DEVICE — PAD SANITARY 11IN MAXI IND WRAPPED  (12EA/PK 24PK/CA)

## (undated) DEVICE — TOWEL STOP TIMEOUT SAFETY FLAG (40EA/CA)